# Patient Record
Sex: FEMALE | Race: WHITE | NOT HISPANIC OR LATINO | Employment: FULL TIME | URBAN - METROPOLITAN AREA
[De-identification: names, ages, dates, MRNs, and addresses within clinical notes are randomized per-mention and may not be internally consistent; named-entity substitution may affect disease eponyms.]

---

## 2017-01-11 ENCOUNTER — OFFICE VISIT (OUTPATIENT)
Dept: FAMILY MEDICINE | Facility: CLINIC | Age: 57
End: 2017-01-11
Attending: FAMILY MEDICINE
Payer: COMMERCIAL

## 2017-01-11 VITALS
SYSTOLIC BLOOD PRESSURE: 105 MMHG | DIASTOLIC BLOOD PRESSURE: 70 MMHG | HEART RATE: 93 BPM | WEIGHT: 168.4 LBS | BODY MASS INDEX: 23.57 KG/M2 | HEIGHT: 71 IN

## 2017-01-11 DIAGNOSIS — L21.9 SEBORRHEIC DERMATITIS: Primary | ICD-10-CM

## 2017-01-11 DIAGNOSIS — M77.8 TENDINITIS OF LEFT WRIST: ICD-10-CM

## 2017-01-11 PROCEDURE — 99212 OFFICE O/P EST SF 10 MIN: CPT | Mod: ZF

## 2017-01-11 RX ORDER — FLUOCINONIDE TOPICAL SOLUTION USP, 0.05% 0.5 MG/ML
SOLUTION TOPICAL
Qty: 60 ML | Refills: 0 | Status: SHIPPED
Start: 2017-01-11 | End: 2017-03-20

## 2017-01-11 ASSESSMENT — PAIN SCALES - GENERAL: PAINLEVEL: NO PAIN (0)

## 2017-01-11 NOTE — MR AVS SNAPSHOT
After Visit Summary   1/11/2017    Casie Harris    MRN: [id number]           Patient Information     Date Of Birth          1960        Visit Information        Provider Department      1/11/2017 10:00 AM Kings Millan MD [facility]        Today's Diagnoses     Seborrheic dermatitis    -  1     Tendinitis of left wrist            Follow-ups after your visit        Additional Services     [facility] PT, HAND, AND CHIROPRACTIC REFERRAL       **This order will print in the [facility] Scheduling Office**    Physical Therapy, Hand Therapy and Chiropractic Care are available through:    *[facility]  *[facility]  *[facility]    Call one number to schedule at any of the above locations: [phone].    Your provider has referred you to: Hand Therapy    Indication/Reason for Referral: Wrist Pain L  Onset of Illness: fall 2016  Therapy Orders: Evaluate and Treat  Special Programs: None  Special Request: Equipment: As Indicated: .  None    Lino Huynh      Additional Comments for the Therapist or Chiropractor: Pt active in cross country ski, bike and yoga. Would like to be able to do these activity    Please be aware that coverage of these services is subject to the terms and limitations of your health insurance plan.  Call member services at your health plan with any benefit or coverage questions.      Please bring the following to your appointment:    *Your personal calendar for scheduling future appointments  *Comfortable clothing                  Your next 10 appointments already scheduled     Mar 20, 2017  1:15 PM   MA SCREENING DIGITAL BILATERAL with [id number]   [facility] ([facility])    [address] [place] [address], [address]  [facility] [address] [phone]           Do not use any powder, lotion or deodorant under your arms or on your breast. If you do, we will ask you to remove it before your  "exam.  Wear comfortable, two-piece clothing.  If you have any allergies, tell your care team.  Bring any previous mammograms from other facilities or have them mailed to the breast center.            Mar 20, 2017  1:30 PM   Annual Visit with Sherice Jamil MD PHD   [facility] ([facility])    [address]  [address]  [address]  [facility] [address]  [facility] [address]   [phone]              Who to contact     Please call your clinic at [phone] to:    Ask questions about your health    Make or cancel appointments    Discuss your medicines    Learn about your test results    Speak to your doctor   If you have compliments or concerns about an experience at your clinic, or if you wish to file a complaint, please contact [facility] Patient Relations at [phone] or email us at [email or web address]         Additional Information About Your Visit        [employer] Information     [employer] gives you secure access to your electronic health record. If you see a primary care provider, you can also send messages to your care team and make appointments. If you have questions, please call your primary care clinic.  If you do not have a primary care provider, please call [phone] and they will assist you.      FanSnap is an electronic gateway that provides easy, online access to your medical records. With Fan[employer], you can request a clinic appointment, read your test results, renew a prescription or communicate with your care team.     To access your existing account, please contact your [facility] or call [phone] for assistance.        Care EveryWhere ID     This is your Care EveryWhere ID. This could be used by other organizations to access your [facility] medical records  [id number]        Your Vitals Were     Pulse Height BMI (Body Mass Index) Last Period          93 1.803 m (5' 11\") 23.50 kg/m2 " 03/25/2012         Blood Pressure from Last 3 Encounters:   01/11/17 105/70   11/11/16 117/79   03/07/16 110/72    Weight from Last 3 Encounters:   01/11/17 76.386 kg (168 lb 6.4 oz)   11/11/16 73.936 kg (163 lb)   03/07/16 76.023 kg (167 lb 9.6 oz)              We Performed the Following     [facility] PT, HAND, AND CHIROPRACTIC REFERRAL          Today's Medication Changes          These changes are accurate as of: 1/11/17 10:27 AM.  If you have any questions, ask your nurse or doctor.               These medicines have changed or have updated prescriptions.        Dose/Directions    * fluocinonide 0.05 % solution   Commonly known as:  LIDEX   This may have changed:  Another medication with the same name was added. Make sure you understand how and when to take each.   Used for:  Dermatitis, seborrheic   Changed by:  Barbara Manzanares MD        Apply topically 2 times daily To itchy and scaly area of scalp as needed until clear.   Quantity:  60 mL   Refills:  5       * fluocinonide 0.05 % solution   Commonly known as:  LIDEX   This may have changed:  You were already taking a medication with the same name, and this prescription was added. Make sure you understand how and when to take each.   Used for:  Seborrheic dermatitis   Changed by:  Kings Millan MD        Apply sparingly to affected area twice daily to scalp for 2 weeks   Quantity:  60 mL   Refills:  0       * Notice:  This list has 2 medication(s) that are the same as other medications prescribed for you. Read the directions carefully, and ask your doctor or other care provider to review them with you.         Where to get your medicines      These medications were sent to [id number] - [address] [address]     Phone:  [phone]    - fluocinonide 0.05 % solution             Primary Care Provider Office Phone # Fax #    Sherice Jamil MD [phone]  [phone]        [facility] [address] [facility] [address]  [facility] [address]        Thank you!     Thank you for choosing [facility]  for your care. Our goal is always to provide you with excellent care. Hearing back from our patients is one way we can continue to improve our services. Please take a few minutes to complete the written survey that you may receive in the mail after your visit with us. Thank you!             Your Updated Medication List - Protect others around you: Learn how to safely use, store and throw away your medicines at www.disposemymeds.org.          This list is accurate as of: 1/11/17 10:27 AM.  Always use your most recent med list.                   Brand Name Dispense Instructions for use    ADVIL 200 MG capsule   Generic drug:  ibuprofen      Take 200 mg by mouth every 4 hours as needed.       Calcium-Magnesium 250-125 MG Tabs      (Chewables) take 4 daily       clobetasol 0.05 % ointment    TEMOVATE    60 g    Apply topically 2 times daily To rash on feet  Along with ketoconazole until clear. May occlude with socks over medications at bedtime to help penetrate.       clonazePAM 0.5 MG tablet    klonoPIN    30 tablet    Take 1 tablet by mouth 2 times daily as needed.       FISH OIL PO      Take 2 daily       * fluocinonide 0.05 % solution    LIDEX    60 mL    Apply topically 2 times daily To itchy and scaly area of scalp as needed until clear.       * fluocinonide 0.05 % solution    LIDEX    60 mL    Apply sparingly to affected area twice daily to scalp for 2 weeks       ketoconazole 2 % shampoo    NIZORAL    120 mL    To entire wet scalp and then wash off after 5 minutes two times a week.       vitamin D 2000 UNITS Caps      Take 2 daily       * Notice:  This list has 2 medication(s) that are the same as other medications prescribed for you. Read the directions carefully, and ask your doctor or other care provider to review them with you.

## 2017-01-11 NOTE — Clinical Note
"1/11/2017       RE: Casie Harris  [address] [place] [address]     Dear Colleague,    Thank you for referring your patient, Casie Harris, to the [facility] at [facility]. Please see a copy of my visit note below.    HPI  1. L wrist pain--started late summer/early fall 2016 when biking a lot, with wrist pronated, doing repeated  Flexion/extension.  Pain is located around base of thumb, mainly at extension of wrist, with yoga postions, shoveling with wrist in extension. Sense of catching, and sharp pain and then goes away quickly. Over time, has developed tingling and numbness, tingling on thumb and thumb side mainly of hand, and then L arm feels overall weaker, but not numb. No neck pain or problems, symptoms are from wrist up. Weakness in arm constant for 2 weeks.   She has been using ice and heat, she has been exercising more; yoga and stretches. She is hoping to go skiing (cross country)    2. Rash scalp --occipital at neck line,  itching, flaking, not helped by nizoral shamppoo. This flare going on x 1-2 months, has had in past. She was seen by derm for this in 9/2015; note from that visit:  1. \"Seborrheic dermatitis: waxing/waning nature of this condition is discussed with patient and includes scale and redness of the scalp, eyebrows, ears and remainder of face. Will initiate treatment with ketoconazole shampoo and lidex solution\".  She is out of lidex currently.     ROS   No new neurological symtoms, continues periodic sense of L sided weakness, with negative neurologic evaluation in past  No new 12 point  ROS compared to prior visits.       Physical Exam   Blood pressure 105/70, pulse 93, height 1.803 m (5' 11\"), weight 76.386 kg (168 lb 6.4 oz), last menstrual period 03/25/2012, not currently breastfeeding.    L wrist: ROM non tender except maybe radial side wrist diffusely strength normal in arms bilaterally.  Scalp: " occiput at necklin: Red patch with flakes and lichenification     A/P  1. R wrist pain, possible tendonitis   Recommend refer to PT given patient's athletic interests. Recommend relative rest of wrist from yoga, other inciting activities.  2. Seborrheic dermatitis: Lidex solution bid x 2 week, continue nizoral 3x/wk ongoing  Education given regarding the chronic nature of seborrheic dermatitis.     F/u prn         Again, thank you for allowing me to participate in the care of your patient.      Sincerely,    Kings Millan MD

## 2017-01-11 NOTE — NURSING NOTE
"Chief Complaint   Patient presents with     Musculoskeletal Problem     Left wrist pain. Pt c/o \"nerve pain\" radiating from wrist up left arm.   "

## 2017-01-11 NOTE — PROGRESS NOTES
"HPI  1. L wrist pain--started late summer/early fall 2016 when biking a lot, with wrist pronated, doing repeated  Flexion/extension.  Pain is located around base of thumb, mainly at extension of wrist, with yoga postions, shoveling with wrist in extension. Sense of catching, and sharp pain and then goes away quickly. Over time, has developed tingling and numbness, tingling on thumb and thumb side mainly of hand, and then L arm feels overall weaker, but not numb. No neck pain or problems, symptoms are from wrist up. Weakness in arm constant for 2 weeks.   She has been using ice and heat, she has been exercising more; yoga and stretches. She is hoping to go skiing (cross country)    2. Rash scalp --occipital at neck line,  itching, flaking, not helped by nizoral shamppoo. This flare going on x 1-2 months, has had in past. She was seen by derm for this in 9/2015; note from that visit:  1. \"Seborrheic dermatitis: waxing/waning nature of this condition is discussed with patient and includes scale and redness of the scalp, eyebrows, ears and remainder of face. Will initiate treatment with ketoconazole shampoo and lidex solution\".  She is out of lidex currently.     ROS   No new neurological symtoms, continues periodic sense of L sided weakness, with negative neurologic evaluation in past  No new 12 point  ROS compared to prior visits.       Physical Exam   Blood pressure 105/70, pulse 93, height 1.803 m (5' 11\"), weight 76.386 kg (168 lb 6.4 oz), last menstrual period 03/25/2012, not currently breastfeeding.    L wrist: ROM non tender except maybe radial side wrist diffusely strength normal in arms bilaterally.  Scalp: occiput at necklin: Red patch with flakes and lichenification     A/P  1. R wrist pain, possible tendonitis   Recommend refer to PT given patient's athletic interests. Recommend relative rest of wrist from yoga, other inciting activities.  2. Seborrheic dermatitis: Lidex solution bid x 2 week, continue " nizoral 3x/wk ongoing  Education given regarding the chronic nature of seborrheic dermatitis.     F/u prn

## 2017-01-16 ENCOUNTER — THERAPY VISIT (OUTPATIENT)
Dept: OCCUPATIONAL THERAPY | Facility: CLINIC | Age: 57
End: 2017-01-16
Payer: COMMERCIAL

## 2017-01-16 DIAGNOSIS — M25.532 LEFT WRIST PAIN: Primary | ICD-10-CM

## 2017-01-16 PROCEDURE — 97140 MANUAL THERAPY 1/> REGIONS: CPT | Mod: GO | Performed by: OCCUPATIONAL THERAPIST

## 2017-01-16 PROCEDURE — 97165 OT EVAL LOW COMPLEX 30 MIN: CPT | Mod: GO | Performed by: OCCUPATIONAL THERAPIST

## 2017-01-16 PROCEDURE — 97112 NEUROMUSCULAR REEDUCATION: CPT | Mod: GO | Performed by: OCCUPATIONAL THERAPIST

## 2017-01-16 PROCEDURE — 97110 THERAPEUTIC EXERCISES: CPT | Mod: GO | Performed by: OCCUPATIONAL THERAPIST

## 2017-01-16 NOTE — PROGRESS NOTES
Hand Therapy Initial Evaluation    Current Date:  1/16/2017    Diagnosis: L wrist pain  DOI:  August '16  Procedure:  none    Precautions: NA    Subjective:  Casie Harris is a 56 year old right hand dominant female.    Patient reports symptoms of pain, weakness/loss of strength, numbness and tingling  of the left wrist which occurred due to not sure, but seemed to start noticing with bike riding last summer. Since onset symptoms are Unchanged  Special tests:  none.  Previous treatment: none.    General health as reported by patient is good.  Pertinent medical history includes:menopausal, numbness/tingling, pain at rest/night  Medical allergies:none.  Surgical history: none.  Medication history: steroids-local/rash.    Current occupation is writer  Currently working in normal job without restrictions  Job Tasks: prolonged sitting, repetitive tasks, computer work  Barriers include:none  Prior functional level:  no limitations    Additional Occupational Profile Information (patterns of daily living, interests, values and needs): cross country skiing, biking    Functional Outcome Measure:  Upper Extremity Functional Index Score:  SCORE:   Column Totals: /80: 72   (A lower score indicates greater disability.)    Objective:  Pain Level Report  VAS(0-10) 1/16/2017   At Rest: 0/10   With Use: 2/10     Report of Pain:  Location:  Wrist; ulnar and radial  Pain Quality:  Sharp  Frequency: intermittent    Pain is worst:  daytime  Exacerbated by:  Movement, use, biking, shoveling  Relieved by:  rest  Progression:  No change  Numbness/Tingling Level Report  VAS(0-10) 1/16/2017   At Rest: 4/10   At Worst 6/10     Report of Numbness/Tingling:  Location:  Dorsal hand  Pain Quality:  Numb and Tiring  Frequency: constant since December    Edema:  NONE  Sensation: Decreased Median Nerve distribution and Decreased Radial Nerve distribution    Pain Report:  - none    + mild    ++ moderate    +++ severe   Wrist  1/16/2017   AROM  (PROM) right left   Extension 74 80   Flexion 66 64   RD 20 22   UD 32 29+   Supination 90 88   Pronation 86 85+     Tenderness: Pain level report on scale 0-10/10  Date 1/16/2017   Side    Ulna Styloid -   TFCC -   Distal Radius -   Radial Styloid -   DRUJ -   Volar Scaphoid -   Dorsal Scaphoid -   Volar Lunate -   Dorsal Lunate -     Date 1/16/2017   Side    Radiocarpal P/S (TFCC--stab f/a, rotate with some compression) -   Ballottement II P/S (TFCC--stab hand/wrist, pt p/s) +   TFCC load Test (UD, axially load wrist c volar/dorsal mvmt) -   UMTDG test (TFCC--approximate the pisotriquetral and ulna) -   Lunotriquetral Sheer Test -   Piano Key Sign (DRUJ)      Resistance 1/16/2017   FCU +   Provocative Tests    Bent Elbow Test -   Phalen's -   Tinel's at Cubital tunnel -   Tinel's at Pronator -   Tinel's at carpal tunnel -   Tinel's at [place]on[place] canal -       Strength:   (Measured in pounds)  Pain Report:  - none    + mild    ++ moderate    +++ severe     1/16/2017   Trials right left   1  2  3 67  64  72 40  46  47   Average: 68 44     Lat Pinch  1/16/2017   Trials right left   1  2  3 17  18  18 16  16  16   Average: 18 16     3 Pt Pinch  1/16/2017   Trials right left   1  2  3 16  15  15 15  16  16   Average: 15 16     Assessment:  Patient presents with symptoms consistent with diagnosis of left wrist pain and parasthesias,  with conservative intervention.     Patient's limitations or Problem List includes:  Pain, Decreased ROM/motion, Weakness and Sensory disturbance of the left wrist, hand and forearm which interferes with the patient's ability to perform Self Care Tasks (dressing, eating, bathing, hygiene/toileting), Work Tasks, Sleep Patterns, Recreational Activities, Household Chores and Driving  as compared to previous level of function.    Rehab Potential:  Good - Return to full activity, some limitations    Patient will benefit from skilled Occupational Therapy to increase ROM, overall strength and  sensation and decrease pain to return to previous activity level and resume normal daily tasks and to reach their rehab potential.    Barriers to Learning:  No barrier    Communication Issues:  Patient appears to be able to clearly communicate and understand verbal and written communication and follow directions correctly.    Assessment of Occupational Performance:  5 or more Performance Deficits  Identified Performance Deficits: household chores , sports/recreation, pushing, pulling, lifting, carrying, sleeping  and home management      Clinical Decision Making (Complexity): Moderate complexity    Treatment Explanation:  The following has been discussed with the patient:  RX ordered/plan of care  Anticipated outcomes  Possible risks and side effects    Plan:  Frequency:  1 X week, once daily  Duration:  for 6 weeks    Treatment Plan:   Modalities:  US, Fluidotherapy, Paraffin and Laser Light  Therapeutic Exercise:  AROM, AAROM, PROM, Tendon Gliding and Blocking  Neuromuscular re-education:  Nerve Gliding and Kinesiotaping  Manual Techniques:  Myofascial release  Self Care:  Self Care Tasks  Discharge Plan:  Achieve all LTG.  Independent in home treatment program.  Reach maximal therapeutic benefit.    Home Exercise Program:  T-towel exercise  Massage  Sitting on exercise ball  Purchase OTC wrist splint for sleeping  Median Nerve glide    Next Visit:  A/AA/PROM  MFR  Nerve gliding

## 2017-01-30 ENCOUNTER — THERAPY VISIT (OUTPATIENT)
Dept: OCCUPATIONAL THERAPY | Facility: CLINIC | Age: 57
End: 2017-01-30
Payer: COMMERCIAL

## 2017-01-30 DIAGNOSIS — M25.532 LEFT WRIST PAIN: Primary | ICD-10-CM

## 2017-01-30 PROCEDURE — 97110 THERAPEUTIC EXERCISES: CPT | Mod: GO | Performed by: OCCUPATIONAL THERAPIST

## 2017-01-30 PROCEDURE — 29125 APPL SHORT ARM SPLINT STATIC: CPT | Mod: GO | Performed by: OCCUPATIONAL THERAPIST

## 2017-01-30 PROCEDURE — 97140 MANUAL THERAPY 1/> REGIONS: CPT | Mod: GO | Performed by: OCCUPATIONAL THERAPIST

## 2017-01-30 NOTE — PROGRESS NOTES
Please refer to the daily flowsheet for treatment today, total treatment time and time spent performing 1:1 timed codes.     Pain Report:  - none    + mild    ++ moderate    +++ severe   Wrist  1/16/2017 1/30/17   AROM (PROM) right left    Extension 74 80 81   Flexion 66 64 70   RD 20 22 22   UD 32 29+ 32   Supination 90 88 88   Pronation 86 85+ 85   Please refer to the daily flowsheet for treatment today, total treatment time and time spent performing 1:1 timed codes.       Home Exercise Program:  T-towel exercise  Massage  Sitting on exercise ball  Wrist cock-up orthosis  Median Nerve glide    Next Visit:  A/AA/PROM  MFR  Nerve gliding

## 2017-03-06 ASSESSMENT — ENCOUNTER SYMPTOMS
WEIGHT GAIN: 0
WEIGHT LOSS: 0
ALTERED TEMPERATURE REGULATION: 0
FATIGUE: 0
NECK MASS: 0
ARTHRALGIAS: 0
NECK PAIN: 1
CHILLS: 0
HOT FLASHES: 1
MUSCLE WEAKNESS: 0
NIGHT SWEATS: 1
DECREASED LIBIDO: 0
SINUS PAIN: 0
TASTE DISTURBANCE: 0
STIFFNESS: 0
DOUBLE VISION: 0
SORE THROAT: 0
TROUBLE SWALLOWING: 0
MYALGIAS: 1
HALLUCINATIONS: 0
POLYDIPSIA: 0
EYE IRRITATION: 1
EYE REDNESS: 0
DECREASED APPETITE: 0
MUSCLE CRAMPS: 0
JOINT SWELLING: 0
EYE PAIN: 0
HOARSE VOICE: 0
BACK PAIN: 1
INCREASED ENERGY: 0
SINUS CONGESTION: 0
SMELL DISTURBANCE: 0
FEVER: 0
EYE WATERING: 0
POLYPHAGIA: 0

## 2017-03-06 ASSESSMENT — ANXIETY QUESTIONNAIRES
7. FEELING AFRAID AS IF SOMETHING AWFUL MIGHT HAPPEN: 0 = NOT AT ALL
GAD7 TOTAL SCORE: 2
GAD7 TOTAL SCORE: 2

## 2017-03-13 PROBLEM — M25.532 LEFT WRIST PAIN: Status: RESOLVED | Noted: 2017-01-16 | Resolved: 2017-03-13

## 2017-03-20 ENCOUNTER — RADIANT APPOINTMENT (OUTPATIENT)
Dept: MAMMOGRAPHY | Facility: CLINIC | Age: 57
End: 2017-03-20
Attending: FAMILY MEDICINE
Payer: COMMERCIAL

## 2017-03-20 ENCOUNTER — OFFICE VISIT (OUTPATIENT)
Dept: FAMILY MEDICINE | Facility: CLINIC | Age: 57
End: 2017-03-20
Attending: FAMILY MEDICINE
Payer: COMMERCIAL

## 2017-03-20 VITALS
DIASTOLIC BLOOD PRESSURE: 72 MMHG | SYSTOLIC BLOOD PRESSURE: 105 MMHG | BODY MASS INDEX: 23.15 KG/M2 | HEART RATE: 76 BPM | WEIGHT: 165.4 LBS | HEIGHT: 71 IN

## 2017-03-20 DIAGNOSIS — Z00.00 ROUTINE GENERAL MEDICAL EXAMINATION AT A HEALTH CARE FACILITY: Primary | ICD-10-CM

## 2017-03-20 DIAGNOSIS — Z12.31 VISIT FOR SCREENING MAMMOGRAM: ICD-10-CM

## 2017-03-20 DIAGNOSIS — L90.0 LICHEN SCLEROSUS: ICD-10-CM

## 2017-03-20 DIAGNOSIS — N95.1 VASOMOTOR SYMPTOMS DUE TO MENOPAUSE: ICD-10-CM

## 2017-03-20 DIAGNOSIS — D12.6 BENIGN NEOPLASM OF COLON, UNSPECIFIED PART OF COLON: ICD-10-CM

## 2017-03-20 DIAGNOSIS — F41.1 GENERALIZED ANXIETY DISORDER: ICD-10-CM

## 2017-03-20 PROCEDURE — 90472 IMMUNIZATION ADMIN EACH ADD: CPT | Mod: ZF

## 2017-03-20 PROCEDURE — 25000128 H RX IP 250 OP 636: Mod: ZF

## 2017-03-20 PROCEDURE — G0202 SCR MAMMO BI INCL CAD: HCPCS

## 2017-03-20 PROCEDURE — 25000125 ZZHC RX 250: Mod: ZF

## 2017-03-20 PROCEDURE — 99213 OFFICE O/P EST LOW 20 MIN: CPT | Mod: 25

## 2017-03-20 PROCEDURE — 90715 TDAP VACCINE 7 YRS/> IM: CPT | Mod: ZF

## 2017-03-20 PROCEDURE — 90471 IMMUNIZATION ADMIN: CPT | Mod: ZF

## 2017-03-20 PROCEDURE — 90746 HEPB VACCINE 3 DOSE ADULT IM: CPT | Mod: ZF

## 2017-03-20 RX ORDER — ESTRADIOL 0.04 MG/D
1 PATCH, EXTENDED RELEASE TRANSDERMAL
Qty: 8 PATCH | Refills: 3 | Status: SHIPPED | OUTPATIENT
Start: 2017-03-20 | End: 2017-08-03

## 2017-03-20 RX ORDER — ESTRADIOL 10 UG/1
INSERT VAGINAL
COMMUNITY
Start: 2017-03-16 | End: 2017-03-20

## 2017-03-20 ASSESSMENT — PATIENT HEALTH QUESTIONNAIRE - PHQ9: 5. POOR APPETITE OR OVEREATING: NOT AT ALL

## 2017-03-20 ASSESSMENT — ANXIETY QUESTIONNAIRES
7. FEELING AFRAID AS IF SOMETHING AWFUL MIGHT HAPPEN: NOT AT ALL
3. WORRYING TOO MUCH ABOUT DIFFERENT THINGS: SEVERAL DAYS
5. BEING SO RESTLESS THAT IT IS HARD TO SIT STILL: NOT AT ALL
1. FEELING NERVOUS, ANXIOUS, OR ON EDGE: SEVERAL DAYS
GAD7 TOTAL SCORE: 3
2. NOT BEING ABLE TO STOP OR CONTROL WORRYING: SEVERAL DAYS
6. BECOMING EASILY ANNOYED OR IRRITABLE: NOT AT ALL

## 2017-03-20 ASSESSMENT — PAIN SCALES - GENERAL: PAINLEVEL: NO PAIN (0)

## 2017-03-20 NOTE — PATIENT INSTRUCTIONS
Casie was seen today for annual visit.      Please plan on returning to clinic in 4 months for follow-up.    Diagnoses and all orders for this visit:    Routine general medical examination at a health care facility  Today we will administer a TDAP and Hepatitis B Vaccine.  We have also put in an order for you to have your lipids (cholesterol) checked in the future.  Please have lipids checked after fasting.  -     TDAP ( BOOSTRIX AGES 10-64)  -     HEPATITIS B VACCINE,ADULT,IM  #1 today and #2 in 1 month and #3 in 6 months from now.   -     Lipid Panel; Future    Benign neoplasm of colon, unspecified part of colon  We have put in an order for a colonoscopy.  The [facility] should call you to make an appointment.  If you do not hear from them, please call the number listed in your visit instructions.  -     GASTROENTEROLOGY ADULT REF PROCEDURE ONLY; Future    Vasomotor symptoms due to menopause  Will plan to start hormone replacement therapy.  Use the Vivelle-Dot patch (apply twice weekly) and use the prometrium on a daily basis.  Please stop taking the vaginal pill that you are currently taking.  -     estradiol (VIVELLE-DOT) 0.0375 MG/24HR BIW patch; Place 1 patch onto the skin twice a week  -     progesterone (PROMETRIUM) 100 MG capsule; Take 1 capsule (100 mg) by mouth daily    Lichen sclerosus  Continue taking the topical steroid as prescribed.    Generalized anxiety disorder

## 2017-03-20 NOTE — MR AVS SNAPSHOT
After Visit Summary   3/20/2017    Casie Harris    MRN: [id number]           Patient Information     Date Of Birth          1960        Visit Information        Provider Department      3/20/2017 1:30 PM Sherice Jamil MD PhD [facility]        Today's Diagnoses     Routine general medical examination at a health care facility    -  1    Benign neoplasm of colon, unspecified part of colon        Vasomotor symptoms due to menopause        Lichen sclerosus        Generalized anxiety disorder          Care Instructions    Casie was seen today for annual visit.      Please plan on returning to clinic in 4 months for follow-up.    Diagnoses and all orders for this visit:    Routine general medical examination at a health care facility  Today we will administer a TDAP and Hepatitis B Vaccine.  We have also put in an order for you to have your lipids (cholesterol) checked in the future.  Please have lipids checked after fasting.  -     TDAP ( BOOSTRIX AGES 10-64)  -     HEPATITIS B VACCINE,ADULT,IM  -     Lipid Panel; Future    Benign neoplasm of colon, unspecified part of colon  We have put in an order for a colonoscopy.  [facility] should call you to make an appointment.  If you do not hear from them, please call the number listed in your visit instructions.  -     GASTROENTEROLOGY ADULT REF PROCEDURE ONLY; Future    Vasomotor symptoms due to menopause  Will plan to start hormone replacement therapy.  Use the Vivelle-Dot patch (apply twice weekly) and use the prometrium on a daily basis.  Please stop taking the vaginal pill that you are currently taking.  -     estradiol (VIVELLE-DOT) 0.0375 MG/24HR BIW patch; Place 1 patch onto the skin twice a week  -     progesterone (PROMETRIUM) 100 MG capsule; Take 1 capsule (100 mg) by mouth daily    Lichen sclerosus  Continue taking the topical steroid as prescribed.    Generalized anxiety disorder                Follow-ups  after your visit        Additional Services     GASTROENTEROLOGY ADULT REF CONSULT ONLY       Preferred Location: [facility], [facility] [phone]      Please be aware that coverage of these services is subject to the terms and limitations of your health insurance plan.  Call member services at your health plan with any benefit or coverage questions.  Any procedures must be performed at a [facility] facility OR coordinated by your clinic's referral office.    Please bring the following with you to your appointment:    (1) Any X-Rays, CTs or MRIs which have been performed.  Contact the facility where they were done to arrange for  prior to your scheduled appointment.    (2) List of current medications   (3) This referral request   (4) Any documents/labs given to you for this referral            GASTROENTEROLOGY ADULT REF PROCEDURE ONLY       Last Lab Result: Creatinine (mg/dL)       Date                     Value                 03/07/2016               0.80             ----------  Body mass index is 23.07 kg/(m^2).     Needed:  No  Language:  English    Patient will be contacted to schedule procedure.     Please be aware that coverage of these services is subject to the terms and limitations of your health insurance plan.  Call member services at your health plan with any benefit or coverage questions.  Any procedures must be performed at a [facility] facility OR coordinated by your clinic's referral office.    Please bring the following with you to your appointment:    (1) Any X-Rays, CTs or MRIs which have been performed.  Contact the facility where they were done to arrange for  prior to your scheduled appointment.    (2) List of current medications   (3) This referral request   (4) Any documents/labs given to you for this referral                  Follow-up notes from your care team     Return in about 4 months (around 7/20/2017).      Future tests that were ordered for you today     Open  "Future Orders        Priority Expected Expires Ordered    GASTROENTEROLOGY ADULT REF CONSULT ONLY Routine 3/21/2017 12/20/2017 3/20/2017    GASTROENTEROLOGY ADULT REF PROCEDURE ONLY Routine 3/21/2017 12/20/2017 3/20/2017    Lipid Panel Routine 3/21/2017 3/20/2018 3/20/2017            Who to contact     Please call your clinic at [phone] to:    Ask questions about your health    Make or cancel appointments    Discuss your medicines    Learn about your test results    Speak to your doctor   If you have compliments or concerns about an experience at your clinic, or if you wish to file a complaint, please contact [facility] Patient Relations at [phone] or email us at [email or web address]         Additional Information About Your Visit        [employer] Information     [employer] gives you secure access to your electronic health record. If you see a primary care provider, you can also send messages to your care team and make appointments. If you have questions, please call your primary care clinic.  If you do not have a primary care provider, please call [phone] and they will assist you.      [employer] is an electronic gateway that provides easy, online access to your medical records. With [employer], you can request a clinic appointment, read your test results, renew a prescription or communicate with your care team.     To access your existing account, please contact your [facility] or call [phone] for assistance.        Care EveryWhere ID     This is your Care EveryWhere ID. This could be used by other organizations to access your [facility] medical records  [id number]        Your Vitals Were     Pulse Height Last Period BMI (Body Mass Index)          76 1.803 m (5' 11\") 03/25/2012 23.07 kg/m2         Blood Pressure from Last 3 Encounters:   03/20/17 105/72   01/11/17 105/70   11/11/16 117/79    Weight from Last 3 Encounters:   03/20/17 75 " kg (165 lb 6.4 oz)   01/11/17 76.4 kg (168 lb 6.4 oz)   11/11/16 73.9 kg (163 lb)              We Performed the Following     HEPATITIS B VACCINE,ADULT,IM     TDAP ( BOOSTRIX AGES 10-64)          Today's Medication Changes          These changes are accurate as of: 3/20/17  2:40 PM.  If you have any questions, ask your nurse or doctor.               Start taking these medicines.        Dose/Directions    estradiol 0.0375 MG/24HR BIW patch   Commonly known as:  VIVELLE-DOT   Used for:  Vasomotor symptoms due to menopause   Started by:  Sherice Jamil MD PhD        Dose:  1 patch   Place 1 patch onto the skin twice a week   Quantity:  8 patch   Refills:  3       progesterone 100 MG capsule   Commonly known as:  PROMETRIUM   Used for:  Vasomotor symptoms due to menopause   Started by:  Sherice Jamil MD PhD        Dose:  100 mg   Take 1 capsule (100 mg) by mouth daily   Quantity:  90 capsule   Refills:  3            Where to get your medicines      These medications were sent to [facility] [id number] [place] [address] AT [address] [address]     Phone:  [phone]     estradiol 0.0375 MG/24HR BIW patch    progesterone 100 MG capsule                Primary Care Provider Office Phone # Fax #    Sherice Jamil MD PhD [phone]        [facility] [address] [facility] [address]  [facility] [address]        Thank you!     Thank you for choosing [facility]  for your care. Our goal is always to provide you with excellent care. Hearing back from our patients is one way we can continue to improve our services. Please take a few minutes to complete the written survey that you may receive in the mail after your visit with us. Thank you!             Your Updated Medication List - Protect others around you: Learn how to safely use, store and throw away your medicines at www.disposemymeds.org.          This list is accurate as  of: 3/20/17  2:40 PM.  Always use your most recent med list.                   Brand Name Dispense Instructions for use    Calcium-Magnesium 250-125 MG Tabs      (Chewables) take 4 daily       clobetasol 0.05 % ointment    TEMOVATE    60 g    Apply topically 2 times daily To rash on feet  Along with ketoconazole until clear. May occlude with socks over medications at bedtime to help penetrate.       clonazePAM 0.5 MG tablet    klonoPIN    30 tablet    Take 1 tablet by mouth 2 times daily as needed.       estradiol 0.0375 MG/24HR BIW patch    VIVELLE-DOT    8 patch    Place 1 patch onto the skin twice a week       FISH OIL PO      Take 2 daily       PROBIOTIC DAILY PO          progesterone 100 MG capsule    PROMETRIUM    90 capsule    Take 1 capsule (100 mg) by mouth daily       vitamin D 2000 UNITS Caps      Take 2 daily

## 2017-03-20 NOTE — LETTER
"3/20/2017       RE: Casie Harris  [address] [place] [address]     Dear Colleague,    Thank you for referring your patient, Casie Harris, to the [facility] at [facility]. Please see a copy of my visit note below.    REASON for VISIT: annual    HPI   Casie Harris is a 56 year old female who is here for a preventive examination. She is Gr0P0 PM no vaginal bleeding.     Patient has concerns about needing a repeat colonoscopy.  Patient had 4 polyps removed 3/2014.  Recommendation was to have a repeat colonoscopy in 3-6 years.    Patient also had a mammogram performed (today ).  Results are still pending.    Last pap 12/2015 at [facility]- NIL (test ordered as \"HPV if ASCUS\"). Had recent pelvic - 12/2016.      Patient would also like to know more about taking hormone replacement.  She started HRT in 2012 (estrogen patch and progesterone pill) and stopped around 2014.  Patient started taking estrogen tablet for vaginal dryness approximately one year ago. She is getting hot flashes and wants to consider going back on HT.      Patient has also started taking a topical steroid for lichen sclerosis (in vulvar area).  She has tried tapering the steroid, but when she does it flares up again.    Patient has also developed giant papillary conjunctivitis.    Patient has had hep C screening and was negative.        Past Medical History   Diagnosis Date     Arthritis      Colon polyps 2014     Generalised anxiety disorder      Lichen sclerosus          Current Outpatient Prescriptions   Medication Sig Dispense Refill     Probiotic Product (PROBIOTIC DAILY PO)        estradiol (VIVELLE-DOT) 0.0375 MG/24HR BIW patch Place 1 patch onto the skin twice a week 8 patch 3     progesterone (PROMETRIUM) 100 MG capsule Take 1 capsule (100 mg) by mouth daily 90 capsule 3     clobetasol (TEMOVATE) 0.05 % ointment Apply topically 2 times daily To rash on feet "  Along with ketoconazole until clear. May occlude with socks over medications at bedtime to help penetrate. 60 g 5     Calcium-Magnesium 250-125 MG TABS (Chewables) take 4 daily       Cholecalciferol (VITAMIN D) 2000 UNIT CAPS Take 2 daily       Omega-3 Fatty Acids (FISH OIL PO) Take 2 daily       clonazePAM (KLONOPIN) 0.5 MG tablet Take 1 tablet by mouth 2 times daily as needed. (Patient not taking: Reported on 3/20/2017) 30 tablet 3        Allergies   Allergen Reactions     Nkda [No Known Drug Allergies]      Most Recent Immunizations   Administered Date(s) Administered     Tdap (Adacel,Boostrix) 05/08/2007   Pended Date(s) Pended     Hepatitis B 03/20/2017     TDAP (BOOSTRIX AGES 10-64) 03/20/2017         Social History   Substance Use Topics     Smoking status: Never Smoker     Smokeless tobacco: Never Used     Alcohol use 0.0 oz/week      Comment: wine with dinner     Social History     Social History     Marital status:      Spouse name: N/A     Number of children: N/A     Years of education: N/A     Occupational History            Social History Main Topics     Smoking status: Never Smoker     Smokeless tobacco: Never Used     Alcohol use 0.0 oz/week      Comment: wine with dinner     Drug use: No     Sexual activity: Yes     Partners: Male     Birth control/ protection: Post-menopausal     Other Topics Concern     Caffeine Concern Yes     2 c/day     Sleep Concern Yes     Stress Concern Yes     sister committed suicide     Weight Concern Yes     Special Diet No     Exercise Yes     walk q day,  - 3x/wk, bike     Bike Helmet Yes     most times     Seat Belt Yes     Social History Narrative    ,  for 7 years, no children. Family in area.          Family History   Problem Relation Age of Onset     Prostate Cancer Father      HEART DISEASE Father      Cardiovascular Father      AF CHF     Neurologic Disorder Father      trigeminal neuralgia     CANCER Father      BCC      "CEREBROVASCULAR DISEASE Sister      Lipids Sister      HEART DISEASE Maternal Grandfather      HEART DISEASE Paternal Grandmother      Breast Cancer Paternal Aunt      Suicide Sister      Depression Sister      MENTAL ILLNESS Sister           Review Of Systems  General: No issues  Skin: positive for lichen sclerosis  Eyes: positive for giant papillary conjunctivitis  Ears/Nose/Throat: negative other than wax buildup in right ear  Respiratory: No shortness of breath, dyspnea on exertion, cough, or hemoptysis  Cardiovascular: negative  Gastrointestinal: negative  Genitourinary: negative  Musculoskeletal: negative  Neurologic: negative  Psychiatric: negative  BECCA=3; PHQ9=2  Hematologic/Lymphatic/Immunologic: negative except for night sweats/hot flashes  Endocrine: negative    OBJECTIVE:  /72 (BP Location: Left arm, Patient Position: Chair, Cuff Size: Adult Regular)  Pulse 76  Ht 1.803 m (5' 11\")  Wt 75 kg (165 lb 6.4 oz)  LMP 03/25/2012  BMI 23.07 kg/m2  Gen:  healthy woman in NAD  HEENT: PERRL fundi benign  Ears clear with good light reflex.  OP MMM no lesions.  No drainage  Neck  Supple.  Thyroid not palpable - does have neck fullness  No carotid bruits.  No LAD  CVS exam: S1, S2 normal, no murmur, click, rub or gallop, regular rate and rhythm, chest is clear without rales or wheezing, no pedal edema, no JVD, no hepatosplenomegaly.  Abd Soft ND NT  BS present   No masses or HSM  Ext   Good pulses. No edema  Musculoskeletal: spine is straight, extremities full ROM, no deformity  BREAST:  normal without suspicious masses, skin changes or axillary nodes, symmetric fibrous changes in both upper outer quadrants   Pelvic exam: deferred  Rectal exam: deferred  Neuro: Neurological exam reveals normal without focal findings, mental status, speech normal, alert and oriented x iii, RENETTA, fundi are normal, cranial nerves 2-12 intact, muscle tone and strength normal and symmetric, reflexes normal and " symmetric.      ASSESSMENT:PM female who comes in today with several concerns   (Z00.00) Routine general medical examination at a health care facility  (primary encounter diagnosis)  Comment: had pelvic in 12/16 by gyn , needs tetanus, also start hep B series - no indication for a DXA, mammogram today; discussed calcium and vit D and exercise   Plan: TDAP ( BOOSTRIX AGES 10-64), HEPATITIS B #1          VACCINE,ADULT,IM, Lipid Panel          (D12.6) Benign neoplasm of colon, unspecified part of colon  Comment: Hx of colon polyps - : tubular adenoma - told needed repeat in 3-6 yrs  (last time 2014)  Plan:         GASTROENTEROLOGY ADULT REF PROCEDURE ONLY            (N95.1) Vasomotor symptoms due to menopause  Comment: has been on HT in past for about 1 yr, stopped it and now still having hot flashes - would like to restart therapy  Plan: estradiol (VIVELLE-DOT) 0.0375 MG/24HR BIW         patch, progesterone (PROMETRIUM) 100 MG capsule        Reviewed risks and benefits    (L90.0) Lichen sclerosus  Comment: was dx by gyn - using steroid cream  Plan: per gyn    (F41.1) Generalized anxiety disorder  Comment: BECCA 3 - improved and not on meds  Plan: follow    Sherice Jamil MD, PhD

## 2017-03-20 NOTE — PROGRESS NOTES
"REASON for VISIT: annual    HPI   Casie Harris is a 56 year old female who is here for a preventive examination. She is Gr0P0 PM no vaginal bleeding.     Patient has concerns about needing a repeat colonoscopy.  Patient had 4 polyps removed 3/2014.  Recommendation was to have a repeat colonoscopy in 3-6 years.    Patient also had a mammogram performed (today ).  Results are still pending.    Last pap 12/2015 at [facility]- NIL (test ordered as \"HPV if ASCUS\"). Had recent pelvic - 12/2016.      Patient would also like to know more about taking hormone replacement.  She started HRT in 2012 (estrogen patch and progesterone pill) and stopped around 2014.  Patient started taking estrogen tablet for vaginal dryness approximately one year ago. She is getting hot flashes and wants to consider going back on HT.      Patient has also started taking a topical steroid for lichen sclerosis (in vulvar area).  She has tried tapering the steroid, but when she does it flares up again.    Patient has also developed giant papillary conjunctivitis.    Patient has had hep C screening and was negative.        Past Medical History   Diagnosis Date     Arthritis      Colon polyps 2014     Generalised anxiety disorder      Lichen sclerosus          Current Outpatient Prescriptions   Medication Sig Dispense Refill     Probiotic Product (PROBIOTIC DAILY PO)        estradiol (VIVELLE-DOT) 0.0375 MG/24HR BIW patch Place 1 patch onto the skin twice a week 8 patch 3     progesterone (PROMETRIUM) 100 MG capsule Take 1 capsule (100 mg) by mouth daily 90 capsule 3     clobetasol (TEMOVATE) 0.05 % ointment Apply topically 2 times daily To rash on feet  Along with ketoconazole until clear. May occlude with socks over medications at bedtime to help penetrate. 60 g 5     Calcium-Magnesium 250-125 MG TABS (Chewables) take 4 daily       Cholecalciferol (VITAMIN D) 2000 UNIT CAPS Take 2 daily       Omega-3 Fatty Acids (FISH OIL PO) Take 2 daily       " clonazePAM (KLONOPIN) 0.5 MG tablet Take 1 tablet by mouth 2 times daily as needed. (Patient not taking: Reported on 3/20/2017) 30 tablet 3        Allergies   Allergen Reactions     Nkda [No Known Drug Allergies]      Most Recent Immunizations   Administered Date(s) Administered     Tdap (Adacel,Boostrix) 05/08/2007   Pended Date(s) Pended     Hepatitis B 03/20/2017     TDAP (BOOSTRIX AGES 10-64) 03/20/2017         Social History   Substance Use Topics     Smoking status: Never Smoker     Smokeless tobacco: Never Used     Alcohol use 0.0 oz/week      Comment: wine with dinner     Social History     Social History     Marital status:      Spouse name: N/A     Number of children: N/A     Years of education: N/A     Occupational History            Social History Main Topics     Smoking status: Never Smoker     Smokeless tobacco: Never Used     Alcohol use 0.0 oz/week      Comment: wine with dinner     Drug use: No     Sexual activity: Yes     Partners: Male     Birth control/ protection: Post-menopausal     Other Topics Concern     Caffeine Concern Yes     2 c/day     Sleep Concern Yes     Stress Concern Yes     sister committed suicide     Weight Concern Yes     Special Diet No     Exercise Yes     walk q day,  - 3x/wk, bike     Bike Helmet Yes     most times     Seat Belt Yes     Social History Narrative    ,  for 7 years, no children. Family in area.          Family History   Problem Relation Age of Onset     Prostate Cancer Father      HEART DISEASE Father      Cardiovascular Father      AF CHF     Neurologic Disorder Father      trigeminal neuralgia     CANCER Father      BCC     CEREBROVASCULAR DISEASE Sister      Lipids Sister      HEART DISEASE Maternal Grandfather      HEART DISEASE Paternal Grandmother      Breast Cancer Paternal Aunt      Suicide Sister      Depression Sister      MENTAL ILLNESS Sister           Review Of Systems  General: No issues  Skin: positive  "for lichen sclerosis  Eyes: positive for giant papillary conjunctivitis  Ears/Nose/Throat: negative other than wax buildup in right ear  Respiratory: No shortness of breath, dyspnea on exertion, cough, or hemoptysis  Cardiovascular: negative  Gastrointestinal: negative  Genitourinary: negative  Musculoskeletal: negative  Neurologic: negative  Psychiatric: negative  BECCA=3; PHQ9=2  Hematologic/Lymphatic/Immunologic: negative except for night sweats/hot flashes  Endocrine: negative    OBJECTIVE:  /72 (BP Location: Left arm, Patient Position: Chair, Cuff Size: Adult Regular)  Pulse 76  Ht 1.803 m (5' 11\")  Wt 75 kg (165 lb 6.4 oz)  LMP 03/25/2012  BMI 23.07 kg/m2  Gen:  healthy woman in NAD  HEENT: PERRL fundi benign  Ears clear with good light reflex.  OP MMM no lesions.  No drainage  Neck  Supple.  Thyroid not palpable - does have neck fullness  No carotid bruits.  No LAD  CVS exam: S1, S2 normal, no murmur, click, rub or gallop, regular rate and rhythm, chest is clear without rales or wheezing, no pedal edema, no JVD, no hepatosplenomegaly.  Abd Soft ND NT  BS present   No masses or HSM  Ext   Good pulses. No edema  Musculoskeletal: spine is straight, extremities full ROM, no deformity  BREAST:  normal without suspicious masses, skin changes or axillary nodes, symmetric fibrous changes in both upper outer quadrants   Pelvic exam: deferred  Rectal exam: deferred  Neuro: Neurological exam reveals normal without focal findings, mental status, speech normal, alert and oriented x iii, RENETTA, fundi are normal, cranial nerves 2-12 intact, muscle tone and strength normal and symmetric, reflexes normal and symmetric.      ASSESSMENT:PM female who comes in today with several concerns   (Z00.00) Routine general medical examination at a health care facility  (primary encounter diagnosis)  Comment: had pelvic in 12/16 by gyn , needs tetanus, also start hep B series - no indication for a DXA, mammogram today; discussed " calcium and vit D and exercise   Plan: TDAP ( BOOSTRIX AGES 10-64), HEPATITIS B #1          VACCINE,ADULT,IM, Lipid Panel            (D12.6) Benign neoplasm of colon, unspecified part of colon  Comment: Hx of colon polyps - : tubular adenoma - told needed repeat in 3-6 yrs  (last time 2014)  Plan:         GASTROENTEROLOGY ADULT REF PROCEDURE ONLY            (N95.1) Vasomotor symptoms due to menopause  Comment: has been on HT in past for about 1 yr, stopped it and now still having hot flashes - would like to restart therapy  Plan: estradiol (VIVELLE-DOT) 0.0375 MG/24HR BIW         patch, progesterone (PROMETRIUM) 100 MG capsule        Reviewed risks and benefits    (L90.0) Lichen sclerosus  Comment: was dx by gyn - using steroid cream  Plan: per gyn    (F41.1) Generalized anxiety disorder  Comment: BECCA 3 - improved and not on meds  Plan: follow      Sherice Jamil MD, PhD

## 2017-03-21 ASSESSMENT — PATIENT HEALTH QUESTIONNAIRE - PHQ9: SUM OF ALL RESPONSES TO PHQ QUESTIONS 1-9: 2

## 2017-04-07 ENCOUNTER — OFFICE VISIT (OUTPATIENT)
Dept: OTOLARYNGOLOGY | Facility: CLINIC | Age: 57
End: 2017-04-07
Payer: COMMERCIAL

## 2017-04-07 VITALS
BODY MASS INDEX: 23.1 KG/M2 | SYSTOLIC BLOOD PRESSURE: 104 MMHG | DIASTOLIC BLOOD PRESSURE: 69 MMHG | WEIGHT: 165 LBS | HEIGHT: 71 IN | HEART RATE: 80 BPM

## 2017-04-07 DIAGNOSIS — H61.21 IMPACTED CERUMEN OF RIGHT EAR: Primary | ICD-10-CM

## 2017-04-07 PROCEDURE — 69210 REMOVE IMPACTED EAR WAX UNI: CPT | Performed by: OTOLARYNGOLOGY

## 2017-04-07 PROCEDURE — 99212 OFFICE O/P EST SF 10 MIN: CPT | Mod: 25 | Performed by: OTOLARYNGOLOGY

## 2017-04-07 ASSESSMENT — ENCOUNTER SYMPTOMS
VOMITING: 0
BLURRED VISION: 0
TINGLING: 0
DIZZINESS: 0
TREMORS: 0
DOUBLE VISION: 0
NAUSEA: 0
CONSTITUTIONAL NEGATIVE: 1
HEARTBURN: 0
BRUISES/BLEEDS EASILY: 0
COUGH: 0

## 2017-04-07 NOTE — MR AVS SNAPSHOT
After Visit Summary   4/7/2017    Casie Harris    MRN: [id number]           Patient Information     Date Of Birth          1960        Visit Information        Provider Department      4/7/2017 10:00 AM Armida Avendano MD [facility]        Today's Diagnoses     Impacted cerumen of right ear    -  1       Follow-ups after your visit        Your next 10 appointments already scheduled     Apr 24, 2017  9:00 AM CDT   Return Visit with Sherice Jamil MD PhD   [facility] ([facility])    [address]  [address]  [address]  [facility] [address]  [facility] [address]   [phone]            Jul 24, 2017  1:30 PM CDT   Return Visit with Sherice Jamil MD PhD   [facility] ([facility])    [address]  [address]  [address]  [facility] [address]  [facility] [address]   [phone]              Who to contact     If you have questions or need follow up information about today's clinic visit or your schedule please contact [facility] directly at [phone].  Normal or non-critical lab and imaging results will be communicated to you by MyChart, letter or phone within 4 business days after the clinic has received the results. If you do not hear from us within 7 days, please contact the clinic through Precision Biopsyhart or phone. If you have a critical or abnormal lab result, we will notify you by phone as soon as possible.  Submit refill requests through Tellme or call your pharmacy and they will forward the refill request to us. Please allow 3 business days for your refill to be completed.          Additional Information About Your Visit        MyChart Information     Tellme gives you secure access to your electronic health record. If you see a primary care provider, you can also send messages to your care team and make appointments. If you have questions, please call your  "primary care clinic.  If you do not have a primary care provider, please call [phone] and they will assist you.      [employer] is an electronic gateway that provides easy, online access to your medical records. With [employer], you can request a clinic appointment, read your test results, renew a prescription or communicate with your care team.     To access your existing account, please contact your [facility] or call [phone] for assistance.        Care EveryWhere ID     This is your Care EveryWhere ID. This could be used by other organizations to access your [place] medical records  [id number]        Your Vitals Were     Pulse Height Last Period BMI (Body Mass Index)          80 1.803 m (5' 11\") 03/25/2012 23.01 kg/m2         Blood Pressure from Last 3 Encounters:   04/07/17 104/69   03/20/17 105/72   01/11/17 105/70    Weight from Last 3 Encounters:   04/07/17 74.8 kg (165 lb)   03/20/17 75 kg (165 lb 6.4 oz)   01/11/17 76.4 kg (168 lb 6.4 oz)              We Performed the Following     Remove Cerumen          Today's Medication Changes          These changes are accurate as of: 4/7/17 10:13 AM.  If you have any questions, ask your nurse or doctor.               Stop taking these medicines if you haven't already. Please contact your care team if you have questions.     clonazePAM 0.5 MG tablet   Commonly known as:  klonoPIN   Stopped by:  Armida Avendano MD                    Primary Care Provider Office Phone # Fax #    Sherice Jamil MD PhD [phone]        [facility] [address] [facility] [address]  [facility] [address]        Thank you!     Thank you for choosing [facility]  for your care. Our goal is always to provide you with excellent care. Hearing back from our patients is one way we can continue to improve our services. Please take a few minutes to complete the written survey that you may receive in the mail after your visit " with us. Thank you!             Your Updated Medication List - Protect others around you: Learn how to safely use, store and throw away your medicines at www.disposemymeds.org.          This list is accurate as of: 4/7/17 10:13 AM.  Always use your most recent med list.                   Brand Name Dispense Instructions for use    Calcium-Magnesium 250-125 MG Tabs      (Chewables) take 4 daily       clobetasol 0.05 % ointment    TEMOVATE    60 g    Apply topically 2 times daily To rash on feet  Along with ketoconazole until clear. May occlude with socks over medications at bedtime to help penetrate.       estradiol 0.0375 MG/24HR BIW patch    VIVELLE-DOT    8 patch    Place 1 patch onto the skin twice a week       FISH OIL PO      Take 2 daily       PROBIOTIC DAILY PO          progesterone 100 MG capsule    PROMETRIUM    90 capsule    Take 1 capsule (100 mg) by mouth daily       vitamin D 2000 UNITS Caps      Take 2 daily

## 2017-04-07 NOTE — PROGRESS NOTES
HPI  Ear wax removal.  Denies any drainage, dizziness or vertigo. States tinnitus on both ears for years. No allergies.    Review of Systems   Constitutional: Negative.    HENT: Negative.    Eyes: Negative for blurred vision and double vision.   Respiratory: Negative for cough.    Gastrointestinal: Negative for heartburn, nausea and vomiting.   Skin: Negative.    Neurological: Negative for dizziness, tingling and tremors.   Endo/Heme/Allergies: Negative for environmental allergies. Does not bruise/bleed easily.         Physical Exam   Constitutional: She is well-developed, well-nourished, and in no distress.   HENT:   Head: Normocephalic and atraumatic.   Right Ear: Tympanic membrane and external ear normal. No drainage, swelling or tenderness. No middle ear effusion. No decreased hearing is noted.   Left Ear: No drainage, swelling or tenderness.  No middle ear effusion. No decreased hearing is noted.   Nose: Nose normal. No mucosal edema, rhinorrhea or septal deviation.   Mouth/Throat: Uvula is midline, oropharynx is clear and moist and mucous membranes are normal.   Eyes: Pupils are equal, round, and reactive to light.   Neck: Neck supple. No tracheal deviation present. No thyromegaly present.   Lymphadenopathy:     She has no cervical adenopathy.     Ear wax removal: She was seen in the room. Right ear canal was filled with ear wax 100% that was suctioned with 7 suction. Ear drum appeared to be intact. Left ear was also suctioned. Left ear drum was also intact. She tolerated the procedure well.    A/P  F/u once a year.

## 2017-04-07 NOTE — NURSING NOTE
"Casie Harris's goals for this visit include:   Chief Complaint   Patient presents with     Consult     Ear wax build up       She requests these members of her care team be copied on today's visit information: yes      PCP: Sherice Jamil    Referring Provider:  Uche Zapien MD  [address] [address]    Chief Complaint   Patient presents with     Consult     Ear wax build up       Initial /69  Pulse 80  Ht 1.803 m (5' 11\")  Wt 74.8 kg (165 lb)  LMP 03/25/2012  BMI 23.01 kg/m2 Estimated body mass index is 23.01 kg/(m^2) as calculated from the following:    Height as of this encounter: 1.803 m (5' 11\").    Weight as of this encounter: 74.8 kg (165 lb).  Medication Reconciliation: complete    "

## 2017-05-01 ENCOUNTER — TELEPHONE (OUTPATIENT)
Dept: OTOLARYNGOLOGY | Facility: CLINIC | Age: 57
End: 2017-05-01

## 2017-05-01 DIAGNOSIS — Z00.00 ROUTINE GENERAL MEDICAL EXAMINATION AT A HEALTH CARE FACILITY: ICD-10-CM

## 2017-05-01 LAB
CHOLEST SERPL-MCNC: 216 MG/DL
HDLC SERPL-MCNC: 83 MG/DL
LDLC SERPL CALC-MCNC: 115 MG/DL
NONHDLC SERPL-MCNC: 133 MG/DL
TRIGL SERPL-MCNC: 92 MG/DL

## 2017-05-01 PROCEDURE — 36415 COLL VENOUS BLD VENIPUNCTURE: CPT | Performed by: FAMILY MEDICINE

## 2017-05-01 PROCEDURE — 80061 LIPID PANEL: CPT | Performed by: FAMILY MEDICINE

## 2017-05-01 NOTE — TELEPHONE ENCOUNTER
"[facility] Call Center    Phone Message    Name of Caller: Casie    Phone Number: Cell number on file:    Telephone Information:   Mobile [phone]       Best time to return call: any    May a detailed message be left on voicemail: yes    Relation to patient: Self    Reason for Call: Other: Patient is calling to speak with clinic about \" loud ringing\" the patient state she has been experiencing since she got her ears cleaned on  04/07/2017. Please advise    Action Taken: Message routed to:  Adult Clinics: ENT p [id number]    "

## 2017-05-01 NOTE — TELEPHONE ENCOUNTER
Returned phone call to pt who states tinnitus has increased since ears were cleaned and now right ear feels clogged and is hurting.  Audiology appt and return appointment with Dr Avendano scheduled for 5/2/17.  Fauzia Noel RN

## 2017-05-02 ENCOUNTER — OFFICE VISIT (OUTPATIENT)
Dept: AUDIOLOGY | Facility: CLINIC | Age: 57
End: 2017-05-02
Payer: COMMERCIAL

## 2017-05-02 ENCOUNTER — OFFICE VISIT (OUTPATIENT)
Dept: OTOLARYNGOLOGY | Facility: CLINIC | Age: 57
End: 2017-05-02
Payer: COMMERCIAL

## 2017-05-02 DIAGNOSIS — H90.3 BILATERAL SENSORINEURAL HEARING LOSS: Primary | ICD-10-CM

## 2017-05-02 DIAGNOSIS — H90.3 SENSORINEURAL HEARING LOSS (SNHL), BILATERAL: Primary | ICD-10-CM

## 2017-05-02 PROCEDURE — 99213 OFFICE O/P EST LOW 20 MIN: CPT | Performed by: OTOLARYNGOLOGY

## 2017-05-02 PROCEDURE — 92550 TYMPANOMETRY & REFLEX THRESH: CPT | Performed by: AUDIOLOGIST

## 2017-05-02 PROCEDURE — 92557 COMPREHENSIVE HEARING TEST: CPT | Performed by: AUDIOLOGIST

## 2017-05-02 ASSESSMENT — ENCOUNTER SYMPTOMS
DIZZINESS: 0
VOMITING: 0
SORE THROAT: 0
TREMORS: 0
TINGLING: 0
BRUISES/BLEEDS EASILY: 0
COUGH: 0
STRIDOR: 0
HEARTBURN: 0
BLURRED VISION: 0
CONSTITUTIONAL NEGATIVE: 1
NAUSEA: 0
DOUBLE VISION: 0

## 2017-05-02 NOTE — MR AVS SNAPSHOT
After Visit Summary   5/2/2017    Casie Harris    MRN: [id number]           Patient Information     Date Of Birth          1960        Visit Information        Provider Department      5/2/2017 11:30 AM Carroll Guzman AuD [facility]        Today's Diagnoses     Bilateral sensorineural hearing loss    -  1       Follow-ups after your visit        Your next 10 appointments already scheduled     May 05, 2017 10:30 AM CDT   [facility]   [facility] ([facility])    [place] [facility] [address] [facility] [address]  [address]  [address]  [facility] [address]   [phone]            Jul 24, 2017  1:30 PM CDT   Return Visit with Sherice Jamil MD PhD   [facility] ([facility])    [place] [facility] [address]  [address]  [address]  [facility] [address]  [facility] [address]   [phone]              Who to contact     If you have questions or need follow up information about today's clinic visit or your schedule please contact [facility] directly at [phone].  Normal or non-critical lab and imaging results will be communicated to you by ResearchGatehart, letter or phone within 4 business days after the clinic has received the results. If you do not hear from us within 7 days, please contact the clinic through ResearchGatehart or phone. If you have a critical or abnormal lab result, we will notify you by phone as soon as possible.  Submit refill requests through [email or web address] or call your pharmacy and they will forward the refill request to us. Please allow 3 business days for your refill to be completed.          Additional Information About Your Visit        MyChart Information     [email or web address] gives you secure access to your electronic health record. If you see a primary care provider, you can also send messages to your care team and make appointments. If you have questions, please call your primary care  clinic.  If you do not have a primary care provider, please call [phone] and they will assist you.      Vettery is an electronic gateway that provides easy, online access to your medical records. With Vettery, you can request a clinic appointment, read your test results, renew a prescription or communicate with your care team.     To access your existing account, please contact your [facility] or call [phone] for assistance.        Care EveryWhere ID     This is your Care EveryWhere ID. This could be used by other organizations to access your [place] medical records  [id number]        Your Vitals Were     Last Period                   03/25/2012            Blood Pressure from Last 3 Encounters:   04/07/17 104/69   03/20/17 105/72   01/11/17 105/70    Weight from Last 3 Encounters:   04/07/17 165 lb (74.8 kg)   03/20/17 165 lb 6.4 oz (75 kg)   01/11/17 168 lb 6.4 oz (76.4 kg)              We Performed the Following     AUDIOGRAM/TYMPANOGRAM - INTERFACE     COMPREHENSIVE HEARING TEST     TYMPANOMETRY AND REFLEX THRESHOLD MEASUREMENTS        Primary Care Provider Office Phone # Fax #    Sherice Jamil MD PhD [phone]        [facility] [address] [facility] [address]  [facility] [address]        Thank you!     Thank you for choosing [facility]  for your care. Our goal is always to provide you with excellent care. Hearing back from our patients is one way we can continue to improve our services. Please take a few minutes to complete the written survey that you may receive in the mail after your visit with us. Thank you!             Your Updated Medication List - Protect others around you: Learn how to safely use, store and throw away your medicines at www.disposemymeds.org.          This list is accurate as of: 5/2/17 12:35 PM.  Always use your most recent med list.                   Brand Name Dispense Instructions for use    Calcium-Magnesium  250-125 MG Tabs      (Chewables) take 4 daily       clobetasol 0.05 % ointment    TEMOVATE    60 g    Apply topically 2 times daily To rash on feet  Along with ketoconazole until clear. May occlude with socks over medications at bedtime to help penetrate.       estradiol 0.0375 MG/24HR BIW patch    VIVELLE-DOT    8 patch    Place 1 patch onto the skin twice a week       FISH OIL PO      Take 2 daily       PROBIOTIC DAILY PO          progesterone 100 MG capsule    PROMETRIUM    90 capsule    Take 1 capsule (100 mg) by mouth daily       vitamin D 2000 UNITS Caps      Take 2 daily

## 2017-05-02 NOTE — NURSING NOTE
"Casie Harris's goals for this visit include:   Chief Complaint   Patient presents with     Tinnitus     tinnitus, ear pain       She requests these members of her care team be copied on today's visit information: Sherice Jamil      PCP: Sherice Jamil    Referring Provider:  No referring provider defined for this encounter.    Chief Complaint   Patient presents with     Tinnitus     tinnitus, ear pain       Initial LMP 03/25/2012 Estimated body mass index is 23.01 kg/(m^2) as calculated from the following:    Height as of 4/7/17: 1.803 m (5' 11\").    Weight as of 4/7/17: 74.8 kg (165 lb).  Medication Reconciliation: complete    Do you need any medication refills at today's visit? No    Fauzia Noel RN    "

## 2017-05-02 NOTE — MR AVS SNAPSHOT
After Visit Summary   5/2/2017    Casie Harris    MRN: [id number]           Patient Information     Date Of Birth          1960        Visit Information        Provider Department      5/2/2017 12:30 PM Armida Avendano MD [facility]        Today's Diagnoses     Sensorineural hearing loss (SNHL), bilateral    -  1       Follow-ups after your visit        Your next 10 appointments already scheduled     May 05, 2017 10:30 AM CDT   [facility] with [facility] Nurse   [facility] ([facility])    [place] [facility] [address] [facility] [address]  [address]  [address]  [facility] [address]   [phone]            Jul 24, 2017  1:30 PM CDT   Return Visit with Sherice Jamil MD PhD   [facility] ([facility])    [place] [address]  [address]  [address]  [facility] [address]  [facility] [address]   [phone]              Who to contact     If you have questions or need follow up information about today's clinic visit or your schedule please contact [facility] directly at [phone].  Normal or non-critical lab and imaging results will be communicated to you by Higher Learning Technologieshart, letter or phone within 4 business days after the clinic has received the results. If you do not hear from us within 7 days, please contact the clinic through [employer] Learning Technologieshart or phone. If you have a critical or abnormal lab result, we will notify you by phone as soon as possible.  Submit refill requests through Crushpath or call your pharmacy and they will forward the refill request to us. Please allow 3 business days for your refill to be completed.          Additional Information About Your Visit        MyChart Information     Crushpath gives you secure access to your electronic health record. If you see a primary care provider, you can also send messages to your care team and make appointments. If you have questions, please call your  primary care clinic.  If you do not have a primary care provider, please call [phone] and they will assist you.      Seculert is an electronic gateway that provides easy, online access to your medical records. With Seculert, you can request a clinic appointment, read your test results, renew a prescription or communicate with your care team.     To access your existing account, please contact your [facility] or call [phone] for assistance.        Care EveryWhere ID     This is your Care EveryWhere ID. This could be used by other organizations to access your [place] medical records  [id number]        Your Vitals Were     Last Period                   03/25/2012            Blood Pressure from Last 3 Encounters:   04/07/17 104/69   03/20/17 105/72   01/11/17 105/70    Weight from Last 3 Encounters:   04/07/17 74.8 kg (165 lb)   03/20/17 75 kg (165 lb 6.4 oz)   01/11/17 76.4 kg (168 lb 6.4 oz)              Today, you had the following     No orders found for display       Primary Care Provider Office Phone # Fax #    Sherice Jamil MD PhD [phone]        [facility] [address] [facility] [address]  [facility] [address]        Thank you!     Thank you for choosing [facility]  for your care. Our goal is always to provide you with excellent care. Hearing back from our patients is one way we can continue to improve our services. Please take a few minutes to complete the written survey that you may receive in the mail after your visit with us. Thank you!             Your Updated Medication List - Protect others around you: Learn how to safely use, store and throw away your medicines at www.disposemymeds.org.          This list is accurate as of: 5/2/17 12:51 PM.  Always use your most recent med list.                   Brand Name Dispense Instructions for use    Calcium-Magnesium 250-125 MG Tabs      (Chewables) take 4 daily       clobetasol 0.05 %  ointment    TEMOVATE    60 g    Apply topically 2 times daily To rash on feet  Along with ketoconazole until clear. May occlude with socks over medications at bedtime to help penetrate.       estradiol 0.0375 MG/24HR BIW patch    VIVELLE-DOT    8 patch    Place 1 patch onto the skin twice a week       FISH OIL PO      Take 2 daily       PROBIOTIC DAILY PO          progesterone 100 MG capsule    PROMETRIUM    90 capsule    Take 1 capsule (100 mg) by mouth daily       vitamin D 2000 UNITS Caps      Take 2 daily

## 2017-05-02 NOTE — PROGRESS NOTES
HPI    This pleasant patient is back with bilateral tinnitus and mild pressure in her right ear. States that her symptoms started after ear wax removal which was done with suctioning. Denies any ear drainage, pain, dizziness or vertigo. She was told that she could grind her teeth but no hx of TMJ issues.  I can see today's hearing test: Mild SNHL in higher freq. Excellent recognition; normal tymps; reflexes are present.      Review of Systems   Constitutional: Negative.    HENT: Positive for ear pain, hearing loss and tinnitus. Negative for congestion, ear discharge, nosebleeds and sore throat.    Eyes: Negative for blurred vision and double vision.   Respiratory: Negative for cough and stridor.    Gastrointestinal: Negative for heartburn, nausea and vomiting.   Skin: Negative.    Neurological: Negative for dizziness, tingling and tremors.   Endo/Heme/Allergies: Negative for environmental allergies. Does not bruise/bleed easily.         Physical Exam   Constitutional: She is well-developed, well-nourished, and in no distress.   HENT:   Head: Normocephalic and atraumatic.   Right Ear: Tympanic membrane, external ear and ear canal normal. No drainage, swelling or tenderness. No middle ear effusion. Decreased hearing is noted.   Left Ear: Tympanic membrane, external ear and ear canal normal. No drainage, swelling or tenderness.  No middle ear effusion. Decreased hearing is noted.   Nose: Nose normal. No mucosal edema.   Mouth/Throat: Uvula is midline, oropharynx is clear and moist and mucous membranes are normal. No oropharyngeal exudate.   Eyes: Pupils are equal, round, and reactive to light.   Neck: Neck supple. No tracheal deviation present. No thyromegaly present.   Lymphadenopathy:     She has no cervical adenopathy.     A/P  No infection or complications after ear wax removal by suctioning. I will do watchful waiting and see her as needed.

## 2017-05-02 NOTE — PROGRESS NOTES
AUDIOLOGY REPORT    SUMMARY: Audiology visit completed. See audiogram for results.    RECOMMENDATIONS: Follow-up with ENT.    Sam Gallardo  Doctor of Audiology  [place] License # [id number]

## 2017-05-25 ENCOUNTER — TRANSFERRED RECORDS (OUTPATIENT)
Dept: HEALTH INFORMATION MANAGEMENT | Facility: CLINIC | Age: 57
End: 2017-05-25

## 2017-07-08 ENCOUNTER — HEALTH MAINTENANCE LETTER (OUTPATIENT)
Age: 57
End: 2017-07-08

## 2017-07-24 ENCOUNTER — OFFICE VISIT (OUTPATIENT)
Dept: FAMILY MEDICINE | Facility: CLINIC | Age: 57
End: 2017-07-24
Attending: FAMILY MEDICINE
Payer: COMMERCIAL

## 2017-07-24 VITALS
HEART RATE: 76 BPM | BODY MASS INDEX: 22.58 KG/M2 | DIASTOLIC BLOOD PRESSURE: 70 MMHG | HEIGHT: 71 IN | WEIGHT: 161.3 LBS | SYSTOLIC BLOOD PRESSURE: 113 MMHG

## 2017-07-24 DIAGNOSIS — H93.13 BILATERAL TINNITUS: Primary | ICD-10-CM

## 2017-07-24 PROCEDURE — 99213 OFFICE O/P EST LOW 20 MIN: CPT | Mod: ZF

## 2017-07-24 ASSESSMENT — PAIN SCALES - GENERAL: PAINLEVEL: NO PAIN (0)

## 2017-07-24 NOTE — LETTER
7/24/2017       RE: Casie Harris  [address] [place] [address]     Dear Colleague,    Thank you for referring your patient, Casie Harris, to the [facility] at [facility]. Please see a copy of my visit note below.    Concern: return HRT and tinnitus     HPI: She presents for follow up for hormone replacement therapy that was started 3/2017. She has been using vivelle dot twice weekly and progesterone 100mg daily with reduction of hot flashes and vaginal dryness. No vaginal bleeding since starting HRT. She would like to continue with current hormone replacement therapy.     She is concerned that the HRT may be contributing to new onset of bilateral tinnitus in 4/2017 after ear wax removal by suctioning. The tinnitus is high-pitched and sounds like a loud buzzing or hissing noise. She reports tinnitus in both ears, but worse in the left ear. She saw a [facility] physician 5/2017 who recommended audiology referral, which showed mild sensorineural hearing loss in both ears. She also received a second opinion from another ENT. She has tried a white noise generator at night with improvement. She desires to follow up with another ENT physician, Dr. Rosangela Best at the [facility] who does research in the field of tinnitus.     She is using clobetasol steroid cream as needed for itching for lichen sclerosis of the vulvar area.     States anxiety is controlled. Not currently on any meds.     ROS:   General: None  Head/Eyes: None  Eyes/Nose/Throat: ringing in ears and hearing loss  Cardiovascular: none  Respiratory: none  Breast: none  Gastrointestinal: None  Genitourinary: None  Sexual function: None  Musculoskeletal: joint pain, stiffness, and back pain  Skin: Rashes  Neurological: None  Mental Health: Anxiety, nervousness, and difficulty sleeping   Endocrine: None    Past Medical History:   Diagnosis Date     Arthritis       "Colon polyps 2014     Generalised anxiety disorder      Lichen sclerosus      Current Outpatient Prescriptions   Medication     Probiotic Product (PROBIOTIC DAILY PO)     estradiol (VIVELLE-DOT) 0.0375 MG/24HR BIW patch     progesterone (PROMETRIUM) 100 MG capsule     Calcium-Magnesium 250-125 MG TABS     Cholecalciferol (VITAMIN D) 2000 UNIT CAPS     Omega-3 Fatty Acids (FISH OIL PO)     clobetasol (TEMOVATE) 0.05 % ointment     No current facility-administered medications for this visit.      Allergies   Allergen Reactions     Nkda [No Known Drug Allergies]      Social history:   No current or previous tobacco use. Alcohol use with wine at dinner daily. No recreational drug use.     Family History   Problem Relation Age of Onset     Prostate Cancer Father      HEART DISEASE Father      Cardiovascular Father      AF CHF     Neurologic Disorder Father      trigeminal neuralgia     CANCER Father      BCC     CEREBROVASCULAR DISEASE Sister      Lipids Sister      HEART DISEASE Maternal Grandfather      HEART DISEASE Paternal Grandmother      Breast Cancer Paternal Aunt      Suicide Sister      Depression Sister      MENTAL ILLNESS Sister      Objective  /70  Pulse 76  Ht 1.803 m (5' 11\")  Wt 73.2 kg (161 lb 4.8 oz)  LMP 03/25/2012  BMI 22.5 kg/m2  Physical exam:  General: healthy women in no distress  HEENT: Ears clear with good light reflex. No drainage.   Neck: Supple, no LAD. Thyroid not palpable  Heart: S1/S2 normal, no murmur, click, rub, or gallop, regular rate and rhythm  Lungs: Chest is clear to auscultation, no wheezing, rhonchi or rales   Neuro: normal without focal findings, mental status intact, speech normal, affect is appropriate    A/P: Casie was seen today for recheck medication.    Diagnoses and all orders for this visit:    Bilateral tinnitus  ENT referral order placed for Dr. Rosangela Best at the [facility] for further evaluation and management. Encouraged patient to consider " acupuncture, cognitive behavioral therapy or biofeedback as discussed by last ENT.   -     OTOLARYNGOLOGY REFERRAL; Future    Hormone replacement therapy  Continue vivelle-dot patch twice weekly and progesterone 100mg daily. Discussed that there is no adverse events of tinnitus associated with hormone replacement therapy. Follow up in one year.     Corrine Duran DO  [facility] PGY2      I saw the patient with the resident and agree with the findings and the plan of care as documented in the resident's note.  I personally reviewed history and exam findings.  Key findings  Ears normal and cranial nerves normal   Sherice Jamil MD, PhD

## 2017-07-24 NOTE — MR AVS SNAPSHOT
After Visit Summary   7/24/2017    Casie Harris    MRN: [id number]           Patient Information     Date Of Birth          1960        Visit Information        Provider Department      7/24/2017 1:30 PM Sherice Jamil MD PhD [facility]        Today's Diagnoses     Bilateral tinnitus    -  1      Care Instructions    Stay on hormone therapy -   See me in 1 nidhi  ENT referral ordered - you need to set this up           Follow-ups after your visit        Additional Services     OTOLARYNGOLOGY REFERRAL       Your provider has referred you to: [facility]: Adult [facility] Clinic (Otolaryngology) - [place] [phone]  [email or web address] Wants to see Dr Nic Best   - has tinnitus     Please be aware that coverage of these services is subject to the terms and limitations of your health insurance plan.  Call member services at your health plan with any benefit or coverage questions.      Please bring the following with you to your appointment:    (1) Any X-Rays, CTs or MRIs which have been performed.  Contact the facility where they were done to arrange for  prior to your scheduled appointment.   (2) List of current medications  (3) This referral request   (4) Any documents/labs given to you for this referral                  Follow-up notes from your care team     Return if symptoms worsen or fail to improve.      Future tests that were ordered for you today     Open Future Orders        Priority Expected Expires Ordered    OTOLARYNGOLOGY REFERRAL Routine 7/24/2017 12/24/2017 7/24/2017            Who to contact     Please call your clinic at [phone] to:    Ask questions about your health    Make or cancel appointments    Discuss your medicines    Learn about your test results    Speak to your doctor   If you have compliments or concerns about an experience at your clinic, or if you wish to file a  "complaint, please contact [facility] Patient Relations at [phone] or email us at [email or web address]         Additional Information About Your Visit        [employer] Information     [employer] gives you secure access to your electronic health record. If you see a primary care provider, you can also send messages to your care team and make appointments. If you have questions, please call your primary care clinic.  If you do not have a primary care provider, please call [phone] and they will assist you.      [employer] is an electronic gateway that provides easy, online access to your medical records. With [employer], you can request a clinic appointment, read your test results, renew a prescription or communicate with your care team.     To access your existing account, please contact your [facility] or call [phone] for assistance.        Care EveryWhere ID     This is your Care EveryWhere ID. This could be used by other organizations to access your [place] medical records  [id number]        Your Vitals Were     Pulse Height Last Period BMI (Body Mass Index)          76 1.803 m (5' 11\") 03/25/2012 22.5 kg/m2         Blood Pressure from Last 3 Encounters:   07/24/17 113/70   04/07/17 104/69   03/20/17 105/72    Weight from Last 3 Encounters:   07/24/17 73.2 kg (161 lb 4.8 oz)   04/07/17 74.8 kg (165 lb)   03/20/17 75 kg (165 lb 6.4 oz)               Primary Care Provider Office Phone # Fax #    Sherice Balaji Jamil MD PhD [phone]        PH[facility]IC[facility]ANS [address] [place] [address]        Equal Access to Services     AMBREEN FERRARI : Hadii sara Hudson, haydee hollis, jaswinder souza. So [facility] [phone].    ATENCIÓN: Si habla español, tiene a paez disposición servicios gratuitos de asistencia lingüística. Llame al [phone].    We comply with " applicable federal civil rights laws and [place] laws. We do not discriminate on the basis of race, color, national origin, age, disability sex, sexual orientation or gender identity.            Thank you!     Thank you for choosing [facility]  for your care. Our goal is always to provide you with excellent care. Hearing back from our patients is one way we can continue to improve our services. Please take a few minutes to complete the written survey that you may receive in the mail after your visit with us. Thank you!             Your Updated Medication List - Protect others around you: Learn how to safely use, store and throw away your medicines at www.disposemymeds.org.          This list is accurate as of: 7/24/17  2:25 PM.  Always use your most recent med list.                   Brand Name Dispense Instructions for use Diagnosis    Calcium-Magnesium 250-125 MG Tabs      (Chewables) take 4 daily        clobetasol 0.05 % ointment    TEMOVATE    60 g    Apply topically 2 times daily To rash on feet  Along with ketoconazole until clear. May occlude with socks over medications at bedtime to help penetrate.    Eczematous dermatitis       estradiol 0.0375 MG/24HR BIW patch    VIVELLE-DOT    8 patch    Place 1 patch onto the skin twice a week    Vasomotor symptoms due to menopause       FISH OIL PO      Take 2 daily        PROBIOTIC DAILY PO           progesterone 100 MG capsule    PROMETRIUM    90 capsule    Take 1 capsule (100 mg) by mouth daily    Vasomotor symptoms due to menopause       vitamin D 2000 UNITS Caps      Take 2 daily

## 2017-07-24 NOTE — PROGRESS NOTES
Concern: return HRT and tinnitus     HPI: She presents for follow up for hormone replacement therapy that was started 3/2017. She has been using vivelle dot twice weekly and progesterone 100mg daily with reduction of hot flashes and vaginal dryness. No vaginal bleeding since starting HRT. She would like to continue with current hormone replacement therapy.     She is concerned that the HRT may be contributing to new onset of bilateral tinnitus in 4/2017 after ear wax removal by suctioning. The tinnitus is high-pitched and sounds like a loud buzzing or hissing noise. She reports tinnitus in both ears, but worse in the left ear. She saw a [facility] physician 5/2017 who recommended audiology referral, which showed mild sensorineural hearing loss in both ears. She also received a second opinion from another ENT. She has tried a white noise generator at night with improvement. She desires to follow up with another ENT physician, Dr. Rosangela Best at the [facility] who does research in the field of tinnitus.     She is using clobetasol steroid cream as needed for itching for lichen sclerosis of the vulvar area.     States anxiety is controlled. Not currently on any meds.     ROS:   General: None  Head/Eyes: None  Eyes/Nose/Throat: ringing in ears and hearing loss  Cardiovascular: none  Respiratory: none  Breast: none  Gastrointestinal: None  Genitourinary: None  Sexual function: None  Musculoskeletal: joint pain, stiffness, and back pain  Skin: Rashes  Neurological: None  Mental Health: Anxiety, nervousness, and difficulty sleeping   Endocrine: None    Past Medical History:   Diagnosis Date     Arthritis      Colon polyps 2014     Generalised anxiety disorder      Lichen sclerosus      Current Outpatient Prescriptions   Medication     Probiotic Product (PROBIOTIC DAILY PO)     estradiol (VIVELLE-DOT) 0.0375 MG/24HR BIW patch     progesterone (PROMETRIUM) 100 MG capsule     Calcium-Magnesium 250-125 MG TABS  "    Cholecalciferol (VITAMIN D) 2000 UNIT CAPS     Omega-3 Fatty Acids (FISH OIL PO)     clobetasol (TEMOVATE) 0.05 % ointment     No current facility-administered medications for this visit.      Allergies   Allergen Reactions     Nkda [No Known Drug Allergies]      Social history:   No current or previous tobacco use. Alcohol use with wine at dinner daily. No recreational drug use.     Family History   Problem Relation Age of Onset     Prostate Cancer Father      HEART DISEASE Father      Cardiovascular Father      AF CHF     Neurologic Disorder Father      trigeminal neuralgia     CANCER Father      BCC     CEREBROVASCULAR DISEASE Sister      Lipids Sister      HEART DISEASE Maternal Grandfather      HEART DISEASE Paternal Grandmother      Breast Cancer Paternal Aunt      Suicide Sister      Depression Sister      MENTAL ILLNESS Sister      Objective  /70  Pulse 76  Ht 1.803 m (5' 11\")  Wt 73.2 kg (161 lb 4.8 oz)  LMP 03/25/2012  BMI 22.5 kg/m2  Physical exam:  General: healthy women in no distress  HEENT: Ears clear with good light reflex. No drainage.   Neck: Supple, no LAD. Thyroid not palpable  Heart: S1/S2 normal, no murmur, click, rub, or gallop, regular rate and rhythm  Lungs: Chest is clear to auscultation, no wheezing, rhonchi or rales   Neuro: normal without focal findings, mental status intact, speech normal, affect is appropriate    A/P: Casie was seen today for recheck medication.    Diagnoses and all orders for this visit:    Bilateral tinnitus  ENT referral order placed for Dr. Rosangela Best at the [facility] for further evaluation and management. Encouraged patient to consider acupuncture, cognitive behavioral therapy or biofeedback as discussed by last ENT.   -     OTOLARYNGOLOGY REFERRAL; Future    Hormone replacement therapy  Continue vivelle-dot patch twice weekly and progesterone 100mg daily. Discussed that there is no adverse events of tinnitus associated with hormone " replacement therapy. Follow up in one year.     Corrine Duran DO  [facility] PGY2      I saw the patient with the resident and agree with the findings and the plan of care as documented in the resident's note.  I personally reviewed history and exam findings.  Key findings  Ears normal and cranial nerves normal   Sherice Jamil MD, PhD

## 2017-08-03 ENCOUNTER — PRE VISIT (OUTPATIENT)
Dept: OTOLARYNGOLOGY | Facility: CLINIC | Age: 57
End: 2017-08-03

## 2017-08-03 DIAGNOSIS — N95.1 VASOMOTOR SYMPTOMS DUE TO MENOPAUSE: ICD-10-CM

## 2017-08-03 RX ORDER — ESTRADIOL 0.04 MG/D
1 PATCH, EXTENDED RELEASE TRANSDERMAL
Qty: 24 PATCH | Refills: 3 | Status: SHIPPED | OUTPATIENT
Start: 2017-08-03 | End: 2018-08-24

## 2017-08-03 NOTE — TELEPHONE ENCOUNTER
Received refill request for HRT.  Last in clinic 7/24/17 and per Dr Jamil's note :Stay on hormone therapy -   See me in 1 nidhi  Refills for Vivelle dot and  Progesterone prescribed for one year per Dr Jamil's plan.

## 2017-08-03 NOTE — TELEPHONE ENCOUNTER
1.  Date/reason for appt: 8/17/17 - Tinnitus  2.  Referring provider: [facility], Dr. Sherice Jamil  3.  Call to patient (Yes / No - short description): No, recs in epic  4.  Previous care at:    [facility]    [facility] (recs scanned in media tab)

## 2017-08-14 DIAGNOSIS — H93.19 TINNITUS: Primary | ICD-10-CM

## 2017-08-17 ENCOUNTER — OFFICE VISIT (OUTPATIENT)
Dept: AUDIOLOGY | Facility: CLINIC | Age: 57
End: 2017-08-17

## 2017-08-17 ENCOUNTER — OFFICE VISIT (OUTPATIENT)
Dept: OTOLARYNGOLOGY | Facility: CLINIC | Age: 57
End: 2017-08-17

## 2017-08-17 VITALS — WEIGHT: 159 LBS | BODY MASS INDEX: 22.26 KG/M2 | HEIGHT: 71 IN

## 2017-08-17 DIAGNOSIS — H90.3 SENSORINEURAL HEARING LOSS (SNHL) OF BOTH EARS: Primary | ICD-10-CM

## 2017-08-17 DIAGNOSIS — H93.12 TINNITUS OF LEFT EAR: ICD-10-CM

## 2017-08-17 DIAGNOSIS — H93.13 BILATERAL TINNITUS: ICD-10-CM

## 2017-08-17 NOTE — NURSING NOTE
Chief Complaint   Patient presents with     Consult     Tinnitus     Rianna Barrera Medical Assistant

## 2017-08-17 NOTE — MR AVS SNAPSHOT
After Visit Summary   8/17/2017    Casie Harris    MRN: [id number]           Patient Information     Date Of Birth          1960        Visit Information        Provider Department      8/17/2017 1:15 PM Rosangela Best MD [facility]        Today's Diagnoses     Bilateral tinnitus          Care Instructions    You will have an MRI done - you will be called with the results.  You may call [facility] for Diagnostic Imagine- they have an open sided MRI.  [phone]   Please call our clinic for any questions,concerns,or worsening symptoms.      Clinic #[phone]       Option 3  for the triage nurse.   [facility] Nurse          Follow-ups after your visit        Future tests that were ordered for you today     Open Future Orders        Priority Expected Expires Ordered    MRI Temporal Bone/IAC With and W/O Contrast Routine  8/17/2018 8/17/2017            Who to contact     Please call your clinic at [phone] to:    Ask questions about your health    Make or cancel appointments    Discuss your medicines    Learn about your test results    Speak to your doctor   If you have compliments or concerns about an experience at your clinic, or if you wish to file a complaint, please contact [facility] Patient Relations at [phone] or email us at [email or web address]         Additional Information About Your Visit        MyChart Information     [employer] gives you secure access to your electronic health record. If you see a primary care provider, you can also send messages to your care team and make appointments. If you have questions, please call your primary care clinic.  If you do not have a primary care provider, please call [phone] and they will assist you.      [employer] is an electronic gateway that provides easy, online access to your medical records. With [employer], you can request a clinic appointment, read your test results,  "renew a prescription or communicate with your care team.     To access your existing account, please contact your [facility] or call [phone] for assistance.        Care EveryWhere ID     This is your Care EveryWhere ID. This could be used by other organizations to access your [place] medical records  [id number]        Your Vitals Were     Height Last Period BMI (Body Mass Index)             1.803 m (5' 10.98\") 03/25/2012 22.19 kg/m2          Blood Pressure from Last 3 Encounters:   07/24/17 113/70   04/07/17 104/69   03/20/17 105/72    Weight from Last 3 Encounters:   08/17/17 72.1 kg (159 lb)   07/24/17 73.2 kg (161 lb 4.8 oz)   04/07/17 74.8 kg (165 lb)              We Performed the Following     OTOLARYNGOLOGY REFERRAL        Primary Care Provider Office Phone # Fax #    Sherice Jamil MD PhD [phone]       [address] [place] [address]        Equal Access to Services     [facility]: Hadii aad ku hadasho Soomaali, waaxda luqadaha, qaybta kaalmada adeegyada, waxgiuseppe vora . So [facility] [phone].    ATENCIÓN: Si habla español, tiene a paez disposición servicios gratuitos de asistencia lingüística. Llame al [phone].    We comply with applicable federal civil rights laws and [place] laws. We do not discriminate on the basis of race, color, national origin, age, disability sex, sexual orientation or gender identity.            Thank you!     Thank you for choosing [facility]  for your care. Our goal is always to provide you with excellent care. Hearing back from our patients is one way we can continue to improve our services. Please take a few minutes to complete the written survey that you may receive in the mail after your visit with us. Thank you!             Your Updated Medication List - Protect others around you: Learn how to safely use, store and throw away your medicines at " www.disposemymeds.org.          This list is accurate as of: 8/17/17  1:44 PM.  Always use your most recent med list.                   Brand Name Dispense Instructions for use Diagnosis    Calcium-Magnesium 250-125 MG Tabs      (Chewables) take 4 daily        clobetasol 0.05 % ointment    TEMOVATE    60 g    Apply topically 2 times daily To rash on feet  Along with ketoconazole until clear. May occlude with socks over medications at bedtime to help penetrate.    Eczematous dermatitis       estradiol 0.0375 MG/24HR BIW patch    VIVELLE-DOT    24 patch    Place 1 patch onto the skin twice a week    Vasomotor symptoms due to menopause       FISH OIL PO      Take 2 daily        PROBIOTIC DAILY PO           progesterone 100 MG capsule    PROMETRIUM    90 capsule    Take 1 capsule (100 mg) by mouth daily    Vasomotor symptoms due to menopause       vitamin D 2000 UNITS Caps      Take 2 daily

## 2017-08-17 NOTE — MR AVS SNAPSHOT
After Visit Summary   8/17/2017    Casie Harris    MRN: [id number]           Patient Information     Date Of Birth          1960        Visit Information        Provider Department      8/17/2017 11:30 AM Antoinette Howell AuD [facility] Audiology        Today's Diagnoses     Sensorineural hearing loss (SNHL) of both ears    -  1    Tinnitus of left ear           Follow-ups after your visit        Your next 10 appointments already scheduled     Aug 17, 2017  1:15 PM CDT   (Arrive by 1:00 PM)   New Patient Visit with Rosangela Best MD   [facility] Ear Nose and Throat ([facility] [place])    [address] [facility]  [address] [facility] [address] [phone]              Who to contact     Please call your clinic at [phone] to:    Ask questions about your health    Make or cancel appointments    Discuss your medicines    Learn about your test results    Speak to your doctor   If you have compliments or concerns about an experience at your clinic, or if you wish to file a complaint, please contact [facility] Patient Relations at [phone] or email us at [email or web address]         Additional Information About Your Visit        MyChart Information     [employer] gives you secure access to your electronic health record. If you see a primary care provider, you can also send messages to your care team and make appointments. If you have questions, please call your primary care clinic.  If you do not have a primary care provider, please call [phone] and they will assist you.      [employer] is an electronic gateway that provides easy, online access to your medical records. With [employer], you can request a clinic appointment, read your test results, renew a prescription or communicate with your care team.     To access your existing account, please contact your [facility] or call [phone] for  assistance.        Care EveryWhere ID     This is your Care EveryWhere ID. This could be used by other organizations to access your [facility] medical records  [id number]        Your Vitals Were     Last Period                   03/25/2012            Blood Pressure from Last 3 Encounters:   07/24/17 113/70   04/07/17 104/69   03/20/17 105/72    Weight from Last 3 Encounters:   07/24/17 73.2 kg (161 lb 4.8 oz)   04/07/17 74.8 kg (165 lb)   03/20/17 75 kg (165 lb 6.4 oz)              We Performed the Following     AUDIOGRAM/TYMPANOGRAM - INTERFACE     AUDIOLOGY ADULT REFERRAL     [facility] Eval & Speech Recog ([address])     Tymps / Reflex   ([id number])        Primary Care Provider Office Phone # Fax #    Sherice Jamil MD PhD [phone]       [address] [place] [address]        Equal Access to Services     AMBREEN FERRARI [facility]: Hadii aad ku hadasho Soomaali, waaxda luqadaha, qaybta kaalmada adeegyada, waxay idiin hayaan low kharash diony . So [facility] [phone].    ATENCIÓN: Si habla español, tiene a paez disposición servicios gratuitos de asistencia lingüística. Llame al [phone].    We comply with applicable federal civil rights laws and [place] laws. We do not discriminate on the basis of race, color, national origin, age, disability sex, sexual orientation or gender identity.            Thank you!     Thank you for choosing [facility] AUDIOLOGY  for your care. Our goal is always to provide you with excellent care. Hearing back from our patients is one way we can continue to improve our services. Please take a few minutes to complete the written survey that you may receive in the mail after your visit with us. Thank you!             Your Updated Medication List - Protect others around you: Learn how to safely use, store and throw away your medicines at www.disposemymeds.org.          This list is accurate as of: 8/17/17 12:03 PM.  Always use your most recent med list.                    Brand Name Dispense Instructions for use Diagnosis    Calcium-Magnesium 250-125 MG Tabs      (Chewables) take 4 daily        clobetasol 0.05 % ointment    TEMOVATE    60 g    Apply topically 2 times daily To rash on feet  Along with ketoconazole until clear. May occlude with socks over medications at bedtime to help penetrate.    Eczematous dermatitis       estradiol 0.0375 MG/24HR BIW patch    VIVELLE-DOT    24 patch    Place 1 patch onto the skin twice a week    Vasomotor symptoms due to menopause       FISH OIL PO      Take 2 daily        PROBIOTIC DAILY PO           progesterone 100 MG capsule    PROMETRIUM    90 capsule    Take 1 capsule (100 mg) by mouth daily    Vasomotor symptoms due to menopause       vitamin D 2000 UNITS Caps      Take 2 daily

## 2017-08-17 NOTE — LETTER
8/17/2017       RE: Casie Harris  [address] [place] [address]     Dear Colleague,    Thank you for referring your patient, Casie Harris, to the [facility] EAR NOSE AND THROAT at [facility]. Please see a copy of my visit note below.    Casie Harris is seen in consultation from Dr. Jamil.  She is a 56 year old female being seen for bilateral tinnitus.  She reports that the tinnitus began after her ears were cleaned in April and she was concerned that something might have happened to her ears.  She says the left is more noticeable than the right.  She has not noticed any hearing loss in either ear and there's been no otalgia or otorrhea.  She does not have a history of recurrent ear infections or other ear concerns.  She has seen another ENT and was told there was nothing that could be done about the tinnitus and is here for a 3rd opinion.    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       Family History   Problem Relation Age of Onset     Prostate Cancer Father      HEART DISEASE Father      Cardiovascular Father      AF CHF     Neurologic Disorder Father      trigeminal neuralgia     CANCER Father      BCC     CEREBROVASCULAR DISEASE Sister      Lipids Sister      HEART DISEASE Maternal Grandfather      HEART DISEASE Paternal Grandmother      CEREBROVASCULAR DISEASE Mother      Breast Cancer Paternal Aunt      Suicide Sister      Depression Sister      MENTAL ILLNESS Sister         Social History   Substance Use Topics     Smoking status: Never Smoker     Smokeless tobacco: Never Used     Alcohol use 0.0 oz/week      Comment: wine with dinner       Patient Supplied Answers to Review of Systems   ENT ROS 8/14/2017   Ears, Nose, Throat Ringing/noise in ears   Musculoskeletal Back pain, Neck pain   She is on hormone replacement therapy and wonders if that could be causing the tinnitus.  The remainder of the 10 point review of systems is otherwise negative.    Physical examination:  Constitutional:  In no acute distress, appears stated age  Eyes:  Extraocular movements intact, no spontaneous nystagmus  Ears:  Both ears examined under the microscope.  Ear canals clear, TMs intact with well aerated middle ears.  Respiratory:  No increased work of breathing, wheezing or stridor  Musculoskeletal:  Good upper extremity strength  Skin:  No rashes on the head and neck  Neurologic:  House Brackman 1/6 bilaterally, ambulating normally  Psychiatric:  Alert, normal affect, answering questions appropriately    Audiogram:  Right normal downsloping to mild then rapidly downsloping to 60dB at 8Khz sensorineural hearing loss with 100% speech discrimination, normal tympanogram, intact reflexes.  Left normal downsloping to mild sensorineural hearing loss with 100% speech discrimination, normal tympanogram, intact reflexes.    Outside audiograms were reviewed and those have all shown a slight left hearing asymmetry.    Assessment and plan:  Left worse than right tinnitus.  I reassured her that her ears looked healthy and that I cannot attribute the ear cleaning with the onset of her tinnitus.  We discussed the central etiology of tinnitus.  We also discussed that she likely notices the left tinnitus more because her left hearing is worse than the right.  We discussed evaluation of the asymmetry with a MRI and have ordered that today.  We discussed therapy options for tinnitus including masking and cognitive  behavioral therapy.  She expressed understanding.  We will call her with the MRI results.    Again, thank you for allowing me to participate in the care of your patient.      Sincerely,    Rosangela Best MD

## 2017-08-17 NOTE — PROGRESS NOTES
AUDIOLOGY REPORT    SUMMARY: Audiology visit completed. See audiogram for results.      RECOMMENDATIONS: Follow-up with ENT.    Mehrdad Angel.  Licensed Audiologist  [place] #[id number]

## 2017-09-05 NOTE — PROGRESS NOTES
Casie Harris is seen in consultation from Dr. Jamil.  She is a 56 year old female being seen for bilateral tinnitus.  She reports that the tinnitus began after her ears were cleaned in April and she was concerned that something might have happened to her ears.  She says the left is more noticeable than the right.  She has not noticed any hearing loss in either ear and there's been no otalgia or otorrhea.  She does not have a history of recurrent ear infections or other ear concerns.  She has seen another ENT and was told there was nothing that could be done about the tinnitus and is here for a 3rd opinion.    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       Family History   Problem Relation Age of Onset     Prostate Cancer Father      HEART DISEASE Father      Cardiovascular Father      AF CHF     Neurologic Disorder Father      trigeminal neuralgia     CANCER Father      BCC     CEREBROVASCULAR DISEASE Sister      Lipids Sister      HEART DISEASE Maternal Grandfather      HEART DISEASE Paternal Grandmother      CEREBROVASCULAR DISEASE Mother      Breast Cancer Paternal Aunt      Suicide Sister      Depression Sister      MENTAL ILLNESS Sister        Social History   Substance Use Topics     Smoking status: Never Smoker     Smokeless tobacco: Never Used     Alcohol use 0.0 oz/week      Comment: wine with dinner       Patient Supplied Answers to Review of Systems   ENT ROS 8/14/2017   Ears, Nose, Throat Ringing/noise in ears   Musculoskeletal  Back pain, Neck pain   She is on hormone replacement therapy and wonders if that could be causing the tinnitus.  The remainder of the 10 point review of systems is otherwise negative.    Physical examination:  Constitutional:  In no acute distress, appears stated age  Eyes:  Extraocular movements intact, no spontaneous nystagmus  Ears:  Both ears examined under the microscope.  Ear canals clear, TMs intact with well aerated middle ears.  Respiratory:  No increased work of breathing, wheezing or stridor  Musculoskeletal:  Good upper extremity strength  Skin:  No rashes on the head and neck  Neurologic:  House Brackman 1/6 bilaterally, ambulating normally  Psychiatric:  Alert, normal affect, answering questions appropriately    Audiogram:  Right normal downsloping to mild then rapidly downsloping to 60dB at 8Khz sensorineural hearing loss with 100% speech discrimination, normal tympanogram, intact reflexes.  Left normal downsloping to mild sensorineural hearing loss with 100% speech discrimination, normal tympanogram, intact reflexes.    Outside audiograms were reviewed and those have all shown a slight left hearing asymmetry.    Assessment and plan:  Left worse than right tinnitus.  I reassured her that her ears looked healthy and that I cannot attribute the ear cleaning with the onset of her tinnitus.  We discussed the central etiology of tinnitus.  We also discussed that she likely notices the left tinnitus more because her left hearing is worse than the right.  We discussed evaluation of the asymmetry with a MRI and have ordered that today.  We discussed therapy options for tinnitus including masking and cognitive behavioral therapy.  She expressed understanding.  We will call her with the MRI results.

## 2017-10-10 ENCOUNTER — CARE COORDINATION (OUTPATIENT)
Dept: OTOLARYNGOLOGY | Facility: CLINIC | Age: 57
End: 2017-10-10

## 2017-10-10 NOTE — PROGRESS NOTES
Pt has cancelled and rescheduled MRI at [facility] multiple times. [facility] said that if the MRI is not completed by the end of the month they will cancel the order.  Vee Lewis RN  ENT Care Coordinator   Otology  [phone]  10/10/2017 9:17 AM

## 2017-10-26 ENCOUNTER — TRANSFERRED RECORDS (OUTPATIENT)
Dept: HEALTH INFORMATION MANAGEMENT | Facility: CLINIC | Age: 57
End: 2017-10-26

## 2017-10-30 ENCOUNTER — CARE COORDINATION (OUTPATIENT)
Dept: OTOLARYNGOLOGY | Facility: CLINIC | Age: 57
End: 2017-10-30

## 2017-10-30 NOTE — PROGRESS NOTES
Let her know that there is nothing on the MRI to suggest a cause for her left hearing loss and tinnitus.  Thanks.   Dr. Best has reviewed the MRI done 10-27-17.  Pt notified and understood.  Vee Lewsi RN  ENT Care Coordinator   Otology  [phone]  10/30/2017 11:02 AM

## 2018-03-19 ASSESSMENT — ENCOUNTER SYMPTOMS
HEADACHES: 0
DIZZINESS: 0
HOT FLASHES: 1
MYALGIAS: 1
CONSTIPATION: 0
HEARTBURN: 0
TREMORS: 0
BLOOD IN STOOL: 0
NECK PAIN: 0
JOINT SWELLING: 0
JAUNDICE: 0
HOARSE VOICE: 0
PARALYSIS: 0
DISTURBANCES IN COORDINATION: 0
SINUS PAIN: 0
VOMITING: 0
WEAKNESS: 0
TASTE DISTURBANCE: 0
LOSS OF CONSCIOUSNESS: 0
BLOATING: 1
NAUSEA: 0
TROUBLE SWALLOWING: 0
TINGLING: 1
SKIN CHANGES: 0
NECK MASS: 0
DECREASED LIBIDO: 0
SINUS CONGESTION: 0
POOR WOUND HEALING: 0
NUMBNESS: 0
BACK PAIN: 1
BOWEL INCONTINENCE: 0
SPEECH CHANGE: 0
RECTAL PAIN: 1
DIARRHEA: 0
ABDOMINAL PAIN: 1
MEMORY LOSS: 0
STIFFNESS: 1
NAIL CHANGES: 0
ARTHRALGIAS: 1
MUSCLE CRAMPS: 0
SEIZURES: 0
SMELL DISTURBANCE: 0
MUSCLE WEAKNESS: 0
SORE THROAT: 0

## 2018-03-19 ASSESSMENT — ANXIETY QUESTIONNAIRES
4. TROUBLE RELAXING: SEVERAL DAYS
GAD7 TOTAL SCORE: 3
2. NOT BEING ABLE TO STOP OR CONTROL WORRYING: NOT AT ALL
3. WORRYING TOO MUCH ABOUT DIFFERENT THINGS: NOT AT ALL
7. FEELING AFRAID AS IF SOMETHING AWFUL MIGHT HAPPEN: NOT AT ALL
5. BEING SO RESTLESS THAT IT IS HARD TO SIT STILL: SEVERAL DAYS
1. FEELING NERVOUS, ANXIOUS, OR ON EDGE: NOT AT ALL
6. BECOMING EASILY ANNOYED OR IRRITABLE: SEVERAL DAYS
GAD7 TOTAL SCORE: 3
7. FEELING AFRAID AS IF SOMETHING AWFUL MIGHT HAPPEN: NOT AT ALL

## 2018-04-02 ENCOUNTER — OFFICE VISIT (OUTPATIENT)
Dept: FAMILY MEDICINE | Facility: CLINIC | Age: 58
End: 2018-04-02
Attending: FAMILY MEDICINE
Payer: COMMERCIAL

## 2018-04-02 ENCOUNTER — MYC MEDICAL ADVICE (OUTPATIENT)
Dept: FAMILY MEDICINE | Facility: CLINIC | Age: 58
End: 2018-04-02

## 2018-04-02 ENCOUNTER — RADIANT APPOINTMENT (OUTPATIENT)
Dept: MAMMOGRAPHY | Facility: CLINIC | Age: 58
End: 2018-04-02
Attending: FAMILY MEDICINE
Payer: COMMERCIAL

## 2018-04-02 VITALS
BODY MASS INDEX: 23.24 KG/M2 | DIASTOLIC BLOOD PRESSURE: 74 MMHG | WEIGHT: 166 LBS | HEIGHT: 71 IN | HEART RATE: 76 BPM | SYSTOLIC BLOOD PRESSURE: 110 MMHG

## 2018-04-02 DIAGNOSIS — Z12.31 VISIT FOR SCREENING MAMMOGRAM: ICD-10-CM

## 2018-04-02 DIAGNOSIS — Z00.00 ROUTINE GENERAL MEDICAL EXAMINATION AT A HEALTH CARE FACILITY: Primary | ICD-10-CM

## 2018-04-02 DIAGNOSIS — Z86.0100 HISTORY OF COLONIC POLYPS: ICD-10-CM

## 2018-04-02 DIAGNOSIS — F41.1 GAD (GENERALIZED ANXIETY DISORDER): ICD-10-CM

## 2018-04-02 DIAGNOSIS — L90.0 LICHEN SCLEROSUS ET ATROPHICUS: ICD-10-CM

## 2018-04-02 PROBLEM — H93.19 TINNITUS: Status: ACTIVE | Noted: 2017-04-10

## 2018-04-02 PROBLEM — B00.9 HSV-1 INFECTION: Status: ACTIVE | Noted: 2018-02-26

## 2018-04-02 PROCEDURE — 77067 SCR MAMMO BI INCL CAD: CPT

## 2018-04-02 PROCEDURE — G0463 HOSPITAL OUTPT CLINIC VISIT: HCPCS | Mod: ZF

## 2018-04-02 RX ORDER — CLONAZEPAM 0.5 MG/1
0.5 TABLET ORAL 2 TIMES DAILY PRN
Qty: 10 TABLET | Refills: 0 | Status: SHIPPED | OUTPATIENT
Start: 2018-04-02 | End: 2018-08-06

## 2018-04-02 RX ORDER — MOMETASONE FUROATE 1 MG/G
CREAM TOPICAL
COMMUNITY
Start: 2018-02-22 | End: 2019-05-22

## 2018-04-02 ASSESSMENT — ANXIETY QUESTIONNAIRES
2. NOT BEING ABLE TO STOP OR CONTROL WORRYING: SEVERAL DAYS
5. BEING SO RESTLESS THAT IT IS HARD TO SIT STILL: NOT AT ALL
6. BECOMING EASILY ANNOYED OR IRRITABLE: SEVERAL DAYS
7. FEELING AFRAID AS IF SOMETHING AWFUL MIGHT HAPPEN: SEVERAL DAYS
GAD7 TOTAL SCORE: 6
1. FEELING NERVOUS, ANXIOUS, OR ON EDGE: SEVERAL DAYS
3. WORRYING TOO MUCH ABOUT DIFFERENT THINGS: SEVERAL DAYS

## 2018-04-02 ASSESSMENT — PAIN SCALES - GENERAL: PAINLEVEL: NO PAIN (0)

## 2018-04-02 ASSESSMENT — PATIENT HEALTH QUESTIONNAIRE - PHQ9: 5. POOR APPETITE OR OVEREATING: SEVERAL DAYS

## 2018-04-02 NOTE — LETTER
Date:April 3, 2018      Patient was self referred, no letter generated. Do not send.        [facility] Health Information

## 2018-04-02 NOTE — MR AVS SNAPSHOT
After Visit Summary   4/2/2018    Casie Harris    MRN: [id number]           Patient Information     Date Of Birth          1960        Visit Information        Provider Department      4/2/2018 1:30 PM Sherice Jamil MD PhD [facility]        Today's Diagnoses     Routine general medical examination at a health care facility    -  1    History of colonic polyps        Lichen sclerosus et atrophicus        BECCA (generalized anxiety disorder)          Care Instructions    Colonoscopy due now - referral put in    Pelvic floor referral pu in, call [facility] or stop at  on your way out to schedule appointment    PAP due 12/2018      Nutritious diet  Eat 1200 mg calcium total every day.  Many people achieve this by a diet containing calcium (milk, yogurt, cheese, and other dairy products, supplemented food) and 1 calcium pill. If you don't eat dairy, you should take a calcium supplement 2 times a day.  Eat 1000 IU of vitamin D on daily basis.    Eat a variety of fruits and vegetables and eat whole grains.  Try to choose foods with a high NuVal score, if your grocery store shows this number. High numbers, like 80 or 90, are nutritious foods, and low scores, like 10 are less nutritious foods.          Men should drink no more than 2 alcoholic beverages daily on average; women should drink no more than 1 alcoholic beverage daily on average.    Everyone should avoid all tobacco and nicotine-containing products.    Exercise: Aim for:  Moderate activity (where you can still talk while doing it, such as walking about 100 steps per minute, or 3,000 steps in 30 minutes) for 30 minutes at least 5 days a week  Or  Vigorous exercise (you are working so hard you are breathing hard and fast and your heart rate has gone up, such as playing basketball or soccer) at least 75 minutes weekly    If you have trouble doing 30 minutes of activity, all at once, try small bursts of  activity. Go to www.[employer] for suggestions on how you can do this at home or work.     All adults should try to do muscle strengthening exercise at least 2 days a week    Safety  Always wear bike/motorcycle helmet and seatbelt in a vehicle  Make sure your home has smoke and carbon monoxide detectors.  Wear broad spectrum sunscreen of at least SPF 15 on sun-exposed skin.    Preventing disease  See your dentist at least once a year  Have your eyes checked every 1-2 years.  Get a flu shot each year.              Follow-ups after your visit        Additional Services     GASTROENTEROLOGY ADULT REF PROCEDURE ONLY       Last Lab Result: Creatinine (mg/dL)       Date                     Value                 03/07/2016               0.80             ----------  Body mass index is 23.15 kg/(m^2).     Needed:  No  Language:  English    Patient will be contacted to schedule procedure.     Please be aware that coverage of these services is subject to the terms and limitations of your health insurance plan.  Call member services at your health plan with any benefit or coverage questions.  Any procedures must be performed at a [place] facility OR coordinated by your clinic's referral office.    Please bring the following with you to your appointment:    (1) Any X-Rays, CTs or MRIs which have been performed.  Contact the facility where they were done to arrange for  prior to your scheduled appointment.    (2) List of current medications   (3) This referral request   (4) Any documents/labs given to you for this referral            OB/GYN REFERRAL       Your provider has referred you to:  [facility] - Dr. Daly    Please be aware that coverage of these services is subject to the terms and limitations of your health insurance plan.  Call member services at your health plan with any benefit or coverage questions.      Please bring the following with you to your appointment:    (1) Any X-Rays,  "CTs or MRIs which have been performed.  Contact the facility where they were done to arrange for  prior to your scheduled appointment.   (2) List of current medications   (3) This referral request   (4) Any documents/labs given to you for this referral                  Follow-up notes from your care team     Return in about 1 year (around 4/2/2019).      Who to contact     Please call your clinic at [phone] to:    Ask questions about your health    Make or cancel appointments    Discuss your medicines    Learn about your test results    Speak to your doctor            Additional Information About Your Visit        [employer] Information     [employer] gives you secure access to your electronic health record. If you see a primary care provider, you can also send messages to your care team and make appointments. If you have questions, please call your primary care clinic.  If you do not have a primary care provider, please call [phone] and they will assist you.      [employer] is an electronic gateway that provides easy, online access to your medical records. With [employer], you can request a clinic appointment, read your test results, renew a prescription or communicate with your care team.     To access your existing account, please contact your [facility] or call [phone] for assistance.        Care EveryWhere ID     This is your Care EveryWhere ID. This could be used by other organizations to access your [facility] medical records  [id number]        Your Vitals Were     Pulse Height Last Period BMI (Body Mass Index)          76 1.803 m (5' 11\") 03/25/2012 23.15 kg/m2         Blood Pressure from Last 3 Encounters:   04/02/18 110/74   07/24/17 113/70   04/07/17 104/69    Weight from Last 3 Encounters:   04/02/18 75.3 kg (166 lb)   08/17/17 72.1 kg (159 lb)   07/24/17 73.2 kg (161 lb 4.8 oz)              We Performed the Following     GASTROENTEROLOGY ADULT REF PROCEDURE ONLY  "    OB/GYN REFERRAL          Today's Medication Changes          These changes are accurate as of 4/2/18 11:59 PM.  If you have any questions, ask your nurse or doctor.               Start taking these medicines.        Dose/Directions    clonazePAM 0.5 MG tablet   Commonly known as:  klonoPIN   Used for:  BECCA (generalized anxiety disorder)   Started by:  Sherice Jamil MD PhD        Dose:  0.5 mg   Take 1 tablet (0.5 mg) by mouth 2 times daily as needed for anxiety   Quantity:  10 tablet   Refills:  0            Where to get your medicines      Some of these will need a paper prescription and others can be bought over the counter.  Ask your nurse if you have questions.     Bring a paper prescription for each of these medications     clonazePAM 0.5 MG tablet                Primary Care Provider Office Phone # Fax #    Sherice Jamil MD PhD [phone]       [address] [place] [address]        Equal Access to Services     [facility]: Hadii sara goel hadasho Soomaali, waaxda luqadaha, qaybta kaalmada adeegyada, jaswinder cortesin haysabina vora . So [facility] [phone].    ATENCIÓN: Si habla español, tiene a paez disposición servicios gratuitos de asistencia lingüística. Michelle al [phone].    We comply with applicable federal civil rights laws and [place] laws. We do not discriminate on the basis of race, color, national origin, age, disability, sex, sexual orientation, or gender identity.            Thank you!     Thank you for choosing [facility]  for your care. Our goal is always to provide you with excellent care. Hearing back from our patients is one way we can continue to improve our services. Please take a few minutes to complete the written survey that you may receive in the mail after your visit with us. Thank you!             Your Updated Medication List - Protect others around you: Learn how to safely use, store and throw away your  medicines at www.disposemymeds.org.          This list is accurate as of 4/2/18 11:59 PM.  Always use your most recent med list.                   Brand Name Dispense Instructions for use Diagnosis    Calcium-Magnesium 250-125 MG Tabs      (Chewables) take 4 daily        clonazePAM 0.5 MG tablet    klonoPIN    10 tablet    Take 1 tablet (0.5 mg) by mouth 2 times daily as needed for anxiety    BECCA (generalized anxiety disorder)       estradiol 0.0375 MG/24HR BIW patch    VIVELLE-DOT    24 patch    Place 1 patch onto the skin twice a week    Vasomotor symptoms due to menopause       mometasone 0.1 % cream    ELOCON          PROBIOTIC DAILY PO      Take by mouth as needed        progesterone 100 MG capsule    PROMETRIUM    90 capsule    Take 1 capsule (100 mg) by mouth daily    Vasomotor symptoms due to menopause       vitamin D 2000 UNITS Caps      Take 2 daily

## 2018-04-02 NOTE — PROGRESS NOTES
REASON for VISIT: annual exam     [facility]   Casie Harris is a 57 year old female who is here for a preventive examination.      Concerns today include back and pelvic discomfort which she describes as deep itching and tingling.  All around lower lumbar back and into pelvis.  Discomfort is not midline back.  Non-radiating.  Present since around September 2017.  Went to chiropractor, but she feels that made it worse.  Acupuncture seems to help.  Heat and acetaminophen have not helped significantly    She is PM Gr0P0 who is on Vivelle and prometrium for 2 yrs.  The hormones have helped considerably with anxiety, but over the last 3 weeks the hot flashes have returned.  She has noted more swelling/bloating and difficulty losing weight.  Breasts also feel swollen.  She wants to discuss duration of HRT. She sees a gyn MD for her pelvic and had this done I 12/2017.  Hx of abn pap     She also has lichen sclerosis and uses a steroid cream for this.     Patient is due for repeat colonoscopoy.  She had 4 polyps removed 3/2014.  Recommendation was to have a repeat colonoscopy in 3-6 years    Patient had a mammogram performed today, results are pending    Last pap 12/2015 at [facility], had recent pelvic 12/2017    Hepatitis C screening was done previously by pt report and was negative.          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     Arthritis      Autoimmune disease (H) 1987    Chronic Fatigue Syndrome     Colon polyps 2014     Generalised anxiety disorder      Hearing problem 2012, 2017     Lichen sclerosus      Sensorineural hearing loss 2012, 2017     Tinnitus April 2017         Current Outpatient Prescriptions   Medication Sig Dispense Refill     mometasone (ELOCON) 0.1 % cream        clonazePAM (KLONOPIN) 0.5 MG tablet Take 1 tablet (0.5 mg) by mouth 2 times daily as needed for anxiety 10 tablet 0     estradiol (VIVELLE-DOT) 0.0375 MG/24HR BIW patch Place 1  patch onto the skin twice a week 24 patch 3     progesterone (PROMETRIUM) 100 MG capsule Take 1 capsule (100 mg) by mouth daily 90 capsule 3     Probiotic Product (PROBIOTIC DAILY PO) Take by mouth as needed        Calcium-Magnesium 250-125 MG TABS (Chewables) take 4 daily       Cholecalciferol (VITAMIN D) 2000 UNIT CAPS Take 2 daily         Social History   Substance Use Topics     Smoking status: Never Smoker     Smokeless tobacco: Never Used     Alcohol use 0.0 oz/week      Comment: daily glass of wine       Family History   Problem Relation Age of Onset     Prostate Cancer Father      HEART DISEASE Father      Cardiovascular Father      AF CHF     Neurologic Disorder Father      trigeminal neuralgia     CANCER Father      BCC     CEREBROVASCULAR DISEASE Sister      Lipids Sister      HEART DISEASE Maternal Grandfather      HEART DISEASE Paternal Grandmother      CEREBROVASCULAR DISEASE Mother      Breast Cancer Paternal Aunt      Suicide Sister      Depression Sister      MENTAL ILLNESS Sister      Breast Cancer Other           Review Of Systems  Answers for HPI/ROS submitted by the patient on 3/19/2018   BECCA 7 TOTAL SCORE: 3  PHQ-2 Score: 0  General Symptoms: No  Skin Symptoms: Yes  HENT Symptoms: Yes  EYE SYMPTOMS: No  HEART SYMPTOMS: No  LUNG SYMPTOMS: No  INTESTINAL SYMPTOMS: Yes  URINARY SYMPTOMS: No  GYNECOLOGIC SYMPTOMS: Yes  BREAST SYMPTOMS: No  SKELETAL SYMPTOMS: Yes  BLOOD SYMPTOMS: No  NERVOUS SYSTEM SYMPTOMS: Yes  MENTAL HEALTH SYMPTOMS: No  Changes in hair: No  Changes in moles/birth marks: No  Itching: Yes  Rashes: No  Changes in nails: No  Acne: No  Hair in places you don't want it: No  Change in facial hair: No  Warts: No  Non-healing sores: No  Scarring: No  Flaking of skin: No  Color changes of hands/feet in cold : No  Sun sensitivity: No  Skin thickening: Yes  Ear pain: No  Ear discharge: No  Hearing loss: Yes  Tinnitus: Yes  Nosebleeds: No  Congestion: No  Sinus pain: No  Trouble swallowing:  "No   Voice hoarseness: No  Mouth sores: No  Sore throat: No  Tooth pain: No  Gum tenderness: No  Bleeding gums: No  Change in taste: No  Change in sense of smell: No  Dry mouth: No  Hearing aid used: No  Neck lump: No  Heart burn or indigestion: No  Nausea: No  Vomiting: No  Abdominal pain: Yes  Bloating: Yes  Constipation: No  Diarrhea: No  Blood in stool: No  Black stools: No  Rectal or Anal pain: Yes  Fecal incontinence: No  Yellowing of skin or eyes: No  Vomit with blood: No  Change in stools: No  Back pain: Yes  Muscle aches: Yes  Neck pain: No  Swollen joints: No  Joint pain: Yes  Bone pain: Yes  Muscle cramps: No  Muscle weakness: No  Joint stiffness: Yes  Bone fracture: No  Trouble with coordination: No  Dizziness or trouble with balance: No  Fainting or black-out spells: No  Memory loss: No  Headache: No  Seizures: No  Speech problems: No  Tingling: Yes  Tremor: No  Weakness: No  Difficulty walking: No  Paralysis: No  Numbness: No  Bleeding or spotting between periods: No  Heavy or painful periods: No  Irregular periods: No  Vaginal discharge: No  Hot flashes: Yes  Vaginal dryness: No  Genital ulcers: Yes  Reduced libido: No  Painful intercourse: No  Difficulty with sexual arousal: No  Post-menopausal bleeding: No    OBJECTIVE:  /74  Pulse 76  Ht 1.803 m (5' 11\")  Wt 75.3 kg (166 lb)  LMP 03/25/2012  BMI 23.15 kg/m2  Gen:  woman in NAD  HEENT: PERRL fundi normal  Ears clear with good light reflex.  OP MMM no lesions.  No drainage  Neck  Supple.  Thyroid normal, prominent hyoid bone on the left.   No carotid bruits.  No LAD  CVS exam: S1, S2 normal, no murmur, click, rub or gallop, regular rate and rhythm, chest is clear without rales or wheezing, no pedal edema, no JVD, no hepatosplenomegaly.  Abd Soft ND NT  BS present  No masses or HSM  Ext   Good pulses. No edema  Musculoskeletal: spine is straight, extremities full ROM, no deformity  BREAST:  normal without suspicious masses, skin changes or " "axillary nodes, symmetric fibrous changes in both upper outer quadrants   Pelvic exam: deferred.  Neuro: Neurological exam reveals normal without focal findings, mental status, speech normal, alert and oriented x iii, RENETTA, reflexes normal and symmetric.    PHQ9 - 9 score = 0  BECCA - 7 score = 6    ASSESSMENT: Casie is a 57-year-old postmenopausal female currently on hormone replacement therapy here for annual exam; concerns today include pelvic discomfort/tingling and duration of hormone replacement therapy      PLAN:  1. Routine general medical examination at a health care facility  Mammogram done today.  Results pending.  Recommend annual mammogram    Pap is up-to-date.  Patient currently getting pelvic exams and Paps at [facility].  Discussed that it may be beneficial for single provider to manage her postmenopausal well woman care as well as hormone replacement therapy.  Patient has a history of abnormal Paps.  Last Pap done 12/2015; Pap 2/12/2018    Discussed ongoing hormone therapy.  Patient would likely not benefit from coming off hormone replacement therapy currently as her symptoms are mostly suggestive of estrogen withdrawal (i.e. hot flashes).  Discussed the duration of hormone therapy generally should not exceed 3-5 years but there is not imminent need to come off HRT at the present time.  Patient happy with this plan and will continue hormone replacement therapy at current dosages.    2. History of colonic polyps  Patient is due for colonoscopy.  Last colonoscopy was 2014 which revealed 4 polyps, 3 of which were nondysplastic adenomas.  Recommendation for repeat colonoscopy in 3-6 years.  Referral for colonoscopy given  - GASTROENTEROLOGY ADULT REF PROCEDURE ONLY    3. Lichen sclerosus et atrophicus  Continue topical steroids - f/u with gyn      4.pelvic symptoms   Patient symptoms including pelvic tingling and \"deep itch\" as well as low back discomfort (not pain) are all very nonspecific.  Does not " seem to be related to patient's recently diagnosed HSV-1 genital infection  Per the patient.  Unclear exactly what the cause is, however, there may be some irritation of pelvic floor nerves.  Patient would like to see how she does for now, but referral for W at bedtime pelvic floor clinic with Dr. Linda.  - OB/GYN REFERRAL    5. BECCA (generalized anxiety disorder)  Patient requested medication for eating with her anxiety while flying.  Becca 7 score today was 6.  - clonazePAM (KLONOPIN) 0.5 MG tablet; Take 1 tablet (0.5 mg) by mouth 2 times daily as needed for anxiety  Dispense: 10 tablet; Refill: 0          Dimas Aleman MD  PGY-2 Resident  [facility]  [phone]       I saw the patient with the resident and agree with the findings and the plan of care as documented in the resident's note.  I personally reviewed history and exam findings.  Key findings  Prominent left hyoid bone.     Patient did note on diagnoses listed from yesterday that PFD was not a diagnosis and I agree - she was having pelvic symptoms.  Also patient felt MDD was a diagnosis in error - this was on her problem list and  With  No previous history I removed this.  And her PHQ9=0.    Sherice Jamil MD, PhD            Sherice Jamil MD, PhD

## 2018-04-02 NOTE — LETTER
4/2/2018       RE: Casie Harris  [address] [place] [address]     Dear Colleague,    Thank you for referring your patient, Casie Harris, to the [facility] at [facility]. Please see a copy of my visit note below.    REASON for VISIT: annual exam     HPI   Casie Harris is a 57 year old female who is here for a preventive examination.      Concerns today include back and pelvic discomfort which she describes as deep itching and tingling.  All around lower lumbar back and into pelvis.  Discomfort is not midline back.  Non-radiating.  Present since around September 2017.  Went to chiropractor, but she feels that made it worse.  Acupuncture seems to help.  Heat and acetaminophen have not helped significantly    She is PM Gr0P0 who is on Vivelle and prometrium for 2 yrs.  The hormones have helped considerably with anxiety, but over the last 3 weeks the hot flashes have returned.  She has noted more swelling/bloating and difficulty losing weight.  Breasts also feel swollen.  She wants to discuss duration of HRT. She sees a gyn MD for her pelvic and had this done I 12/2017.  Hx of abn pap     She also has lichen sclerosis and uses a steroid cream for this.     Patient is due for repeat colonoscopoy.  She had 4 polyps removed 3/2014.  Recommendation was to have a repeat colonoscopy in 3-6 years    Patient had a mammogram performed today, results are pending    Last pap 12/2015 at [facility], had recent pelvic 12/2017    Hepatitis C screening was done previously by pt report and was negative.          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     Arthritis      Autoimmune disease (H) 1987    Chronic Fatigue Syndrome     Colon polyps 2014     Generalised anxiety disorder      Hearing problem 2012, 2017     Lichen sclerosus      Sensorineural hearing loss 2012, 2017     Tinnitus April  2017         Current Outpatient Prescriptions   Medication Sig Dispense Refill     mometasone (ELOCON) 0.1 % cream        clonazePAM (KLONOPIN) 0.5 MG tablet Take 1 tablet (0.5 mg) by mouth 2 times daily as needed for anxiety 10 tablet 0     estradiol (VIVELLE-DOT) 0.0375 MG/24HR BIW patch Place 1 patch onto the skin twice a week 24 patch 3     progesterone (PROMETRIUM) 100 MG capsule Take 1 capsule (100 mg) by mouth daily 90 capsule 3     Probiotic Product (PROBIOTIC DAILY PO) Take by mouth as needed        Calcium-Magnesium 250-125 MG TABS (Chewables) take 4 daily       Cholecalciferol (VITAMIN D) 2000 UNIT CAPS Take 2 daily         Social History   Substance Use Topics     Smoking status: Never Smoker     Smokeless tobacco: Never Used     Alcohol use 0.0 oz/week      Comment: daily glass of wine       Family History   Problem Relation Age of Onset     Prostate Cancer Father      HEART DISEASE Father      Cardiovascular Father      AF CHF     Neurologic Disorder Father      trigeminal neuralgia     CANCER Father      BCC     CEREBROVASCULAR DISEASE Sister      Lipids Sister      HEART DISEASE Maternal Grandfather      HEART DISEASE Paternal Grandmother      CEREBROVASCULAR DISEASE Mother      Breast Cancer Paternal Aunt      Suicide Sister      Depression Sister      MENTAL ILLNESS Sister      Breast Cancer Other           Review Of Systems  Answers for HPI/ROS submitted by the patient on 3/19/2018   BECCA 7 TOTAL SCORE: 3  PHQ-2 Score: 0  General Symptoms: No  Skin Symptoms: Yes  HENT Symptoms: Yes  EYE SYMPTOMS: No  HEART SYMPTOMS: No  LUNG SYMPTOMS: No  INTESTINAL SYMPTOMS: Yes  URINARY SYMPTOMS: No  GYNECOLOGIC SYMPTOMS: Yes  BREAST SYMPTOMS: No  SKELETAL SYMPTOMS: Yes  BLOOD SYMPTOMS: No  NERVOUS SYSTEM SYMPTOMS: Yes  MENTAL HEALTH SYMPTOMS: No  Changes in hair: No  Changes in moles/birth marks: No  Itching: Yes  Rashes: No  Changes in nails: No  Acne: No  Hair in places you don't want it: No  Change in facial  "hair: No  Warts: No  Non-healing sores: No  Scarring: No  Flaking of skin: No  Color changes of hands/feet in cold : No  Sun sensitivity: No  Skin thickening: Yes  Ear pain: No  Ear discharge: No  Hearing loss: Yes  Tinnitus: Yes  Nosebleeds: No  Congestion: No  Sinus pain: No  Trouble swallowing: No   Voice hoarseness: No  Mouth sores: No  Sore throat: No  Tooth pain: No  Gum tenderness: No  Bleeding gums: No  Change in taste: No  Change in sense of smell: No  Dry mouth: No  Hearing aid used: No  Neck lump: No  Heart burn or indigestion: No  Nausea: No  Vomiting: No  Abdominal pain: Yes  Bloating: Yes  Constipation: No  Diarrhea: No  Blood in stool: No  Black stools: No  Rectal or Anal pain: Yes  Fecal incontinence: No  Yellowing of skin or eyes: No  Vomit with blood: No  Change in stools: No  Back pain: Yes  Muscle aches: Yes  Neck pain: No  Swollen joints: No  Joint pain: Yes  Bone pain: Yes  Muscle cramps: No  Muscle weakness: No  Joint stiffness: Yes  Bone fracture: No  Trouble with coordination: No  Dizziness or trouble with balance: No  Fainting or black-out spells: No  Memory loss: No  Headache: No  Seizures: No  Speech problems: No  Tingling: Yes  Tremor: No  Weakness: No  Difficulty walking: No  Paralysis: No  Numbness: No  Bleeding or spotting between periods: No  Heavy or painful periods: No  Irregular periods: No  Vaginal discharge: No  Hot flashes: Yes  Vaginal dryness: No  Genital ulcers: Yes  Reduced libido: No  Painful intercourse: No  Difficulty with sexual arousal: No  Post-menopausal bleeding: No    OBJECTIVE:  /74  Pulse 76  Ht 1.803 m (5' 11\")  Wt 75.3 kg (166 lb)  LMP 03/25/2012  BMI 23.15 kg/m2  Gen:  woman in NAD  HEENT: PERRL fundi normal  Ears clear with good light reflex.  OP MMM no lesions.  No drainage  Neck  Supple.  Thyroid normal, prominent hyoid bone on the left.   No carotid bruits.  No LAD  CVS exam: S1, S2 normal, no murmur, click, rub or gallop, regular rate and " rhythm, chest is clear without rales or wheezing, no pedal edema, no JVD, no hepatosplenomegaly.  Abd Soft ND NT  BS present  No masses or HSM  Ext   Good pulses. No edema  Musculoskeletal: spine is straight, extremities full ROM, no deformity  BREAST:  normal without suspicious masses, skin changes or axillary nodes, symmetric fibrous changes in both upper outer quadrants   Pelvic exam: deferred.  Neuro: Neurological exam reveals normal without focal findings, mental status, speech normal, alert and oriented x iii, RENETTA, reflexes normal and symmetric.    PHQ9 - 9 score = 0  BECCA - 7 score = 6    ASSESSMENT: Casie is a 57-year-old postmenopausal female currently on hormone replacement therapy here for annual exam; concerns today include pelvic discomfort/tingling and duration of hormone replacement therapy      PLAN:  1. Routine general medical examination at a health care facility  Mammogram done today.  Results pending.  Recommend annual mammogram    Pap is up-to-date.  Patient currently getting pelvic exams and Paps at [facility].  Discussed that it may be beneficial for single provider to manage her postmenopausal well woman care as well as hormone replacement therapy.  Patient has a history of abnormal Paps.  Last Pap done 12/2015; Pap 2/12/2018    Discussed ongoing hormone therapy.  Patient would likely not benefit from coming off hormone replacement therapy currently as her symptoms are mostly suggestive of estrogen withdrawal (i.e. hot flashes).  Discussed the duration of hormone therapy generally should not exceed 3-5 years but there is not imminent need to come off HRT at the present time.  Patient happy with this plan and will continue hormone replacement therapy at current dosages.    2. History of colonic polyps  Patient is due for colonoscopy.  Last colonoscopy was 2014 which revealed 4 polyps, 3 of which were nondysplastic adenomas.  Recommendation for repeat colonoscopy in 3-6 years.  Referral for  "colonoscopy given  - GASTROENTEROLOGY ADULT REF PROCEDURE ONLY    3. Lichen sclerosus et atrophicus  Continue topical steroids - f/u with gyn      4.pelvic symptoms   Patient symptoms including pelvic tingling and \"deep itch\" as well as low back discomfort (not pain) are all very nonspecific.  Does not seem to be related to patient's recently diagnosed HSV-1 genital infection  Per the patient.  Unclear exactly what the cause is, however, there may be some irritation of pelvic floor nerves.  Patient would like to see how she does for now, but referral for W at bedtime pelvic floor clinic with Dr. Linda.  - OB/GYN REFERRAL    5. BECCA (generalized anxiety disorder)  Patient requested medication for eating with her anxiety while flying.  Becca 7 score today was 6.  - clonazePAM (KLONOPIN) 0.5 MG tablet; Take 1 tablet (0.5 mg) by mouth 2 times daily as needed for anxiety  Dispense: 10 tablet; Refill: 0          Dimas Aleman MD  PGY-2 Resident  [facility]  [phone]       I saw the patient with the resident and agree with the findings and the plan of care as documented in the resident's note.  I personally reviewed history and exam findings.  Key findings  Prominent left hyoid bone.     Patient did note on diagnoses listed from yesterday that PFD was not a diagnosis and I agree - she was having pelvic symptoms.  Also patient felt MDD was a diagnosis in error - this was on her problem list and  With  No previous history I removed this.  And her PHQ9=0.    Sherice Jamil MD, PhD            Sherice Jamil MD, PhD        Again, thank you for allowing me to participate in the care of your patient.      Sincerely,    Sherice Jamil MD PhD      "

## 2018-04-02 NOTE — PATIENT INSTRUCTIONS
Colonoscopy due now - referral put in    Pelvic floor referral pu in, call [facility] or stop at  on your way out to schedule appointment    PAP due 12/2018      Nutritious diet  Eat 1200 mg calcium total every day.  Many people achieve this by a diet containing calcium (milk, yogurt, cheese, and other dairy products, supplemented food) and 1 calcium pill. If you don't eat dairy, you should take a calcium supplement 2 times a day.  Eat 1000 IU of vitamin D on daily basis.    Eat a variety of fruits and vegetables and eat whole grains.  Try to choose foods with a high NuVal score, if your grocery store shows this number. High numbers, like 80 or 90, are nutritious foods, and low scores, like 10 are less nutritious foods.          Men should drink no more than 2 alcoholic beverages daily on average; women should drink no more than 1 alcoholic beverage daily on average.    Everyone should avoid all tobacco and nicotine-containing products.    Exercise: Aim for:  Moderate activity (where you can still talk while doing it, such as walking about 100 steps per minute, or 3,000 steps in 30 minutes) for 30 minutes at least 5 days a week  Or  Vigorous exercise (you are working so hard you are breathing hard and fast and your heart rate has gone up, such as playing basketball or soccer) at least 75 minutes weekly    If you have trouble doing 30 minutes of activity, all at once, try small bursts of activity. Go to www.Baboom.Profilepasser for suggestions on how you can do this at home or work.     All adults should try to do muscle strengthening exercise at least 2 days a week    Safety  Always wear bike/motorcycle helmet and seatbelt in a vehicle  Make sure your home has smoke and carbon monoxide detectors.  Wear broad spectrum sunscreen of at least SPF 15 on sun-exposed skin.    Preventing disease  See your dentist at least once a year  Have your eyes checked every 1-2 years.  Get a flu shot each year.

## 2018-04-03 ENCOUNTER — TELEPHONE (OUTPATIENT)
Dept: GASTROENTEROLOGY | Facility: CLINIC | Age: 58
End: 2018-04-03

## 2018-04-04 ENCOUNTER — TELEPHONE (OUTPATIENT)
Dept: GASTROENTEROLOGY | Facility: CLINIC | Age: 58
End: 2018-04-04

## 2018-04-10 DIAGNOSIS — G89.29 CHRONIC BILATERAL LOW BACK PAIN WITHOUT SCIATICA: Primary | ICD-10-CM

## 2018-04-10 DIAGNOSIS — M54.50 CHRONIC BILATERAL LOW BACK PAIN WITHOUT SCIATICA: Primary | ICD-10-CM

## 2018-04-13 ENCOUNTER — THERAPY VISIT (OUTPATIENT)
Dept: PHYSICAL THERAPY | Facility: CLINIC | Age: 58
End: 2018-04-13
Payer: COMMERCIAL

## 2018-04-13 DIAGNOSIS — M54.50 LUMBAGO: Primary | ICD-10-CM

## 2018-04-13 PROCEDURE — 97110 THERAPEUTIC EXERCISES: CPT | Mod: GP | Performed by: PHYSICAL THERAPIST

## 2018-04-13 PROCEDURE — 97161 PT EVAL LOW COMPLEX 20 MIN: CPT | Mod: GP | Performed by: PHYSICAL THERAPIST

## 2018-04-13 NOTE — LETTER
[place] [facility] PHYSICAL [facility]  [address]  [facility] [address] [phone]    2018    Re: Casie Harris   :   1960  MRN:  [id number]   REFERRING PHYSICIAN:   Sherice Jamil    [facility] ATHLETIC [facility] PHYSICAL [facility]    Date of Initial Evaluation:  2018  Visits:  Rxs Used: 1  Reason for Referral:  Lumbago    EVALUATION SUMMARY    Subjective:  Patient is a 57 year old female presenting with rehab back hpi. The history is provided by the patient. No  was used.   Casie Harris is a 57 year old female with a lumbar condition.  Condition occurred with:  Insidious onset.  Condition occurred: for unknown reasons.  This is a chronic and recurrent condition  Pt reports she has a long hx of LBP over the yrs that would come and go. In 2017 she began to have pain again and thinks it may be related to garden work. This episode has continued and she saw her MD on 4-10-18 received PT referral. In the past she felt like she could work things out. Pt XC skis in the winter walks every day 1-2x a day 30-40 min total. She also does yoga but not much. Currently bending forward and sitting increases her sx. Her sx are in the LB L>R and into the L buttock and will radiate out to perineal area.    Patient reports pain:  Lumbar spine left and central lumbar spine.  Radiates to:  Gluteals left (perineal area).  Pain is described as aching and is constant and reported as 3/10.  Associated symptoms:  Tingling and numbness. Pain is worse during the day and worse in the A.M..  Symptoms are exacerbated by sitting and bending and relieved by activity/movement.  Since onset symptoms are unchanged.   General health as reported by patient is good.  Pertinent medical history includes:  Menopausal.  Medical allergies: no.  Other surgeries include:  None reported.  Current medications:  Hormone replacement therapy.   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  Primary job tasks include:  Prolonged sitting and other (computer work).  Barriers include:  None as reported by patient.  Red flags:  Pain at rest/night (numbness/tingling).  Oswestry Score: 18 %                                             Objective:  Standing Alignment:    Lumbar:  Lordosis decr       Lumbar/SI Evaluation  ROM:    AROM Lumbar:   Flexion:          Hands to shin LBP  Ext:                    Limited iinceases tingling     Re: Casie Harris   :   1960    Side Bend:        Left:  Slight pain    Right:  Slight pain  Rotation:           Left:     Right:   Side Glide:        Left:     Right:     Lumbar Myotomes:  normal (L hip flexors and ER's are slighty weaker 5-/5)  Lumbar DTR's:  normal  Lumbar Dermtomes:  normal  Neural Tension/Mobility:  Lumbar:  Normal    Lumbar Palpation:  Palpation (lumbar): slight discomfort with PA's over L1-5.  Functional Tests:  not assessed  Lumbar Provocation:  normal  Spinal Segmental Conclusions: Possible disc derangement L5 testing extensions     Assessment/Plan:    Patient is a 57 year old female with lumbar complaints.    Patient has the following significant findings with corresponding treatment plan.                Diagnosis 1:  LBP  Pain -  hot/cold therapy, electric stimulation and manual therapy  Decreased ROM/flexibility - manual therapy and therapeutic exercise  Decreased strength - therapeutic exercise and therapeutic activities  Impaired muscle performance - neuro re-education  Decreased function - therapeutic activities  Impaired posture - neuro re-education    Therapy Evaluation Codes:   1) History comprised of:   Personal factors that impact the plan of care:      None.    Comorbidity factors that impact the plan of care are:      None.     Medications impacting care: None.  2) Examination of Body Systems comprised of:   Body structures and functions that  impact the plan of care:      Lumbar spine.   Activity limitations that impact the plan of care are:      Bending.  3) Clinical presentation characteristics are:   Stable/Uncomplicated.  4) Decision-Making    Low complexity using standardized patient assessment instrument and/or measureable assessment of functional outcome.  Cumulative Therapy Evaluation is: Low complexity.    Previous and current functional limitations:  (See Goal Flow Sheet for this information)    Short term and Long term goals: (See Goal Flow Sheet for this information)     Communication ability:  Patient appears to be able to clearly communicate and understand verbal and written communication and follow directions correctly.  Treatment Explanation - The following has been discussed with the patient:   RX ordered/plan of care  Anticipated outcomes  Re: Casie Harris   :   1960    Possible risks and side effects  This patient would benefit from PT intervention to resume normal activities.   Rehab potential is good.    Frequency:  1 X week, once daily  Duration:  for 6 weeks  Discharge Plan:  Achieve all LTG.  Independent in home treatment program.  Reach maximal therapeutic benefit.    Thank you for your referral.    INQUIRIES  Therapist: Linda Minor, PT   [facility] PHYSICAL THERAPY  [address] [place] [address] [address]  [facility] [address]  Phone: [phone]  Fax: [phone]

## 2018-04-13 NOTE — PROGRESS NOTES
[place] [facility] Initial Evaluation  Subjective:  Patient is a 57 year old female presenting with rehab back hpi. The history is provided by the patient. No  was used.   Casie Harris is a 57 year old female with a lumbar condition.  Condition occurred with:  Insidious onset.  Condition occurred: for unknown reasons.  This is a chronic and recurrent condition  Pt reports she has a long hx of LBP over the yrs that would come and go. In October 2017 she began to have pain again and thinks it may be related to garden work. This episode has continued and she saw her MD on 4-10-18 received PT referral. In the past she felt like she could work things out. Pt XC skis in the winter walks every day 1-2x a day 30-40 min total. She also does yoga but not much. Currently bending forward and sitting increases her sx. Her sx are in the LB L>R and into the L buttock and will radiate out to perineal area.    Patient reports pain:  Lumbar spine left and central lumbar spine.  Radiates to:  Gluteals left (perineal area).  Pain is described as aching and is constant and reported as 3/10.  Associated symptoms:  Tingling and numbness. Pain is worse during the day and worse in the A.M..  Symptoms are exacerbated by sitting and bending and relieved by activity/movement.  Since onset symptoms are unchanged.                                                      Objective:  Standing Alignment:        Lumbar:  Lordosis decr                           Lumbar/SI Evaluation  ROM:    AROM Lumbar:   Flexion:          Hands to shin LBP  Ext:                    Limited iinceases tingling   Side Bend:        Left:  Slight pain    Right:  Slight pain  Rotation:           Left:     Right:   Side Glide:        Left:     Right:           Lumbar Myotomes:  normal (L hip flexors and ER's are slighty weaker 5-/5)            Lumbar DTR's:  normal        Lumbar Dermtomes:  normal                Neural Tension/Mobility:   Lumbar:  Normal        Lumbar Palpation:  Palpation (lumbar): slight discomfort with PA's over L1-5.      Functional Tests:  not assessed        Lumbar Provocation:  normal      Spinal Segmental Conclusions: Possible disc derangement L5 testing extensions                                                        General     ROS    Assessment/Plan:    Patient is a 57 year old female with lumbar complaints.    Patient has the following significant findings with corresponding treatment plan.                Diagnosis 1:  LBP  Pain -  hot/cold therapy, electric stimulation and manual therapy  Decreased ROM/flexibility - manual therapy and therapeutic exercise  Decreased strength - therapeutic exercise and therapeutic activities  Impaired muscle performance - neuro re-education  Decreased function - therapeutic activities  Impaired posture - neuro re-education    Therapy Evaluation Codes:   1) History comprised of:   Personal factors that impact the plan of care:      None.    Comorbidity factors that impact the plan of care are:      None.     Medications impacting care: None.  2) Examination of Body Systems comprised of:   Body structures and functions that impact the plan of care:      Lumbar spine.   Activity limitations that impact the plan of care are:      Bending.  3) Clinical presentation characteristics are:   Stable/Uncomplicated.  4) Decision-Making    Low complexity using standardized patient assessment instrument and/or measureable assessment of functional outcome.  Cumulative Therapy Evaluation is: Low complexity.    Previous and current functional limitations:  (See Goal Flow Sheet for this information)    Short term and Long term goals: (See Goal Flow Sheet for this information)     Communication ability:  Patient appears to be able to clearly communicate and understand verbal and written communication and follow directions correctly.  Treatment Explanation - The following has been discussed with the  patient:   RX ordered/plan of care  Anticipated outcomes  Possible risks and side effects  This patient would benefit from PT intervention to resume normal activities.   Rehab potential is good.    Frequency:  1 X week, once daily  Duration:  for 6 weeks  Discharge Plan:  Achieve all LTG.  Independent in home treatment program.  Reach maximal therapeutic benefit.    Please refer to the daily flowsheet for treatment today, total treatment time and time spent performing 1:1 timed codes.

## 2018-04-13 NOTE — MR AVS SNAPSHOT
After Visit Summary   4/13/2018    Casie Harris    MRN: [id number]           Patient Information     Date Of Birth          1960        Visit Information        Provider Department      4/13/2018 9:40 AM Linda Minor, PT [place] [facility] Physical Therapy        Today's Diagnoses     Lumbago    -  1       Follow-ups after your visit        Your next 10 appointments already scheduled     Apr 25, 2018 10:10 AM CDT   (Arrive by 9:55 AM)   ADIS Spine with Linda Minor PT   [place] [facility] Physical Therapy ([facility]  )    [address]  [facility] [address]   [phone]            May 04, 2018   Procedure with Jaycee Day MD   [facility] ([facility])    [address] [place] [address]  5th Floor  [facility] [id number] [phone]           Located in the Clinics and Surgery Center at [address] [place] [address].   parking is very convenient and highly recommended.  is a $6 flat rate fee.  Both  and self parkers should enter the main arrival plaza from [facility]; parking attendants will direct you based on your parking preference.            May 09, 2018 10:10 AM CDT   (Arrive by 9:55 AM)   ADIS Spine with Linda Minor PT   [place] [facility] Physical Therapy ([facility]  )    [address]  [facility] [address] [phone]              Who to contact     If you have questions or need follow up information about today's clinic visit or your schedule please contact [facility] PH[facility]ICAL THERAPY directly at [phone].  Normal or non-critical lab and imaging results will be communicated to you by MyChart, letter or phone within 4 business days after the clinic has received the results. If you do not hear from us within 7 days, please contact the clinic through LiveRampt  or phone. If you have a critical or abnormal lab result, we will notify you by phone as soon as possible.  Submit refill requests through FiPath or call your pharmacy and they will forward the refill request to us. Please allow 3 business days for your refill to be completed.          Additional Information About Your Visit        AeroFShart Information     FiPath gives you secure access to your electronic health record. If you see a primary care provider, you can also send messages to your care team and make appointments. If you have questions, please call your primary care clinic.  If you do not have a primary care provider, please call [phone] and they will assist you.        Care EveryWhere ID     This is your Care EveryWhere ID. This could be used by other organizations to access your [place] medical records  [id number]        Your Vitals Were     Last Period                   03/25/2012            Blood Pressure from Last 3 Encounters:   04/02/18 110/74   07/24/17 113/70   04/07/17 104/69    Weight from Last 3 Encounters:   04/02/18 75.3 kg (166 lb)   08/17/17 72.1 kg (159 lb)   07/24/17 73.2 kg (161 lb 4.8 oz)              We Performed the Following     HC PT EVAL, LOW COMPLEXITY     ADIS INITIAL EVAL REPORT     THERAPEUTIC EXERCISES        Primary Care Provider Office Phone # Fax #    Sherice Jamil MD PhD [phone]       [address] [place] [address]        Equal Access to Services     [facility]ICK[facility] : Hadii aad ku hadasho Soomaali, waaxda luqadaha, qaybta kaalmada adeegyada, jaswinder vora . So [facility] [phone].    ATENCIÓN: Si habla español, tiene a paez disposición servicios gratuitos de asistencia lingüística. Llame al [phone].    We comply with applicable federal civil rights laws and [place] laws. We do not discriminate on the basis of race, color, national origin, age, disability, sex, sexual orientation, or gender  identity.            Thank you!     Thank you for choosing [place] [facility] PHYSICAL THERAPY  for your care. Our goal is always to provide you with excellent care. Hearing back from our patients is one way we can continue to improve our services. Please take a few minutes to complete the written survey that you may receive in the mail after your visit with us. Thank you!             Your Updated Medication List - Protect others around you: Learn how to safely use, store and throw away your medicines at www.disposemymeds.org.          This list is accurate as of 4/13/18 10:56 AM.  Always use your most recent med list.                   Brand Name Dispense Instructions for use Diagnosis    Calcium-Magnesium 250-125 MG Tabs      (Chewables) take 4 daily        clonazePAM 0.5 MG tablet    klonoPIN    10 tablet    Take 1 tablet (0.5 mg) by mouth 2 times daily as needed for anxiety    BECCA (generalized anxiety disorder)       estradiol 0.0375 MG/24HR BIW patch    VIVELLE-DOT    24 patch    Place 1 patch onto the skin twice a week    Vasomotor symptoms due to menopause       mometasone 0.1 % cream    ELOCON          PROBIOTIC DAILY PO      Take by mouth as needed        progesterone 100 MG capsule    PROMETRIUM    90 capsule    Take 1 capsule (100 mg) by mouth daily    Vasomotor symptoms due to menopause       vitamin D 2000 units Caps      Take 2 daily

## 2018-04-14 NOTE — PROGRESS NOTES
[place] [facility] Athletic Medicine Initial Evaluation  Subjective:  Patient is a 57 year old female presenting with rehab left ankle/foot hpi.                                    General health as reported by patient is good.  Pertinent medical history includes:  Menopausal.  Medical allergies: no.  Other surgeries include:  None reported.  Current medications:  Hormone replacement therapy.  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  Primary job tasks include:  Prolonged sitting and other (computer work).    Barriers include:  None as reported by patient.    Red flags:  Pain at rest/night (numbness/tingling).      Oswestry Score: 18 %                 Objective:  System    Physical Exam    General     ROS    Assessment/Plan:

## 2018-04-25 ENCOUNTER — THERAPY VISIT (OUTPATIENT)
Dept: PHYSICAL THERAPY | Facility: CLINIC | Age: 58
End: 2018-04-25
Payer: COMMERCIAL

## 2018-04-25 DIAGNOSIS — M54.50 LUMBAGO: Primary | ICD-10-CM

## 2018-04-25 DIAGNOSIS — R10.2 PERINEUM PAIN, FEMALE: Primary | ICD-10-CM

## 2018-04-25 PROCEDURE — 97140 MANUAL THERAPY 1/> REGIONS: CPT | Mod: GP | Performed by: PHYSICAL THERAPIST

## 2018-04-25 PROCEDURE — 97110 THERAPEUTIC EXERCISES: CPT | Mod: GP | Performed by: PHYSICAL THERAPIST

## 2018-04-30 ENCOUNTER — TELEPHONE (OUTPATIENT)
Dept: GASTROENTEROLOGY | Facility: CLINIC | Age: 58
End: 2018-04-30

## 2018-04-30 DIAGNOSIS — Z12.11 ENCOUNTER FOR SCREENING COLONOSCOPY: Primary | ICD-10-CM

## 2018-04-30 NOTE — TELEPHONE ENCOUNTER
Patient scheduled for colonoscopy     Indication for procedure. History of colonic polyps     Referring Provider. Sherice Jamil MD PhD    ? No     Arrival time verified? Patient instructed to arrive at 930.     Facility location verified? [address] [facility], 5th floor     Instructions given regarding prep and procedure. Transportation policy reviewed and verbalized understanding. Denied prep review    Prep Type Golytely.     Are you taking any anticoagulants or blood thinners? Denies     Instructions given? Yes     Electronic implanted devices? Denies     Pre procedure teaching completed? Yes    Transportation from procedure? Yes     H&P / Pre op physical completed? N/A    Charles Duffy RN

## 2018-05-04 ENCOUNTER — HOSPITAL ENCOUNTER (OUTPATIENT)
Facility: AMBULATORY SURGERY CENTER | Age: 58
End: 2018-05-04
Attending: INTERNAL MEDICINE
Payer: COMMERCIAL

## 2018-05-04 ENCOUNTER — SURGERY (OUTPATIENT)
Age: 58
End: 2018-05-04

## 2018-05-04 VITALS
RESPIRATION RATE: 16 BRPM | TEMPERATURE: 98 F | SYSTOLIC BLOOD PRESSURE: 112 MMHG | OXYGEN SATURATION: 100 % | DIASTOLIC BLOOD PRESSURE: 70 MMHG

## 2018-05-04 LAB — COLONOSCOPY: NORMAL

## 2018-05-04 RX ORDER — FENTANYL CITRATE 50 UG/ML
INJECTION, SOLUTION INTRAMUSCULAR; INTRAVENOUS PRN
Status: DISCONTINUED | OUTPATIENT
Start: 2018-05-04 | End: 2018-05-04 | Stop reason: HOSPADM

## 2018-05-04 RX ORDER — LIDOCAINE 40 MG/G
CREAM TOPICAL
Status: DISCONTINUED | OUTPATIENT
Start: 2018-05-04 | End: 2018-05-05 | Stop reason: HOSPADM

## 2018-05-04 RX ORDER — ONDANSETRON 2 MG/ML
4 INJECTION INTRAMUSCULAR; INTRAVENOUS
Status: DISCONTINUED | OUTPATIENT
Start: 2018-05-04 | End: 2018-05-05 | Stop reason: HOSPADM

## 2018-05-04 RX ORDER — DIPHENHYDRAMINE HYDROCHLORIDE 50 MG/ML
INJECTION INTRAMUSCULAR; INTRAVENOUS PRN
Status: DISCONTINUED | OUTPATIENT
Start: 2018-05-04 | End: 2018-05-04 | Stop reason: HOSPADM

## 2018-05-04 RX ADMIN — DIPHENHYDRAMINE HYDROCHLORIDE 25 MG: 50 INJECTION INTRAMUSCULAR; INTRAVENOUS at 11:11

## 2018-05-04 RX ADMIN — FENTANYL CITRATE 50 MCG: 50 INJECTION, SOLUTION INTRAMUSCULAR; INTRAVENOUS at 11:12

## 2018-05-04 RX ADMIN — FENTANYL CITRATE 50 MCG: 50 INJECTION, SOLUTION INTRAMUSCULAR; INTRAVENOUS at 11:33

## 2018-05-07 LAB — COPATH REPORT: NORMAL

## 2018-05-09 ENCOUNTER — THERAPY VISIT (OUTPATIENT)
Dept: PHYSICAL THERAPY | Facility: CLINIC | Age: 58
End: 2018-05-09
Payer: COMMERCIAL

## 2018-05-09 DIAGNOSIS — M54.50 LUMBAGO: ICD-10-CM

## 2018-05-09 PROCEDURE — 97140 MANUAL THERAPY 1/> REGIONS: CPT | Mod: GP | Performed by: PHYSICAL THERAPIST

## 2018-05-09 PROCEDURE — 97110 THERAPEUTIC EXERCISES: CPT | Mod: GP | Performed by: PHYSICAL THERAPIST

## 2018-05-15 ASSESSMENT — PATIENT HEALTH QUESTIONNAIRE - PHQ9: SUM OF ALL RESPONSES TO PHQ QUESTIONS 1-9: 0

## 2018-05-23 ENCOUNTER — THERAPY VISIT (OUTPATIENT)
Dept: PHYSICAL THERAPY | Facility: CLINIC | Age: 58
End: 2018-05-23
Payer: COMMERCIAL

## 2018-05-23 DIAGNOSIS — M54.50 LUMBAGO: ICD-10-CM

## 2018-05-23 PROCEDURE — 97112 NEUROMUSCULAR REEDUCATION: CPT | Mod: GP | Performed by: PHYSICAL THERAPIST

## 2018-05-23 PROCEDURE — 97110 THERAPEUTIC EXERCISES: CPT | Mod: GP | Performed by: PHYSICAL THERAPIST

## 2018-05-23 NOTE — MR AVS SNAPSHOT
After Visit Summary   5/23/2018    Casie Harris    MRN: [id number]           Patient Information     Date Of Birth          1960        Visit Information        Provider Department      5/23/2018 10:50 AM Linda Minor, PT [facility] Physical Therapy        Today's Diagnoses     Lumbago           Follow-ups after your visit        Your next 10 appointments already scheduled     Jun 01, 2018 10:10 AM CDT   (Arrive by 9:55 AM)   ADIS For Women Only with Lelo Torre, PT   [facility] Athletic [facility] ([facility]  )    [address]  [facility] [address]   [phone]            Jun 12, 2018  3:10 PM CDT   ADIS Spine with Linda Minor, PT   [facility] Therapy ([facility]  )    [address]  [facility] [address] [phone]              Who to contact     If you have questions or need follow up information about today's clinic visit or your schedule please contact [facility] ATHLETIC [facility] PHYSICAL THERAPY directly at [phone].  Normal or non-critical lab and imaging results will be communicated to you by Koemeihart, letter or phone within 4 business days after the clinic has received the results. If you do not hear from us within 7 days, please contact the clinic through Koemeihart or phone. If you have a critical or abnormal lab result, we will notify you by phone as soon as possible.  Submit refill requests through Resolver or call your pharmacy and they will forward the refill request to us. Please allow 3 business days for your refill to be completed.          Additional Information About Your Visit        Koemeihart Information     Resolver gives you secure access to your electronic health record. If you see a primary care provider, you can also send messages to your care team and make appointments. If you have questions, please call your  primary care clinic.  If you do not have a primary care provider, please call [phone] and they will assist you.        Care EveryWhere ID     This is your Care EveryWhere ID. This could be used by other organizations to access your [place] medical records  [id number]        Your Vitals Were     Last Period                   03/25/2012            Blood Pressure from Last 3 Encounters:   05/04/18 112/70   04/02/18 110/74   07/24/17 113/70    Weight from Last 3 Encounters:   04/02/18 75.3 kg (166 lb)   08/17/17 72.1 kg (159 lb)   07/24/17 73.2 kg (161 lb 4.8 oz)              We Performed the Following     NEUROMUSCULAR RE-EDUCATION     THERAPEUTIC EXERCISES        Primary Care Provider Office Phone # Fax #    Sherice Jamil MD PhD [phone]       [address] [place] [address]        Equal Access to Services     [facility] : Hadii sara goel hadasho Sojeremi, waaxda luqadaha, qaybta kaalmada adeegyada, waxay sophiain haysabina vora . So [facility] [phone].    ATENCIÓN: Si habla español, tiene a paez disposición servicios gratuitos de asistencia lingüística. Michelle al [phone].    We comply with applicable federal civil rights laws and [place] laws. We do not discriminate on the basis of race, color, national origin, age, disability, sex, sexual orientation, or gender identity.            Thank you!     Thank you for choosing [facility] PHYSICAL THERAPY  for your care. Our goal is always to provide you with excellent care. Hearing back from our patients is one way we can continue to improve our services. Please take a few minutes to complete the written survey that you may receive in the mail after your visit with us. Thank you!             Your Updated Medication List - Protect others around you: Learn how to safely use, store and throw away your medicines at www.disposemymeds.org.          This list is accurate as of 5/23/18 11:30 AM.   Always use your most recent med list.                   Brand Name Dispense Instructions for use Diagnosis    Calcium-Magnesium 250-125 MG Tabs      (Chewables) take 4 daily        clonazePAM 0.5 MG tablet    klonoPIN    10 tablet    Take 1 tablet (0.5 mg) by mouth 2 times daily as needed for anxiety    BECCA (generalized anxiety disorder)       estradiol 0.0375 MG/24HR BIW patch    VIVELLE-DOT    24 patch    Place 1 patch onto the skin twice a week    Vasomotor symptoms due to menopause       mometasone 0.1 % cream    ELOCON          PROBIOTIC DAILY PO      Take by mouth as needed        progesterone 100 MG capsule    PROMETRIUM    90 capsule    Take 1 capsule (100 mg) by mouth daily    Vasomotor symptoms due to menopause       vitamin D 2000 units Caps      Take 2 daily

## 2018-06-01 ENCOUNTER — THERAPY VISIT (OUTPATIENT)
Dept: PHYSICAL THERAPY | Facility: CLINIC | Age: 58
End: 2018-06-01
Payer: COMMERCIAL

## 2018-06-01 DIAGNOSIS — M99.05 SOMATIC DYSFUNCTION OF PELVIS REGION: ICD-10-CM

## 2018-06-01 DIAGNOSIS — R10.2 PELVIC PAIN IN FEMALE: Primary | ICD-10-CM

## 2018-06-01 PROCEDURE — 97535 SELF CARE MNGMENT TRAINING: CPT | Mod: GP | Performed by: PHYSICAL THERAPIST

## 2018-06-01 PROCEDURE — 97112 NEUROMUSCULAR REEDUCATION: CPT | Mod: GP | Performed by: PHYSICAL THERAPIST

## 2018-06-01 PROCEDURE — 97110 THERAPEUTIC EXERCISES: CPT | Mod: GP | Performed by: PHYSICAL THERAPIST

## 2018-06-01 PROCEDURE — 97161 PT EVAL LOW COMPLEX 20 MIN: CPT | Mod: GP | Performed by: PHYSICAL THERAPIST

## 2018-06-01 NOTE — MR AVS SNAPSHOT
After Visit Summary   6/1/2018    Casie Harris    MRN: [id number]           Patient Information     Date Of Birth          1960        Visit Information        Provider Department      6/1/2018 10:10 AM Lelo Torre, PT [facility]        Today's Diagnoses     Pelvic pain in female    -  1    Somatic dysfunction of pelvis region           Follow-ups after your visit        Your next 10 appointments already scheduled     Jun 12, 2018  3:10 PM CDT   [facility] Spine with Linda Minor PT   [facility] ([facility]  )    [address]  [facility] [address] [phone]              Who to contact     If you have questions or need follow up information about today's clinic visit or your schedule please contact [place] [facility] directly at [phone].  Normal or non-critical lab and imaging results will be communicated to you by [employer], letter or phone within 4 business days after the clinic has received the results. If you do not hear from us within 7 days, please contact the clinic through [employer] or phone. If you have a critical or abnormal lab result, we will notify you by phone as soon as possible.  Submit refill requests through [employer] or call your pharmacy and they will forward the refill request to us. Please allow 3 business days for your refill to be completed.          Additional Information About Your Visit        MyChart Information     [employer] gives you secure access to your electronic health record. If you see a primary care provider, you can also send messages to your care team and make appointments. If you have questions, please call your primary care clinic.  If you do not have a primary care provider, please call [phone] and they will assist you.        Care EveryWhere ID     This is your Care EveryWhere ID. This could  be used by other organizations to access your [place] medical records  [id number]        Your Vitals Were     Last Period                   03/25/2012            Blood Pressure from Last 3 Encounters:   05/04/18 112/70   04/02/18 110/74   07/24/17 113/70    Weight from Last 3 Encounters:   04/02/18 75.3 kg (166 lb)   08/17/17 72.1 kg (159 lb)   07/24/17 73.2 kg (161 lb 4.8 oz)              We Performed the Following     HC PT EVAL, LOW COMPLEXITY     [facility] INITIAL EVAL REPORT     NEUROMUSCULAR RE-EDUCATION     SELF CARE MNGMENT TRAINING     THERAPEUTIC EXERCISES        Primary Care Provider Office Phone # Fax #    Sherice Balaji Jamil MD PhD [phone]       [address] [place] [address]        Equal Access to Services     AMBREEN FERRARI : Hadii sara goel hadasho Sojeremi, waaxda luqadaha, qaybta kaalmada adeegyada, jaswinder vora . So [facility] [phone].    ATENCIÓN: Si habla español, tiene a paez disposición servicios gratuitos de asistencia lingüística. Llame al [phone].    We comply with applicable federal civil rights laws and [place] laws. We do not discriminate on the basis of race, color, national origin, age, disability, sex, sexual orientation, or gender identity.            Thank you!     Thank you for choosing [facility]  for your care. Our goal is always to provide you with excellent care. Hearing back from our patients is one way we can continue to improve our services. Please take a few minutes to complete the written survey that you may receive in the mail after your visit with us. Thank you!             Your Updated Medication List - Protect others around you: Learn how to safely use, store and throw away your medicines at www.disposemymeds.org.          This list is accurate as of 6/1/18 12:34 PM.  Always use your most recent med list.                   Brand Name Dispense Instructions for use  Diagnosis    Calcium-Magnesium 250-125 MG Tabs      (Chewables) take 4 daily        clonazePAM 0.5 MG tablet    klonoPIN    10 tablet    Take 1 tablet (0.5 mg) by mouth 2 times daily as needed for anxiety    BECCA (generalized anxiety disorder)       estradiol 0.0375 MG/24HR BIW patch    VIVELLE-DOT    24 patch    Place 1 patch onto the skin twice a week    Vasomotor symptoms due to menopause       mometasone 0.1 % cream    ELOCON          PROBIOTIC DAILY PO      Take by mouth as needed        progesterone 100 MG capsule    PROMETRIUM    90 capsule    Take 1 capsule (100 mg) by mouth daily    Vasomotor symptoms due to menopause       vitamin D 2000 units Caps      Take 2 daily

## 2018-06-01 NOTE — PROGRESS NOTES
"[place] [facility] Initial Evaluation  Subjective:  Patient is a 57 year old female presenting with rehab pelvic hpi. The history is provided by the patient. No  was used.   Casie Harris is a 57 year old female with a pelvic dysfunction condition.    Condition occurred: for unknown reasons.  This is a recurrent condition  Has tingling in the rectal and vaginal area that is worse with bending.  .  Notes when she voids she has to strain to void.  Has incomplete emptying with bowel movements.Has lichens sclerosis and is using a topical steroid.  Also has had a colonoscopy and had polyps removed and has a \"tortious colon\".  Has bowel movements daily and has small hemorrhoids.  Has been on estrogen patch but stopped.  Started this for hot flashes.  Works as a .  Has a history of endometriosis in her family.  Has not had children.  Not having pain with intercourse right now.   Very mild incontinence with coughing.  Also had back pain and is being treated for this.   .    Patient reports pain:  Vaginal and anal.    Pain is described as aching  and reported as 3/10.     Symptoms are exacerbated by sitting and relieved by nothing.  Since onset symptoms are unchanged.                              Red flags:  None as reported by the patient.                        Objective:  System                                 Pelvic Dysfunction Evaluation:    Bladder/Pelvic Problems:  Pelvic pain with sitting, incomplete emptying bowel          Diagnostic Tests:    Pelvic Exam:  X, has lichens scerosis                      Flexibility:  normal      Abdominal Wall:  normal        Pelvic Clock Exam:            Perineal Body:  +  SI Provocation:  NA        Reflex Testing:  normal        SEMG Biofeedback:    Equipment:  MR20    SurGlobal Filmdemicace electrode placement--Perianal:  X  Baseline EMG PM:  Slightly elevated at 3.0mv        EMG interpretation to fatigue:  5-8 seconds  Additional " History:    Number of Pregnancies: 0                         General     ROS    Assessment/Plan:    Patient is a 57 year old female with pelvic complaints.    Patient has the following significant findings with corresponding treatment plan.                Diagnosis 1:  Pelvic pain  Pain -  manual therapy, self management, education and home program  Decreased ROM/flexibility - manual therapy, therapeutic exercise and home program  Decreased strength - therapeutic exercise, therapeutic activities and home program  Impaired muscle performance - biofeedback, neuro re-education and home program  Decreased function - therapeutic activities and home program    Therapy Evaluation Codes:   1) History comprised of:   Personal factors that impact the plan of care:      Time since onset of symptoms.    Comorbidity factors that impact the plan of care are:      None.     Medications impacting care: None.  2) Examination of Body Systems comprised of:   Body structures and functions that impact the plan of care:      Pelvis.   Activity limitations that impact the plan of care are:      Sitting and strain with bowel movements.  3) Clinical presentation characteristics are:   Stable/Uncomplicated.  4) Decision-Making    Low complexity using standardized patient assessment instrument and/or measureable assessment of functional outcome.  Cumulative Therapy Evaluation is: Low complexity.    Previous and current functional limitations:  (See Goal Flow Sheet for this information)    Short term and Long term goals: (See Goal Flow Sheet for this information)     Communication ability:  Patient appears to be able to clearly communicate and understand verbal and written communication and follow directions correctly.  Treatment Explanation - The following has been discussed with the patient:   RX ordered/plan of care  Anticipated outcomes  Possible risks and side effects  This patient would benefit from PT intervention to resume normal  activities.   Rehab potential is excellent.    Frequency:  One visit  Duration:  for one visit  Discharge Plan:  Independent in home treatment program.  Reach maximal therapeutic benefit.    Please refer to the daily flowsheet for treatment today, total treatment time and time spent performing 1:1 timed codes.

## 2018-06-05 NOTE — PROGRESS NOTES
[place] [facility] Initial Evaluation  Subjective:  Patient is a 57 year old female presenting with rehab left ankle/foot hpi.                                    General health as reported by patient is good.  Pertinent medical history includes:  Other, menopausal and numbness/tingling (changes in bowel/bladder and weakness).    Other surgeries include:  Other (HPU/laser 1988?).  Current medications:  Hormone replacement therapy (profesteron).  Current occupation is Writer  .    Primary job tasks include:  Prolonged sitting and other (computer work).    Barriers include:  None as reported by patient.    Red flags:  None as reported by patient.                        Objective:  System    Physical Exam    General     ROS    Assessment/Plan:

## 2018-06-19 ENCOUNTER — OFFICE VISIT (OUTPATIENT)
Dept: OBGYN | Facility: CLINIC | Age: 58
End: 2018-06-19
Attending: OBSTETRICS & GYNECOLOGY
Payer: COMMERCIAL

## 2018-06-19 VITALS
HEART RATE: 80 BPM | WEIGHT: 163 LBS | DIASTOLIC BLOOD PRESSURE: 74 MMHG | HEIGHT: 71 IN | BODY MASS INDEX: 22.82 KG/M2 | SYSTOLIC BLOOD PRESSURE: 111 MMHG

## 2018-06-19 DIAGNOSIS — R10.2 PELVIC PAIN IN FEMALE: Primary | ICD-10-CM

## 2018-06-19 LAB
BASOPHILS # BLD AUTO: 0 10E9/L (ref 0–0.2)
BASOPHILS NFR BLD AUTO: 0.2 %
DIFFERENTIAL METHOD BLD: NORMAL
EOSINOPHIL # BLD AUTO: 0.1 10E9/L (ref 0–0.7)
EOSINOPHIL NFR BLD AUTO: 1.2 %
ERYTHROCYTE [DISTWIDTH] IN BLOOD BY AUTOMATED COUNT: 12.6 % (ref 10–15)
HCT VFR BLD AUTO: 42.6 % (ref 35–47)
HGB BLD-MCNC: 13.9 G/DL (ref 11.7–15.7)
IMM GRANULOCYTES # BLD: 0 10E9/L (ref 0–0.4)
IMM GRANULOCYTES NFR BLD: 0.1 %
LYMPHOCYTES # BLD AUTO: 3.6 10E9/L (ref 0.8–5.3)
LYMPHOCYTES NFR BLD AUTO: 43.2 %
MCH RBC QN AUTO: 31 PG (ref 26.5–33)
MCHC RBC AUTO-ENTMCNC: 32.6 G/DL (ref 31.5–36.5)
MCV RBC AUTO: 95 FL (ref 78–100)
MONOCYTES # BLD AUTO: 0.3 10E9/L (ref 0–1.3)
MONOCYTES NFR BLD AUTO: 3.2 %
NEUTROPHILS # BLD AUTO: 4.4 10E9/L (ref 1.6–8.3)
NEUTROPHILS NFR BLD AUTO: 52.1 %
NRBC # BLD AUTO: 0 10*3/UL
NRBC BLD AUTO-RTO: 0 /100
PLATELET # BLD AUTO: 248 10E9/L (ref 150–450)
RBC # BLD AUTO: 4.48 10E12/L (ref 3.8–5.2)
WBC # BLD AUTO: 8.4 10E9/L (ref 4–11)

## 2018-06-19 PROCEDURE — G0463 HOSPITAL OUTPT CLINIC VISIT: HCPCS | Mod: ZF

## 2018-06-19 PROCEDURE — 36415 COLL VENOUS BLD VENIPUNCTURE: CPT | Performed by: OBSTETRICS & GYNECOLOGY

## 2018-06-19 PROCEDURE — 85025 COMPLETE CBC W/AUTO DIFF WBC: CPT | Performed by: OBSTETRICS & GYNECOLOGY

## 2018-06-19 NOTE — MR AVS SNAPSHOT
After Visit Summary   6/19/2018    Casie Harris    MRN: [id number]           Patient Information     Date Of Birth          1960        Visit Information        Provider Department      6/19/2018 3:30 PM Fauzia Ng MD [facility]        Today's Diagnoses     Pelvic pain in female    -  1       Follow-ups after your visit        Your next 10 appointments already scheduled     Jun 26, 2018 10:30 AM CDT   [facility]   [facility] ([facility])    [facility] [address]  [address]  [facility] [address] [phone]            Jun 26, 2018 11:00 AM CDT   Return Visit with Fauzia Ng MD   [facility] ([facility])    [facility] [address]  [address]  [facility] [address] [phone]              Who to contact     Please call your clinic at [phone] to:    Ask questions about your health    Make or cancel appointments    Discuss your medicines    Learn about your test results    Speak to your doctor            Additional Information About Your Visit        MyChart Information     [employer] gives you secure access to your electronic health record. If you see a primary care provider, you can also send messages to your care team and make appointments. If you have questions, please call your primary care clinic.  If you do not have a primary care provider, please call [phone] and they will assist you.      [employer] is an electronic gateway that provides easy, online access to your medical records. With [employer], you can request a clinic appointment, read your test results, renew a prescription or communicate with your care team.     To access your existing account, please contact your [facility] or call [phone] for assistance.        Care EveryWhere ID     This is  "your Care EveryWhere ID. This could be used by other organizations to access your [place] medical records  [id number]        Your Vitals Were     Pulse Height Last Period BMI (Body Mass Index)          80 1.803 m (5' 11\") 03/25/2012 22.73 kg/m2         Blood Pressure from Last 3 Encounters:   06/19/18 111/74   05/04/18 112/70   04/02/18 110/74    Weight from Last 3 Encounters:   06/19/18 73.9 kg (163 lb)   04/02/18 75.3 kg (166 lb)   08/17/17 72.1 kg (159 lb)              We Performed the Following     CBC with Platelets Differential        Primary Care Provider Office Phone # Fax #    Sherice Jamil MD PhD [phone]       [address] [place] [address]        Equal Access to Services     [facility] : Hadii aad ku hadasho Sojeremi, waaxda luqadaha, qaybta kaalmada adeshruthi, jaswinder vora . So [facility] [phone].    ATENCIÓN: Si habla español, tiene a paez disposición servicios gratuitos de asistencia lingüística. Michelle al [phone].    We comply with applicable federal civil rights laws and [place] laws. We do not discriminate on the basis of race, color, national origin, age, disability, sex, sexual orientation, or gender identity.            Thank you!     Thank you for choosing [facility]  for your care. Our goal is always to provide you with excellent care. Hearing back from our patients is one way we can continue to improve our services. Please take a few minutes to complete the written survey that you may receive in the mail after your visit with us. Thank you!             Your Updated Medication List - Protect others around you: Learn how to safely use, store and throw away your medicines at www.disposemymeds.org.          This list is accurate as of 6/19/18 11:59 PM.  Always use your most recent med list.                   Brand Name Dispense Instructions for use Diagnosis    Calcium-Magnesium 250-125 MG Tabs      " (Chewables) take 4 daily        clonazePAM 0.5 MG tablet    klonoPIN    10 tablet    Take 1 tablet (0.5 mg) by mouth 2 times daily as needed for anxiety    BECCA (generalized anxiety disorder)       estradiol 0.0375 MG/24HR BIW patch    VIVELLE-DOT    24 patch    Place 1 patch onto the skin twice a week    Vasomotor symptoms due to menopause       mometasone 0.1 % cream    ELOCON          PROBIOTIC DAILY PO      Take by mouth as needed        progesterone 100 MG capsule    PROMETRIUM    90 capsule    Take 1 capsule (100 mg) by mouth daily    Vasomotor symptoms due to menopause       vitamin D 2000 units Caps      Take 2 daily

## 2018-06-19 NOTE — LETTER
"6/19/2018       RE: Casie Harris  [address] [place] [address]     Dear Colleague,    Thank you for referring your patient, Casie Harris, to the [facility] at [facility]. Please see a copy of my visit note below.    Gynecology Visit Note  6/19/18    Reason for visit: Pelvic pain    S: Patient is a 56 yo nulligravid postmenopausal female who presents today to discuss concerns about pelvic pain.  Patient states she has another OB/GYN she has seen for many years, but has decided to come here for care to consolidate her care at the .  Patient states her biggest concern today is that for about the last 6 months she has noticed increasing back pain, abdominal pain and pelvic pain.  The patient believes this is likely secondary to \"stiff muscles\" but she wants to make sure there isn't anything else going on.  With regards to her pelvic pain, she describes it as crampy and dull in nature like pre-menstrual cramping.  She also has some associated tingling and bloating.  She notices that the pain is worsened with bending over.  Patient states she wants to rule out problems with her gynecological organs.      Past OB/GYN History:  Nulligravid  Menses: Went through menopause at 52 years of age.  No PMB.  Patient went on progesterone only for hormone replacement in 2012 which she stopped after a year.  She then starting having issues with hot flashes and for the last 1.5 years ago she went on a estrogen patch and oral progesterone.  She went off of these 2 weeks ago but does state she has felt increased hot flashes and brain fog since stopping the HRT.  Patient diagnosed recently with HSV-1 of the genitals, presented with increased pain and lesions at that time  Patient with history of lichen sclerosus of the vulva for which she is currently using mometasone twice weekly  She is sexually active with her   Pap smears: Remote history of " abnormal pap in the 80s.  She was treated with laser and 5-FU per her report, felt she was over-treated.  Last pap smear was in 11 or 12/2015 and was normal.    Due to concerns of her pain, she has been doing acupuncture and PT and has recently looked into PFT    Past Medical History:   Diagnosis Date     Abnormal Pap smear      Arthritis      Autoimmune disease (H) 1987     Colon polyps 2014     Generalised anxiety disorder      Hearing problem 2012, 2017     Lichen sclerosus      Sensorineural hearing loss 2012, 2017     Tinnitus April 2017     Past Surgical History:   Procedure Laterality Date     COLONOSCOPY  03/2014    4 polyps, repeat 3-5 yrs,fragments of tubular adenoma     COLONOSCOPY N/A 5/4/2018    Procedure: COMBINED COLONOSCOPY, SINGLE OR MULTIPLE BIOPSY/POLYPECTOMY BY BIOPSY;  Colonoscopy;  Surgeon: Jaycee Day MD;  Location: [facility] OR polyp was hyperplastic - repeeat in 3 yrs because of poor prep     DILATION AND CURETTAGE  1994     HPV  1988    laser surgery  for HPV and was on 5FU     LASER CO2 CERVIX         Current Outpatient Prescriptions on File Prior to Visit:  Calcium-Magnesium 250-125 MG TABS (Chewables) take 4 daily   Cholecalciferol (VITAMIN D) 2000 UNIT CAPS Take 2 daily   clonazePAM (KLONOPIN) 0.5 MG tablet Take 1 tablet (0.5 mg) by mouth 2 times daily as needed for anxiety   estradiol (VIVELLE-DOT) 0.0375 MG/24HR BIW patch Place 1 patch onto the skin twice a week   mometasone (ELOCON) 0.1 % cream    Probiotic Product (PROBIOTIC DAILY PO) Take by mouth as needed    progesterone (PROMETRIUM) 100 MG capsule Take 1 capsule (100 mg) by mouth daily     No current facility-administered medications on file prior to visit.      Allergies   Allergen Reactions     Nkda [No Known Drug Allergies]      Social History   Substance Use Topics     Smoking status: Never Smoker     Smokeless tobacco: Never Used     Alcohol use 0.0 oz/week      Comment: daily glass of wine     Family History   Problem Relation  "Age of Onset     Prostate Cancer Father      HEART DISEASE Father      Cardiovascular Father      AF CHF     Cancer Father      BCC     Neurologic Disorder Father      Cerebrovascular Disease Sister      Lipids Sister      HEART DISEASE Maternal Grandfather      HEART DISEASE Paternal Grandmother      Cerebrovascular Disease Mother      Breast Cancer Paternal Aunt      Suicide Sister      Depression Sister      Mental Illness Sister      Breast Cancer Other      ROS: A complete 10 point ROS was conducted and was negative aside from that noted in the HPI    O:  Vitals:    06/19/18 1526   BP: 111/74   BP Location: Right arm   Patient Position: Chair   Pulse: 80   Weight: 73.9 kg (163 lb)   Height: 1.803 m (5' 11\")     General: NAD, A&Ox3  Resp: Non-labored breathing    A/P: 56 yo nulligravid female presents with complaints of pelvic pain  1) Pelvic pain: Given patient's entire history, unsure that her pain is going to be coming from a structural source from a GYN standpoint.  That being said, given her concerns, it is not unreasonable to proceed with a Pelvic US.  Patient is happy to have this as she feels it will rule out bad things and help her move forward with continued work with PT and PFT.  Patient will get Pelvic US and RTC for follow-up visit to discuss results.  She understands and is agreeable with this plan.    Fauzia Ng MD  "

## 2018-06-24 NOTE — PROGRESS NOTES
"Gynecology Visit Note  6/19/18    Reason for visit: Pelvic pain    S: Patient is a 56 yo nulligravid postmenopausal female who presents today to discuss concerns about pelvic pain.  Patient states she has another OB/GYN she has seen for many years, but has decided to come here for care to consolidate her care at the .  Patient states her biggest concern today is that for about the last 6 months she has noticed increasing back pain, abdominal pain and pelvic pain.  The patient believes this is likely secondary to \"stiff muscles\" but she wants to make sure there isn't anything else going on.  With regards to her pelvic pain, she describes it as crampy and dull in nature like pre-menstrual cramping.  She also has some associated tingling and bloating.  She notices that the pain is worsened with bending over.  Patient states she wants to rule out problems with her gynecological organs.      Past OB/GYN History:  Nulligravid  Menses: Went through menopause at 52 years of age.  No PMB.  Patient went on progesterone only for hormone replacement in 2012 which she stopped after a year.  She then starting having issues with hot flashes and for the last 1.5 years ago she went on a estrogen patch and oral progesterone.  She went off of these 2 weeks ago but does state she has felt increased hot flashes and brain fog since stopping the HRT.  Patient diagnosed recently with HSV-1 of the genitals, presented with increased pain and lesions at that time  Patient with history of lichen sclerosus of the vulva for which she is currently using mometasone twice weekly  She is sexually active with her   Pap smears: Remote history of abnormal pap in the 80s.  She was treated with laser and 5-FU per her report, felt she was over-treated.  Last pap smear was in 11 or 12/2015 and was normal.    Due to concerns of her pain, she has been doing acupuncture and PT and has recently looked into PFT    Past Medical History:   Diagnosis Date "     Abnormal Pap smear      Arthritis      Autoimmune disease (H) 1987     Colon polyps 2014     Generalised anxiety disorder      Hearing problem 2012, 2017     Lichen sclerosus      Sensorineural hearing loss 2012, 2017     Tinnitus April 2017     Past Surgical History:   Procedure Laterality Date     COLONOSCOPY  03/2014    4 polyps, repeat 3-5 yrs,fragments of tubular adenoma     COLONOSCOPY N/A 5/4/2018    Procedure: COMBINED COLONOSCOPY, SINGLE OR MULTIPLE BIOPSY/POLYPECTOMY BY BIOPSY;  Colonoscopy;  Surgeon: Jaycee Day MD;  [facility]: [facility] polyp was hyperplastic - repeeat in 3 yrs because of poor prep     DILATION AND CURETTAGE  1994     HPV  1988    laser surgery  for HPV and was on 5FU     LASER CO2 CERVIX         Current Outpatient Prescriptions on File Prior to Visit:  Calcium-Magnesium 250-125 MG TABS (Chewables) take 4 daily   Cholecalciferol (VITAMIN D) 2000 UNIT CAPS Take 2 daily   clonazePAM (KLONOPIN) 0.5 MG tablet Take 1 tablet (0.5 mg) by mouth 2 times daily as needed for anxiety   estradiol (VIVELLE-DOT) 0.0375 MG/24HR BIW patch Place 1 patch onto the skin twice a week   mometasone (ELOCON) 0.1 % cream    Probiotic Product (PROBIOTIC DAILY PO) Take by mouth as needed    progesterone (PROMETRIUM) 100 MG capsule Take 1 capsule (100 mg) by mouth daily     No current facility-administered medications on file prior to visit.      Allergies   Allergen Reactions     Nkda [No Known Drug Allergies]      Social History   Substance Use Topics     Smoking status: Never Smoker     Smokeless tobacco: Never Used     Alcohol use 0.0 oz/week      Comment: daily glass of wine     Family History   Problem Relation Age of Onset     Prostate Cancer Father      HEART DISEASE Father      Cardiovascular Father      AF CHF     Cancer Father      BCC     Neurologic Disorder Father      Cerebrovascular Disease Sister      Lipids Sister      HEART DISEASE Maternal Grandfather      HEART DISEASE Paternal Grandmother       "Cerebrovascular Disease Mother      Breast Cancer Paternal Aunt      Suicide Sister      Depression Sister      Mental Illness Sister      Breast Cancer Other      ROS: A complete 10 point ROS was conducted and was negative aside from that noted in the HPI    O:  Vitals:    06/19/18 1526   BP: 111/74   BP Location: Right arm   Patient Position: Chair   Pulse: 80   Weight: 73.9 kg (163 lb)   Height: 1.803 m (5' 11\")     General: NAD, A&Ox3  Resp: Non-labored breathing    A/P: 58 yo nulligravid female presents with complaints of pelvic pain  1) Pelvic pain: Given patient's entire history, unsure that her pain is going to be coming from a structural source from a GYN standpoint.  That being said, given her concerns, it is not unreasonable to proceed with a Pelvic US.  Patient is happy to have this as she feels it will rule out bad things and help her move forward with continued work with PT and PFT.  Patient will get Pelvic US and RTC for follow-up visit to discuss results.  She understands and is agreeable with this plan.    Fauzia Ng MD  "

## 2018-06-26 ENCOUNTER — OFFICE VISIT (OUTPATIENT)
Dept: OBGYN | Facility: CLINIC | Age: 58
End: 2018-06-26
Attending: OBSTETRICS & GYNECOLOGY
Payer: COMMERCIAL

## 2018-06-26 VITALS — HEART RATE: 71 BPM | DIASTOLIC BLOOD PRESSURE: 73 MMHG | SYSTOLIC BLOOD PRESSURE: 119 MMHG

## 2018-06-26 DIAGNOSIS — R19.5 CHANGE IN STOOL: ICD-10-CM

## 2018-06-26 DIAGNOSIS — R14.0 ABDOMINAL BLOATING: ICD-10-CM

## 2018-06-26 DIAGNOSIS — R10.2 PELVIC PAIN IN FEMALE: ICD-10-CM

## 2018-06-26 DIAGNOSIS — N83.201 CYST OF RIGHT OVARY: Primary | ICD-10-CM

## 2018-06-26 LAB — CANCER AG125 SERPL-ACNC: 7 U/ML (ref 0–30)

## 2018-06-26 PROCEDURE — 36415 COLL VENOUS BLD VENIPUNCTURE: CPT | Performed by: OBSTETRICS & GYNECOLOGY

## 2018-06-26 PROCEDURE — 86304 IMMUNOASSAY TUMOR CA 125: CPT | Performed by: OBSTETRICS & GYNECOLOGY

## 2018-06-26 PROCEDURE — 76830 TRANSVAGINAL US NON-OB: CPT | Mod: ZF

## 2018-06-26 RX ORDER — POLYETHYLENE GLYCOL 3350 17 G/17G
1 POWDER, FOR SOLUTION ORAL DAILY
Qty: 510 G | Refills: 1 | Status: SHIPPED | OUTPATIENT
Start: 2018-06-26 | End: 2018-08-01

## 2018-06-26 NOTE — MR AVS SNAPSHOT
After Visit Summary   6/26/2018    Casie Harris    MRN: [id number]           Patient Information     Date Of Birth          1960        Visit Information        Provider Department      6/26/2018 11:00 AM Fauzia Ng MD [facility]        Today's Diagnoses     Cyst of right ovary    -  1    Abdominal bloating        Change in stool           Follow-ups after your visit        Additional Services     GASTROENTEROLOGY ADULT REF CONSULT ONLY       Preferred Location: [facility] [phone]      Please be aware that coverage of these services is subject to the terms and limitations of your health insurance plan.  Call member services at your health plan with any benefit or coverage questions.  Any procedures must be performed at a [place] facility OR coordinated by your clinic's referral office.    Please bring the following with you to your appointment:    (1) Any X-Rays, CTs or MRIs which have been performed.  Contact the facility where they were done to arrange for  prior to your scheduled appointment.    (2) List of current medications   (3) This referral request   (4) Any documents/labs given to you for this referral                  Future tests that were ordered for you today     Open Future Orders        Priority Expected Expires Ordered    US GYN Complete Transvaginal - 30262 (In Clinic) Routine  10/24/2018 6/26/2018            Who to contact     Please call your clinic at [phone] to:    Ask questions about your health    Make or cancel appointments    Discuss your medicines    Learn about your test results    Speak to your doctor            Additional Information About Your Visit        MyChart Information     [employer] gives you secure access to your electronic health record. If you see a primary care provider, you can also send messages to your care team and make appointments. If you have questions, please call your primary care  clinic.  If you do not have a primary care provider, please call [phone] and they will assist you.      [employer] is an electronic gateway that provides easy, online access to your medical records. With [employer], you can request a clinic appointment, read your test results, renew a prescription or communicate with your care team.     To access your existing account, please contact your [facility] or call [phone] for assistance.        Care EveryWhere ID     This is your Care EveryWhere ID. This could be used by other organizations to access your [facility] medical records  [id number]        Your Vitals Were     Pulse Last Period                71 03/25/2012           Blood Pressure from Last 3 Encounters:   06/26/18 119/73   06/19/18 111/74   05/04/18 112/70    Weight from Last 3 Encounters:   06/19/18 73.9 kg (163 lb)   04/02/18 75.3 kg (166 lb)   08/17/17 72.1 kg (159 lb)              We Performed the Following          GASTROENTEROLOGY ADULT REF CONSULT ONLY          Today's Medication Changes          These changes are accurate as of 6/26/18 11:22 AM.  If you have any questions, ask your nurse or doctor.               Start taking these medicines.        Dose/Directions    polyethylene glycol powder   Commonly known as:  MIRALAX   Used for:  Abdominal bloating, Change in stool   Started by:  Fauzia Ng MD        Dose:  1 capful   Take 17 g (1 capful) by mouth daily   Quantity:  510 g   Refills:  1            Where to get your medicines      These medications were sent to [facility] [address] [place] [address] & [address]  [address] [place] [address]     Phone:  [phone]     polyethylene glycol powder                Primary Care Provider Office Phone # Fax #    Sherice Jamil MD PhD [phone]       [address] [place] [address]        Equal Access to Services     AMBREEN FERRARI  AH: Rishabhii sara shipleychristiantamar Tristan, wamariamada luqadaha, qaybta kabhavik carcamo jaswinder libra jhoanchente palaciosninidulce webber. So [facility] [phone].    ATENCIÓN: Si habla mateo, tiene a paez disposición servicios gratuitos de asistencia lingüística. Llame al [phone].    We comply with applicable federal civil rights laws and [place] laws. We do not discriminate on the basis of race, color, national origin, age, disability, sex, sexual orientation, or gender identity.            Thank you!     Thank you for choosing [facility]  for your care. Our goal is always to provide you with excellent care. Hearing back from our patients is one way we can continue to improve our services. Please take a few minutes to complete the written survey that you may receive in the mail after your visit with us. Thank you!             Your Updated Medication List - Protect others around you: Learn how to safely use, store and throw away your medicines at www.disposemymeds.org.          This list is accurate as of 6/26/18 11:22 AM.  Always use your most recent med list.                   Brand Name Dispense Instructions for use Diagnosis    Calcium-Magnesium 250-125 MG Tabs      (Chewables) take 4 daily        clonazePAM 0.5 MG tablet    klonoPIN    10 tablet    Take 1 tablet (0.5 mg) by mouth 2 times daily as needed for anxiety    BECCA (generalized anxiety disorder)       estradiol 0.0375 MG/24HR BIW patch    VIVELLE-DOT    24 patch    Place 1 patch onto the skin twice a week    Vasomotor symptoms due to menopause       mometasone 0.1 % cream    ELOCON          polyethylene glycol powder    MIRALAX    510 g    Take 17 g (1 capful) by mouth daily    Abdominal bloating, Change in stool       PROBIOTIC DAILY PO      Take by mouth as needed        progesterone 100 MG capsule    PROMETRIUM    90 capsule    Take 1 capsule (100 mg) by mouth daily    Vasomotor symptoms due to menopause       vitamin D 2000 units Caps       Take 2 daily

## 2018-06-26 NOTE — LETTER
6/26/2018       RE: Casie Harris  [address] [place] [address]     Dear Colleague,    Thank you for referring your patient, Casie Harris, to the [facility] at [facility]. Please see a copy of my visit note below.    57 year old female presents for gynecologic ultrasound indicated by pelvic pain.  This study was done transvaginally.    Uterine findings:   Presence: Visible Size: Normal 7.6 x 4.9 x 4.1 cm.  Endometrium = 2.8 mm.   Cx length = 31.9 mm.      Flexion:  Anteverted Position: Deviated left Margins: Normal Shape: Abnormal   Contour: Irregular Texture: Heterogeneous Cavity: Normal Masses: Abnormal- TREY/ left myoma:3.6 x 3.8 x 3.2cm    Pelvic findings:    Right Adnexa: Normal   Left Adnexa: Normal   Bladder:  Normal         Cul - de - sac fluid: None    Ovarian follicles:   Right ovary:  3.8 x 2.5 x 1.8cm.     1 hypoechoic mass with internal hyperechoic area without increased blood flow.      Size(s):  2.5 x 2.5 x 1.6cm     Left ovary:  3.9 x 1.7 x 1.9cm.     0 follicles           Comments:  Complex cyst in right ovary, recommend repeat ultrasound in 6-8 weeks to assess for resolution.  Results reviewed with patient by Dr. Ng today.    Lexi Solano, [facility]   Casie Peña MD    Again, thank you for allowing me to participate in the care of your patient.      Sincerely,    [id number]

## 2018-06-26 NOTE — MR AVS SNAPSHOT
After Visit Summary   6/26/2018    Casie Harris    MRN: [id number]           Patient Information     Date Of Birth          1960        Visit Information        Provider Department      6/26/2018 10:30 AM [facility] ULTRASOUND II [facility]        Today's Diagnoses     Pelvic pain in female           Follow-ups after your visit        Your next 10 appointments already scheduled     Aug 09, 2018 10:30 AM CDT   ULTRASOUND with [facility] ULTRASOUND II   [facility] ([facility])    [facility]  [address]  [address]  [facility] [place] [id number] [phone]            Aug 09, 2018 11:00 AM CDT   Return Visit with Fauzia Ng MD   [facility] ([facility])    [facility] [address]  [address]  [facility] [place] [address] [phone]              Future tests that were ordered for you today     Open Future Orders        Priority Expected Expires Ordered    US GYN Complete Transvaginal - 96992 (In Clinic) Routine  10/24/2018 6/26/2018            Who to contact     Please call your clinic at [phone] to:    Ask questions about your health    Make or cancel appointments    Discuss your medicines    Learn about your test results    Speak to your doctor            Additional Information About Your Visit        [employer] Information     [email or web address] gives you secure access to your electronic health record. If you see a primary care provider, you can also send messages to your care team and make appointments. If you have questions, please call your primary care clinic.  If you do not have a primary care provider, please call [phone] and they will assist you.      [email or web address] is an electronic gateway that provides easy, online access to your medical records. With [email or web address], you can request a clinic appointment, read your test results, renew a prescription or  communicate with your care team.     To access your existing account, please contact your [facility] or call [phone] for assistance.        Care EveryWhere ID     This is your Care EveryWhere ID. This could be used by other organizations to access your [facility] medical records  [id number]        Your Vitals Were     Last Period                   03/25/2012            Blood Pressure from Last 3 Encounters:   06/26/18 119/73   06/19/18 111/74   05/04/18 112/70    Weight from Last 3 Encounters:   06/19/18 73.9 kg (163 lb)   04/02/18 75.3 kg (166 lb)   08/17/17 72.1 kg (159 lb)              We Performed the Following     US GYN Complete Transvaginal - 38228 (In Clinic)          Today's Medication Changes          These changes are accurate as of 6/26/18  3:48 PM.  If you have any questions, ask your nurse or doctor.               Start taking these medicines.        Dose/Directions    polyethylene glycol powder   Commonly known as:  MIRALAX   Used for:  Abdominal bloating, Change in stool   Started by:  Fauzia Ng MD        Dose:  1 capful   Take 17 g (1 capful) by mouth daily   Quantity:  510 g   Refills:  1            Where to get your medicines      These medications were sent to [facility] [address] [place] [address] & [address]  [address] [place] [address]     Phone:  [phone]     polyethylene glycol powder                Primary Care Provider Office Phone # Fax #    Sherice Jamil MD PhD [phone]       [address] [place] [address]        Equal Access to Services     [facility] Foundation HospitalVICKI : Hadii sara schwarz Sojeremi, waaxda luqadaha, qaybta kaalmajaswinder canela. So [facility] [phone].    ATENCIÓN: Si habla español, tiene a paez disposición servicios gratuitos de asistencia lingüística. Llame al [phone].    We comply with applicable federal  civil rights laws and [place] laws. We do not discriminate on the basis of race, color, national origin, age, disability, sex, sexual orientation, or gender identity.            Thank you!     Thank you for choosing [facility]  for your care. Our goal is always to provide you with excellent care. Hearing back from our patients is one way we can continue to improve our services. Please take a few minutes to complete the written survey that you may receive in the mail after your visit with us. Thank you!             Your Updated Medication List - Protect others around you: Learn how to safely use, store and throw away your medicines at www.disposemymeds.org.          This list is accurate as of 6/26/18  3:48 PM.  Always use your most recent med list.                   Brand Name Dispense Instructions for use Diagnosis    Calcium-Magnesium 250-125 MG Tabs      (Chewables) take 4 daily        clonazePAM 0.5 MG tablet    klonoPIN    10 tablet    Take 1 tablet (0.5 mg) by mouth 2 times daily as needed for anxiety    BECCA (generalized anxiety disorder)       estradiol 0.0375 MG/24HR BIW patch    VIVELLE-DOT    24 patch    Place 1 patch onto the skin twice a week    Vasomotor symptoms due to menopause       mometasone 0.1 % cream    ELOCON          polyethylene glycol powder    MIRALAX    510 g    Take 17 g (1 capful) by mouth daily    Abdominal bloating, Change in stool       PROBIOTIC DAILY PO      Take by mouth as needed        progesterone 100 MG capsule    PROMETRIUM    90 capsule    Take 1 capsule (100 mg) by mouth daily    Vasomotor symptoms due to menopause       vitamin D 2000 units Caps      Take 2 daily

## 2018-06-26 NOTE — LETTER
6/26/2018       RE: Casie Harris  [address] [place] [address]     Dear Colleague,    Thank you for referring your patient, Casie Harris, to the [facility] at [facility]. Please see a copy of my visit note below.    Gynecology Visit Note  6/26/18    Reason for visit: Follow-up US    S: Patient is a 58 yo nulligravid female who presents today to discuss US results completed for patient concerns of structural anomalies possibly causing pelvic pain.  Patient recently seen for concerns of this on 6/19/18 and was thinking it was mostly associated with chronic stiff muscles, but wanted to rule out other concerns.  We discussed getting an US to evaluate for any structural abnormalities that could be contributing to her pain and she was happy with this plan.  She had her US completed today and presents to discuss results.    Patient does states she has concerns about her stools.  She feels that in the last 2 weeks, she has had increased bloating/cramping and associated changes in the caliber of her stool.  She feels the quality of her stools has varied from diarrhea to hard small stools.  She is not quite sure what is causing these changes.    O:  Vitals:    06/26/18 1051   BP: 119/73   Pulse: 71        General: NAD, A&Ox3  Resp: Non-labored breathing    Pelvic US 6/26/18:  Uterine findings:                        Presence: Visible Size: Normal 7.6 x 4.9 x 4.1 cm.  Endometrium = 2.8 mm.                        Cx length = 31.9 mm.                            Flexion:  Anteverted              Position: Deviated left           Margins: Normal                   Shape: Abnormal                        Contour: Irregular                  Texture: Heterogeneous                   Cavity: Normal                      Masses: Abnormal- TREY/ left myoma:3.6 x 3.8 x 3.2cm     Pelvic findings:                         Right Adnexa: Normal                         Left Adnexa: Normal                        Bladder:  Normal                                                                                                             Cul - de - sac fluid: None     Ovarian follicles:                        Right ovary:  3.8 x 2.5 x 1.8cm.                           1 hypoechoic mass with internal hyperechoic area without increased blood flow.                            Size(s):  2.5 x 2.5 x 1.6cm                           Left ovary:  3.9 x 1.7 x 1.9cm.                           0 follicles    A/P: 56 yo nulligravid postmenopausal female presents to discuss results completed for concerns of Pelvic Pain  1) Pelvic pain: US today shows 3.8 cm myoma.  Endometrial stripe 2.8 mm and normal for postmenopausal female.  Patient does have a 2.3 cm complex cyst on the right ovary that could represent an old endometrioma, but given appearance would warrant follow-up investigation.  Unsure this is related to her current symptoms.  Will get CA-125 and repeat US in 6 weeks.  If persistent and stable plan to discuss surgical removal by benign GYN, if larger, referral to Onc.  2) Bowel changes/Bloating: Referral to GI made today given bowel changes s/p colonoscopy    Fauzia Ng MD

## 2018-06-26 NOTE — PROGRESS NOTES
57 year old female presents for gynecologic ultrasound indicated by pelvic pain.  This study was done transvaginally.    Uterine findings:   Presence: Visible Size: Normal 7.6 x 4.9 x 4.1 cm.  Endometrium = 2.8 mm.   Cx length = 31.9 mm.      Flexion:  Anteverted Position: Deviated left Margins: Normal Shape: Abnormal   Contour: Irregular Texture: Heterogeneous Cavity: Normal Masses: Abnormal- TREY/ left myoma:3.6 x 3.8 x 3.2cm    Pelvic findings:    Right Adnexa: Normal   Left Adnexa: Normal   Bladder:  Normal         Cul - de - sac fluid: None    Ovarian follicles:   Right ovary:  3.8 x 2.5 x 1.8cm.     1 hypoechoic mass with internal hyperechoic area without increased blood flow.      Size(s):  2.5 x 2.5 x 1.6cm     Left ovary:  3.9 x 1.7 x 1.9cm.     0 follicles           Comments:  Complex cyst in right ovary, recommend repeat ultrasound in 6-8 weeks to assess for resolution.  Results reviewed with patient by Dr. Ng today.    LJ Reina MD

## 2018-06-26 NOTE — PROGRESS NOTES
Gynecology Visit Note  6/26/18    Reason for visit: Follow-up US    S: Patient is a 56 yo nulligravid female who presents today to discuss US results completed for patient concerns of structural anomalies possibly causing pelvic pain.  Patient recently seen for concerns of this on 6/19/18 and was thinking it was mostly associated with chronic stiff muscles, but wanted to rule out other concerns.  We discussed getting an US to evaluate for any structural abnormalities that could be contributing to her pain and she was happy with this plan.  She had her US completed today and presents to discuss results.    Patient does states she has concerns about her stools.  She feels that in the last 2 weeks, she has had increased bloating/cramping and associated changes in the caliber of her stool.  She feels the quality of her stools has varied from diarrhea to hard small stools.  She is not quite sure what is causing these changes.    O:  Vitals:    06/26/18 1051   BP: 119/73   Pulse: 71        General: NAD, A&Ox3  Resp: Non-labored breathing    Pelvic US 6/26/18:  Uterine findings:                        Presence: Visible Size: Normal 7.6 x 4.9 x 4.1 cm.  Endometrium = 2.8 mm.                        Cx length = 31.9 mm.                            Flexion:  Anteverted              Position: Deviated left           Margins: Normal                   Shape: Abnormal                        Contour: Irregular                  Texture: Heterogeneous                   Cavity: Normal                      Masses: Abnormal- TREY/ left myoma:3.6 x 3.8 x 3.2cm     Pelvic findings:                         Right Adnexa: Normal                        Left Adnexa: Normal                        Bladder:  Normal                                                                                                             Cul - de - sac fluid: None     Ovarian follicles:                        Right ovary:  3.8 x 2.5 x 1.8cm.                            1 hypoechoic mass with internal hyperechoic area without increased blood flow.                            Size(s):  2.5 x 2.5 x 1.6cm                           Left ovary:  3.9 x 1.7 x 1.9cm.                           0 follicles    A/P: 58 yo nulligravid postmenopausal female presents to discuss results completed for concerns of Pelvic Pain  1) Pelvic pain: US today shows 3.8 cm myoma.  Endometrial stripe 2.8 mm and normal for postmenopausal female.  Patient does have a 2.3 cm complex cyst on the right ovary that could represent an old endometrioma, but given appearance would warrant follow-up investigation.  Unsure this is related to her current symptoms.  Will get CA-125 and repeat US in 6 weeks.  If persistent and stable plan to discuss surgical removal by benign GYN, if larger, referral to Onc.  2) Bowel changes/Bloating: Referral to GI made today given bowel changes s/p colonoscopy    Fauzia Ng MD

## 2018-07-05 ENCOUNTER — HOSPITAL ENCOUNTER (EMERGENCY)
Facility: CLINIC | Age: 58
Discharge: HOME | End: 2018-07-05
Attending: EMERGENCY MEDICINE | Admitting: EMERGENCY MEDICINE
Payer: COMMERCIAL

## 2018-07-05 VITALS
TEMPERATURE: 98.4 F | HEIGHT: 71 IN | DIASTOLIC BLOOD PRESSURE: 79 MMHG | WEIGHT: 160 LBS | OXYGEN SATURATION: 100 % | RESPIRATION RATE: 16 BRPM | SYSTOLIC BLOOD PRESSURE: 132 MMHG | HEART RATE: 89 BPM | BODY MASS INDEX: 22.4 KG/M2

## 2018-07-05 DIAGNOSIS — K59.00 CONSTIPATION, UNSPECIFIED CONSTIPATION TYPE: ICD-10-CM

## 2018-07-05 PROCEDURE — 25000132 ZZH RX MED GY IP 250 OP 250 PS 637: Performed by: PHYSICIAN ASSISTANT

## 2018-07-05 PROCEDURE — 25000125 ZZHC RX 250: Performed by: PHYSICIAN ASSISTANT

## 2018-07-05 PROCEDURE — 99283 EMERGENCY DEPT VISIT LOW MDM: CPT

## 2018-07-05 RX ORDER — ONDANSETRON 4 MG/1
4 TABLET, ORALLY DISINTEGRATING ORAL ONCE
Status: COMPLETED | OUTPATIENT
Start: 2018-07-05 | End: 2018-07-05

## 2018-07-05 RX ADMIN — DOCUSATE SODIUM 286 ML: 10 LIQUID ORAL at 12:41

## 2018-07-05 RX ADMIN — ONDANSETRON 4 MG: 4 TABLET, ORALLY DISINTEGRATING ORAL at 12:40

## 2018-07-05 ASSESSMENT — ENCOUNTER SYMPTOMS
DIARRHEA: 1
VOMITING: 0
FREQUENCY: 0
CONSTIPATION: 1
FEVER: 0
ABDOMINAL PAIN: 1
NAUSEA: 1
DYSURIA: 0
CHILLS: 0
BLOOD IN STOOL: 0
SHORTNESS OF BREATH: 0
HEMATURIA: 0

## 2018-07-05 NOTE — ED AVS SNAPSHOT
Emergency Department    [address] [place] [address]    Phone:  [phone]    Fax:  [phone]                                       Casie Harris   MRN: [id number]    Department:   Emergency Department   Date of Visit:  7/5/2018           After Visit Summary Signature Page     I have received my discharge instructions, and my questions have been answered. I have discussed any challenges I see with this plan with the nurse or doctor.    ..........................................................................................................................................  Patient/Patient Representative Signature      ..........................................................................................................................................  Patient Representative Print Name and Relationship to Patient    ..................................................               ................................................  Date                                            Time    ..........................................................................................................................................  Reviewed by Signature/Title    ...................................................              ..............................................  Date                                                            Time

## 2018-07-05 NOTE — ED AVS SNAPSHOT
Emergency Department    [address] [facility] [address]    Phone:  [phone]    Fax:  [phone]                                       Casie Harris   MRN: [id number]    Department:   Emergency Department   Date of Visit:  7/5/2018           Patient Information     Date Of Birth          1960        Your diagnoses for this visit were:     Constipation, unspecified constipation type        You were seen by Delvin Munroe DO.      Follow-up Information     Follow up with Sherice Jamil MD PhD In 2 days.    Specialty:  Family Practice    Contact information:    [address]  [facility] [address] [phone]          Follow up with  Emergency Department.    Specialty:  EMERGENCY MEDICINE    Why:  If symptoms worsen    Contact information:    [address] [facility] [address] [phone]        Discharge Instructions         Constipation (Adult)  Constipation means that you have bowel movements that are less frequent than usual. Stools often become very hard and difficult to pass.  Constipation is very common. At some point in life it affects almost everyone. Since everyone's bowel habits are different, what is constipation to one person may not be to another. Your healthcare provider may do tests to diagnose constipation. It depends on what he or she finds when evaluating you.    Symptoms of constipation include:    Abdominal pain    Bloating    Vomiting    Painful bowel movements    Itching, swelling, bleeding, or pain around the anus  Causes  Constipation can have many causes. These include:    Diet low in fiber    Too much dairy    Not drinking enough liquids    Lack of exercise or physical activity. This is especially true for older adults.    Changes in lifestyle or daily routine, including pregnancy, aging, work, and travel    Frequent use or misuse of laxatives    Ignoring the urge to have a bowel movement or delaying it  until later    Medicines, such as certain prescription pain medicines, iron supplements, antacids, certain antidepressants, and calcium supplements    Diseases like irritable bowel syndrome, bowel obstructions, stroke, diabetes, thyroid disease, Parkinson disease, hemorrhoids, and colon cancer  Complications  Potential complications of constipation can include:    Hemorrhoids    Rectal bleeding from hemorrhoids or anal fissures (skin tears)    Hernias    Dependency on laxatives    Chronic constipation    Fecal impaction    Bowel obstruction or perforation  Home care  All treatment should be done after talking with your healthcare provider. This is especially true if you have another medical problems, are taking prescription medicines, or are an older adult. Treatment most often involves lifestyle changes. You may also need medicines. Your healthcare provider will tell you which will work best for you. Follow the advice below to help avoid this problem in the future.  Lifestyle changes  These lifestyle changes can help prevent constipation:    Diet. Eat a high-fiber diet, with fresh fruit and vegetables, and reduce dairy intake, meats, and processed foods    Fluids. It's important to get enough fluids each day. Drink plenty of water when you eat more fiber. If you are on diet that limits the amount of fluid you can have, talk about this with your healthcare provider.    Regular exercise. Check with your healthcare provider first.  Medicines  Take any medicines as directed. Some laxatives are safe to use only every now and then. Others can be taken on a regular basis. Talk with your doctor or pharmacist if you have questions.  Prescription pain medicines can cause constipation. If you are taking this kind of medicine, ask your healthcare provider if you should also take a stool softener.  Medicines you may take to treat constipation include:    Fiber supplements    Stool softeners    Laxatives    Enemas    Rectal  suppositories  Follow-up care  Follow up with your healthcare provider if symptoms don't get better in the next few days. You may need to have more tests or see a specialist.  Call 911  Call 911 if any of these occur:    Trouble breathing    Stiff, rigid abdomen that is severely painful to touch    Confusion    Fainting or loss of consciousness    Rapid heart rate    Chest pain  When to seek medical advice  Call your healthcare provider right away if any of these occur:    Fever of 100.4 F (38 C) or higher, or as directed by your healthcare provider    Failure to resume normal bowel movements    Pain in your abdomen or back gets worse    Nausea or vomiting    Swelling in your abdomen    Blood in the stool    Black, tarry stool    Involuntary weight loss    Weakness  Date Last Reviewed: 12/30/2015 2000-2017 [employer]. [address] [place] [address]. All rights reserved. This information is not intended as a substitute for professional medical care. Always follow your healthcare professional's instructions.          Your next 10 appointments already scheduled     Aug 09, 2018 10:30 AM CDT   ULTRASOUND with [facility] ULTRASOUND II   [facility] ([facility])    [address] [facility] [address]  [address] [place] [address]   [phone]            Aug 09, 2018 11:00 AM CDT   Return Visit with Fauzia Ng MD   [facility] ([facility])    [facility] [address]  [address] [place] [address]   [phone]            Oct 15, 2018 11:20 AM CDT   (Arrive by 11:05 AM)   New Patient Visit with Meño Calle MD   [facility] ([facility])    [address] [facility] [address]  4th [address]  [facility] [address] [phone]              24 Hour Appointment Hotline       To make an appointment at any [facility], call  [phone] ([phone]). If you don't have a family doctor or clinic, we will help you find one. [facility] clinics are conveniently located to serve the needs of you and your family.             Review of your medicines      Our records show that you are taking the medicines listed below. If these are incorrect, please call your family doctor or clinic.        Dose / Directions Last dose taken    Calcium-Magnesium 250-125 MG Tabs        (Chewables) take 4 daily   Refills:  0        clonazePAM 0.5 MG tablet   Commonly known as:  klonoPIN   Dose:  0.5 mg   Quantity:  10 tablet        Take 1 tablet (0.5 mg) by mouth 2 times daily as needed for anxiety   Refills:  0        estradiol 0.0375 MG/24HR BIW patch   Commonly known as:  VIVELLE-DOT   Dose:  1 patch   Quantity:  24 patch        Place 1 patch onto the skin twice a week   Refills:  3        mometasone 0.1 % cream   Commonly known as:  ELOCON        Refills:  0        polyethylene glycol powder   Commonly known as:  MIRALAX   Dose:  1 capful   Quantity:  510 g        Take 17 g (1 capful) by mouth daily   Refills:  1        PROBIOTIC DAILY PO        Take by mouth as needed   Refills:  0        progesterone 100 MG capsule   Commonly known as:  PROMETRIUM   Dose:  100 mg   Quantity:  90 capsule        Take 1 capsule (100 mg) by mouth daily   Refills:  3        vitamin D 2000 units Caps        Take 2 daily   Refills:  0                Orders Needing Specimen Collection     None      Pending Results     No orders found from 7/3/2018 to 7/6/2018.            Pending Culture Results     No orders found from 7/3/2018 to 7/6/2018.            Pending Results Instructions     If you had any lab results that were not finalized at the time of your Discharge, you can call the ED Lab Result RN at [phone]. You will be contacted by this team for any positive Lab results or changes in treatment. The nurses are available 7 days a week from 10A to 6:30P.  You can leave a  message 24 hours per day and they will return your call.        Test Results From Your Hospital Stay               Clinical Quality Measure: Blood Pressure Screening     Your blood pressure was checked while you were in the emergency department today. The last reading we obtained was  BP: 132/79 . Please read the guidelines below about what these numbers mean and what you should do about them.  If your systolic blood pressure (the top number) is less than 120 and your diastolic blood pressure (the bottom number) is less than 80, then your blood pressure is normal. There is nothing more that you need to do about it.  If your systolic blood pressure (the top number) is 120-139 or your diastolic blood pressure (the bottom number) is 80-89, your blood pressure may be higher than it should be. You should have your blood pressure rechecked within a year by a primary care provider.  If your systolic blood pressure (the top number) is 140 or greater or your diastolic blood pressure (the bottom number) is 90 or greater, you may have high blood pressure. High blood pressure is treatable, but if left untreated over time it can put you at risk for heart attack, stroke, or kidney failure. You should have your blood pressure rechecked by a primary care provider within the next 4 weeks.  If your provider in the emergency department today gave you specific instructions to follow-up with your doctor or provider even sooner than that, you should follow that instruction and not wait for up to 4 weeks for your follow-up visit.        Thank you for choosing [facility]       Thank you for choosing [facility] for your care. Our goal is always to provide you with excellent care. Hearing back from our patients is one way we can continue to improve our services. Please take a few minutes to complete the written survey that you may receive in the mail after you visit with us. Thank you!        [facility] Information     [facility] gives you secure  access to your electronic health record. If you see a primary care provider, you can also send messages to your care team and make appointments. If you have questions, please call your primary care clinic.  If you do not have a primary care provider, please call [phone] and they will assist you.        Care EveryWhere ID     This is your Care EveryWhere ID. This could be used by other organizations to access your [place] medical records  [id number]        Equal Access to Services     AMBREEN FERRARI : Aliya nettleso Sojeremi, wabola hollis, qasachata kaalmalisa carcamo, jaswindre webber. So [facility] [phone].    ATENCIÓN: Si habla español, tiene a paez disposición servicios gratuitos de asistencia lingüística. Llame al [phone].    We comply with applicable federal civil rights laws and [place] laws. We do not discriminate on the basis of race, color, national origin, age, disability, sex, sexual orientation, or gender identity.            After Visit Summary       This is your record. Keep this with you and show to your community pharmacist(s) and doctor(s) at your next visit.

## 2018-07-05 NOTE — ED PROVIDER NOTES
"  History     Chief Complaint:  Abdominal pain    HPI   Casie Harris is a 57 year old female who presents with constipation and intermittent abdominal discomfort for the past week.  The patient notes that she has been having abnormal bowel movements since her colonoscopy this past May.  She initially noticed intermittent constipation and diarrhea as well as abdominal bloating.  For approximately the past few weeks, she feels that she has not had a full bowel movement.  She does note that she has passed small stools each day that are loose.  She denies blood in her stools or black stools.  The patient had an episode of lower abdominal pain that she described as \"burning.\"  She was seen by her OB/GYN on 6/26/18 and told she had a cyst on her right ovary.  She was also referred for GI evaluation.  She has not yet been seen by gastroenterology.  She was also seen at urgent care later that day.  Per chart review laboratory analysis and STD testing was completed.  No abnormalities were seen.  Urinalysis was also negative.  Today, the patient has no abdominal pain but notes increased concern of constipation.  She has tried Miralax and dulcolax earlier this week without relief of symptoms.  She denies urinary frequency, urgency, hematuria.  The patient is postmenopausal.  Denies previous surgeries to her abdomen.  Denies history of irritable bowel syndrome or inflammatory bowel disease.    Of note, the patient had a polyp removed at her coloscopy on 5/04/18. 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 She had another colonoscopy 5 years prior and had four polyps removed.     Allergies:  NKDA     Medications:    Klonopin  Estradiol  Mometason  Miralax  Probiotic  Progesterone    Past Medical History:    Somatic dysfunction of pelvis region  HSV-1 infection  Tinnitus  Lichen sclerosus et atrophicus  BECCA  Arthritis  Autoimmune disease  Colon polyps     Past Surgical History:    Colonoscopy  Dilation " "and curettage  Laser CO2 Cervix    Family History:    Prostate cancer  Heart disease  Cancer: BCC  Cerebrovascular disease  Neurologic disorder  Suicide  Heart Disease: Maternal Grandfather, Paternal Grandmother  Breast Cancer  Depression    Social History:  Negative for tobacco use.  Negative for alcohol use.  Marital Status:        Review of Systems   Constitutional: Negative for chills and fever.   Respiratory: Negative for shortness of breath.    Cardiovascular: Negative for chest pain.   Gastrointestinal: Positive for abdominal pain (this past week, none today), constipation, diarrhea and nausea. Negative for blood in stool and vomiting.   Genitourinary: Negative for dysuria, frequency and hematuria.   All other systems reviewed and are negative.    Physical Exam   First Vitals:  BP: 132/79  Pulse: 89  Heart Rate: 89  Temp: 98.4  F (36.9  C)  Resp: 16  Height: 180.3 cm (5' 11\")  Weight: 72.6 kg (160 lb)  SpO2: 100 %    Physical Exam  General: Well appearing, well nourished. Normal mood and affect.  Skin: Good turgor, no rash, no unusual bruising or prominent lesions.  HEENT: Head: Normocephalic, atraumatic, no visible masses.  Eyes: Visual acuity intact, conjunctiva clear, sclera non-icteric, EOM intact.  Cardiac: No cardiomegaly or thrills; normal rate and regular rhythm, no murmur or gallop.   Lungs: Clear to auscultation and percussion   Abdomen: Bowel sounds normal, no tenderness, organomegaly, masses, or hernia. No guarding or rebound tenderness.   Musculoskeletal: Normal gait and station.   Neurologic: Oriented x 3.  Psychiatric: Intact recent and remote memory, judgment and insight, normal mood and affect.     Emergency Department Course   Interventions:  1240 Zofran, 4 mg PO  1241 Pink Lady enema, 286 mL, rectally given    Emergency Department Course:  1204 Nursing notes and vitals reviewed.  I performed an exam of the patient as documented above.     Medicine administered as documented " above.    1300 I rechecked the patient and discussed the results of her workup thus far.     Findings and plan explained to the Patient. Patient discharged home with instructions regarding supportive care, medications, and reasons to return. The importance of close follow-up was reviewed.     I personally answered all related questions prior to discharge.   Impression & Plan    Medical Decision Making:  Casie Harris is a 57-year-old female who presented to the ED today for evaluation of constipation.  Further details of the patient's history can be noted in the HPI.  On my exam today, the patient had no abdominal tenderness with palpation.  Bowel sounds were normal.  She has been able to pass stool and gas.  I do not feel that more advanced imaging studies or laboratory analysis needed to be completed at this time.  I have low suspicion for obstruction or other pathology.  Laboratory analysis completed a week ago with urgent care was also all within normal limits.  Interventions here included a pink lady enema and zofran.  Patient was able to pass stool after the enema and her nausea was controlled. After these interventions and an improvement with symptoms, I felt comfortable with her discharge.  I feel that her symptoms are due to persistent constipation.  She was advised to take over-the-counter magnesium citrate and follow-up with your primary care provider within the next few days if her symptoms are persisting.  The patient should return to the ED if her abdominal pain worsens, blood is found in the stool, high fever develops, or new concerns arise.  All questions were answered prior to the patient's discharge.  She was in agreement with the plan stated above.    Critical Care time:  none    Diagnosis:    ICD-10-CM    1. Constipation, unspecified constipation type K59.00        Disposition:  discharged to home    Scribe Disclosure:  I, Beth Flaherty, am serving as a scribe on 7/5/2018 at 12:04 PM to  personally document services performed by CARMEN Kuo based on my observations and the provider's statements to me.     Beth Flaherty  7/5/2018    EMERGENCY DEPARTMENT       Patricia Simons PA  07/05/18 2355

## 2018-07-05 NOTE — ED PROVIDER NOTES
Emergency Department Attending Supervision Note  7/5/2018  1:16 PM    I evaluated this patient in conjunction with AHBAY Kuo    Briefly, the patient presented with concern of constipation.  The patient reports that she had a colonoscopy 1-1/2 months ago.  She had one polyp removed at that time.  She denies any blood in her stool.  She has not had any diarrhea, although the patient reports a few small stools that she has had have been a little bit loose.  Patient tried Dulcolax and MiraLAX.  These did not help very much.  The patient denies any abdominal pain but does admit to abdominal fullness consistent with constipation.    On my exam:  Physical Exam   General:  Sitting on bed.   HENT:  No obvious trauma to head  Right Ear:  External ear normal.   Left Ear:  External ear normal.   Nose:  Nose normal.   Eyes:  Conjunctivae and EOM are normal. Pupils are equal, round, and reactive.   Neck: Normal range of motion. Neck supple. No tracheal deviation present.   CV:  Normal heart sounds. No murmur heard.  Pulm/Chest: Effort normal and breath sounds normal.   Abd: Soft. No distension. There is no tenderness.  Negative Jorgensen's and negative McBurney's.  There is no rigidity, no rebound and no guarding.   M/S: Normal range of motion.   Neuro: Alert. GCS 15.  Skin: Skin is warm and dry. No rash noted. Not diaphoretic.   Psych: Normal mood and affect. Behavior is normal.     Brief MDM:  Casie Harris is a very pleasant 57 year old year old female who presents to the emergency department with concern of constipation.  The patient reports that prior to her colonoscopy a month and a half ago the patient's bowels were very regular.  The patient reports since the colonoscopy her bowels have not been regular.  The patient has abdominal fullness but no specific abdominal pain.  Patient's abdominal exam is completely benign.  There is no tenderness to suggest appendicitis, cholecystitis, diverticulitis, small  bowel obstruction, ischemia, Crohn's or ulcerative colitis etc.  The patient reports her colonoscopy did not show signs of Crohn's or ulcerative colitis.  The patient received the pink lady enema here and had some results.  I also advised her to take magnesium citrate along with plenty of water when she gets home.  The patient will return if she develops any abdominal pain or fevers.  We discussed there could certainly be other pathology present is not clearly evident at this time.    The treatment plan was discussed with the patient and they expressed understanding of this plan and consented to the plan.  In addition, the patient will return to the emergency department if their symptoms persist, worsen, if new symptoms arise or if there is any concern as other pathology may be present that is not evident at this time. They also understand the importance of close follow up in the clinic and if unable to do so will return to the emergency department for a reevaluation. All questions were answered.    My Impression and diagnosis:    ICD-10-CM    1. Constipation, unspecified constipation type K59.00          DO Ludwig Anna Robert James, DO  07/05/18 1325

## 2018-07-05 NOTE — DISCHARGE INSTRUCTIONS
Try taking magnesium citrate when you get home. Drink plenty of water with it.    Constipation (Adult)  Constipation means that you have bowel movements that are less frequent than usual. Stools often become very hard and difficult to pass.  Constipation is very common. At some point in life it affects almost everyone. Since everyone's bowel habits are different, what is constipation to one person may not be to another. Your healthcare provider may do tests to diagnose constipation. It depends on what he or she finds when evaluating you.    Symptoms of constipation include:    Abdominal pain    Bloating    Vomiting    Painful bowel movements    Itching, swelling, bleeding, or pain around the anus  Causes  Constipation can have many causes. These include:    Diet low in fiber    Too much dairy    Not drinking enough liquids    Lack of exercise or physical activity. This is especially true for older adults.    Changes in lifestyle or daily routine, including pregnancy, aging, work, and travel    Frequent use or misuse of laxatives    Ignoring the urge to have a bowel movement or delaying it until later    Medicines, such as certain prescription pain medicines, iron supplements, antacids, certain antidepressants, and calcium supplements    Diseases like irritable bowel syndrome, bowel obstructions, stroke, diabetes, thyroid disease, Parkinson disease, hemorrhoids, and colon cancer  Complications  Potential complications of constipation can include:    Hemorrhoids    Rectal bleeding from hemorrhoids or anal fissures (skin tears)    Hernias    Dependency on laxatives    Chronic constipation    Fecal impaction    Bowel obstruction or perforation  Home care  All treatment should be done after talking with your healthcare provider. This is especially true if you have another medical problems, are taking prescription medicines, or are an older adult. Treatment most often involves lifestyle changes. You may also need  medicines. Your healthcare provider will tell you which will work best for you. Follow the advice below to help avoid this problem in the future.  Lifestyle changes  These lifestyle changes can help prevent constipation:    Diet. Eat a high-fiber diet, with fresh fruit and vegetables, and reduce dairy intake, meats, and processed foods    Fluids. It's important to get enough fluids each day. Drink plenty of water when you eat more fiber. If you are on diet that limits the amount of fluid you can have, talk about this with your healthcare provider.    Regular exercise. Check with your healthcare provider first.  Medicines  Take any medicines as directed. Some laxatives are safe to use only every now and then. Others can be taken on a regular basis. Talk with your doctor or pharmacist if you have questions.  Prescription pain medicines can cause constipation. If you are taking this kind of medicine, ask your healthcare provider if you should also take a stool softener.  Medicines you may take to treat constipation include:    Fiber supplements    Stool softeners    Laxatives    Enemas    Rectal suppositories  Follow-up care  Follow up with your healthcare provider if symptoms don't get better in the next few days. You may need to have more tests or see a specialist.  Call 911  Call 911 if any of these occur:    Trouble breathing    Stiff, rigid abdomen that is severely painful to touch    Confusion    Fainting or loss of consciousness    Rapid heart rate    Chest pain  When to seek medical advice  Call your healthcare provider right away if any of these occur:    Fever of 100.4 F (38 C) or higher, or as directed by your healthcare provider    Failure to resume normal bowel movements    Pain in your abdomen or back gets worse    Nausea or vomiting    Swelling in your abdomen    Blood in the stool    Black, tarry stool    Involuntary weight loss    Weakness  Date Last Reviewed: 12/30/2015 2000-2017 [employer]. [address] [place] [address], [place] [address]. All rights reserved. This information is not intended as a substitute for professional medical care. Always follow your healthcare professional's instructions.

## 2018-07-16 PROBLEM — R10.2 PELVIC PAIN IN FEMALE: Status: RESOLVED | Noted: 2018-06-01 | Resolved: 2018-07-16

## 2018-07-16 PROBLEM — M99.05 SOMATIC DYSFUNCTION OF PELVIS REGION: Status: RESOLVED | Noted: 2018-06-01 | Resolved: 2018-07-16

## 2018-08-01 ENCOUNTER — RADIANT APPOINTMENT (OUTPATIENT)
Dept: ULTRASOUND IMAGING | Facility: CLINIC | Age: 58
End: 2018-08-01
Attending: OBSTETRICS & GYNECOLOGY
Payer: COMMERCIAL

## 2018-08-01 ENCOUNTER — OFFICE VISIT (OUTPATIENT)
Dept: OBGYN | Facility: CLINIC | Age: 58
End: 2018-08-01
Attending: OBSTETRICS & GYNECOLOGY
Payer: COMMERCIAL

## 2018-08-01 VITALS
SYSTOLIC BLOOD PRESSURE: 115 MMHG | HEART RATE: 76 BPM | WEIGHT: 156.6 LBS | DIASTOLIC BLOOD PRESSURE: 76 MMHG | BODY MASS INDEX: 21.84 KG/M2

## 2018-08-01 DIAGNOSIS — N83.291 COMPLEX CYST OF RIGHT OVARY: Primary | ICD-10-CM

## 2018-08-01 DIAGNOSIS — R10.2 PELVIC PAIN IN FEMALE: ICD-10-CM

## 2018-08-01 DIAGNOSIS — N83.201 CYST OF RIGHT OVARY: ICD-10-CM

## 2018-08-01 PROCEDURE — G0463 HOSPITAL OUTPT CLINIC VISIT: HCPCS | Mod: ZF

## 2018-08-01 PROCEDURE — 76830 TRANSVAGINAL US NON-OB: CPT

## 2018-08-01 NOTE — NURSING NOTE
Chief Complaint   Patient presents with     Follow Up For     Follow up uls and lab results       See NAVDEEP Meléndez 8/1/2018

## 2018-08-01 NOTE — PROGRESS NOTES
Gynecology Visit Note  8/1/2018    Reason for visit: Follow-up US    S: Patient is a 56 yo nulligravid female who presents today to discuss US results completed for pelvic pain and right complex ovarian cyst found on ultrasound last visit.     She has no other c/o today.     O:  Vitals:    08/01/18 1415   BP: 115/76   BP Location: Left arm   Patient Position: Chair   Pulse: 76   Weight: 71 kg (156 lb 9.6 oz)        General: NAD, A&Ox3  Resp: Non-labored breathing    Pelvic US 8/1/18:  Uterine findings:                        Presence: Visible Size: Normal, myomas as seen on previous.  Endometrium = 2.2 mm.      Pelvic findings:                         Right Adnexa: Normal                        Left Adnexa: Normal                        Bladder:  Normal                                                                                                             Cul - de - sac fluid: small amount      Ovarian follicles:                        Right ovary:  2.2x2.2x1.7cm.                            Complex cyst with internal echoes and echogenic area. No color flow is seen.                             Size(s):  2.5 x 2.9 x 1.9cm                  Hypoechoic area with internal echoes - 1.4 x 1.3 x 1.2cm                            Left ovary:  2.8x1.2x1.4cm.                            0 follicles      Comments:  Slight increase in complex right ovarian cyst. Further evaluation recommended in postmenopausal female.       A/P: 56 yo nulligravid postmenopausal female presents to discuss results of repeat US for right ovarian complex cyst and pelvic pain  1) Pelvic pain: US today with slight increase in size compared to ultrasound 6 weeks ago and patient still with pelvic pain. CA-125 was 7. Discussed would recommend removal either by benign GYN team or gyn onc. Discussed if removal by benign gyn, could potentially need second staging procedure if cancer (although less likely with normal CA-125). Given appearance on US and  growth, may benefit from gyn onc consultation first. She would like to proceed with gyn onc consultation. Will place referral.     Destiny Bernstein MD  8/1/2018

## 2018-08-01 NOTE — MR AVS SNAPSHOT
After Visit Summary   8/1/2018    Casie Harris    MRN: [id number]           Patient Information     Date Of Birth          1960        Visit Information        Provider Department      8/1/2018 2:15 PM Destiny Bernstein MD [facility]        Today's Diagnoses     Complex cyst of right ovary    -  1    Pelvic pain in female           Follow-ups after your visit        Your next 10 appointments already scheduled     Aug 29, 2018 12:00 PM CDT   (Arrive by 11:45 AM)   New Patient Visit with Marty Rodrigues MD   [facility] ([facility])    [address]  [address]  [place] [id number] [phone]            Oct 15, 2018 11:20 AM CDT   (Arrive by 11:05 AM)   New Patient Visit with Meño Calle MD   [facility] ([facility])    [address]  [address]th [address]  [place] [id number] [phone]              Who to contact     Please call your clinic at [phone] to:    Ask questions about your health    Make or cancel appointments    Discuss your medicines    Learn about your test results    Speak to your doctor            Additional Information About Your Visit        [id number] Information     [email or web address] gives you secure access to your electronic health record. If you see a primary care provider, you can also send messages to your care team and make appointments. If you have questions, please call your primary care clinic.  If you do not have a primary care provider, please call [phone] and they will assist you.      [email or web address] is an electronic gateway that provides easy, online access to your medical records. With [email or web address], you can request a clinic appointment, read your test results, renew a prescription or communicate with your care team.     To access your existing account, please contact your [facility] or call  [phone] for assistance.        Care EveryWhere ID     This is your Care EveryWhere ID. This could be used by other organizations to access your [facility] medical records  [id number]        Your Vitals Were     Pulse Last Period Breastfeeding? BMI (Body Mass Index)          76 03/25/2012 No 21.84 kg/m2         Blood Pressure from Last 3 Encounters:   08/01/18 115/76   07/05/18 132/79   06/26/18 119/73    Weight from Last 3 Encounters:   08/01/18 71 kg (156 lb 9.6 oz)   07/05/18 72.6 kg (160 lb)   06/19/18 73.9 kg (163 lb)              Today, you had the following     No orders found for display       Primary Care Provider Office Phone # Fax #    Sherice Jamil MD PhD [phone]       [address] [place] [id number]        Equal Access to Services     [facility]: Hadii sara goel hadasho Sojeanieali, waaxda luqadaha, qaybta kaalmada adeegyada, jaswinder smyth haysabina vora . So [facility] [phone].    ATENCIÓN: Si habla español, tiene a paez disposición servicios gratuitos de asistencia lingüística. Llame al [phone].    We comply with applicable federal civil rights laws and [place] laws. We do not discriminate on the basis of race, color, national origin, age, disability, sex, sexual orientation, or gender identity.            Thank you!     Thank you for choosing [facility]  for your care. Our goal is always to provide you with excellent care. Hearing back from our patients is one way we can continue to improve our services. Please take a few minutes to complete the written survey that you may receive in the mail after your visit with us. Thank you!             Your Updated Medication List - Protect others around you: Learn how to safely use, store and throw away your medicines at www.disposemymeds.org.          This list is accurate as of 8/1/18  9:39 PM.  Always use your most recent med list.                   Brand Name Dispense Instructions  for use Diagnosis    Calcium-Magnesium 250-125 MG Tabs      (Chewables) take 4 daily        clonazePAM 0.5 MG tablet    klonoPIN    10 tablet    Take 1 tablet (0.5 mg) by mouth 2 times daily as needed for anxiety    BECCA (generalized anxiety disorder)       estradiol 0.0375 MG/24HR BIW patch    VIVELLE-DOT    24 patch    Place 1 patch onto the skin twice a week    Vasomotor symptoms due to menopause       HERBALS      Chinese Herbal supplement: Candie Quiroz Helps with stress and anxiety        mometasone 0.1 % cream    ELOCON          PROBIOTIC DAILY PO      Take by mouth as needed        progesterone 100 MG capsule    PROMETRIUM    90 capsule    Take 1 capsule (100 mg) by mouth daily    Vasomotor symptoms due to menopause       vitamin D 2000 units Caps      Take 2 daily

## 2018-08-01 NOTE — LETTER
8/1/2018       RE: Casie Harris  [address]  [place] [address]     Dear Colleague,    Thank you for referring your patient, Casie Harris, to the [facility] at [facility]. Please see a copy of my visit note below.    Gynecology Visit Note  8/1/2018    Reason for visit: Follow-up US    S: Patient is a 58 yo nulligravid female who presents today to discuss US results completed for pelvic pain and right complex ovarian cyst found on ultrasound last visit.     She has no other c/o today.     O:  Vitals:    08/01/18 1415   BP: 115/76   BP Location: Left arm   Patient Position: Chair   Pulse: 76   Weight: 71 kg (156 lb 9.6 oz)        General: NAD, A&Ox3  Resp: Non-labored breathing    Pelvic US 8/1/18:  Uterine findings:                        Presence: Visible Size: Normal, myomas as seen on previous.  Endometrium = 2.2 mm.      Pelvic findings:                         Right Adnexa: Normal                        Left Adnexa: Normal                        Bladder:  Normal                                                                                                             Cul - de - sac fluid: small amount      Ovarian follicles:                        Right ovary:  2.2x2.2x1.7cm.                            Complex cyst with internal echoes and echogenic area. No color flow is seen.                             Size(s):  2.5 x 2.9 x 1.9cm                  Hypoechoic area with internal echoes - 1.4 x 1.3 x 1.2cm                            Left ovary:  2.8x1.2x1.4cm.                            0 follicles      Comments:  Slight increase in complex right ovarian cyst. Further evaluation recommended in postmenopausal female.       A/P: 58 yo nulligravid postmenopausal female presents to discuss results of repeat US for right ovarian complex cyst and pelvic pain  1) Pelvic pain: US today with slight increase in size compared to  ultrasound 6 weeks ago and patient still with pelvic pain. CA-125 was 7. Discussed would recommend removal either by benign GYN team or gyn onc. Discussed if removal by benign gyn, could potentially need second staging procedure if cancer (although less likely with normal CA-125). Given appearance on US and growth, may benefit from gyn onc consultation first. She would like to proceed with gyn onc consultation. Will place referral.       Again, thank you for allowing me to participate in the care of your patient.      Sincerely,    Destiny Bernstein MD

## 2018-08-02 ENCOUNTER — MYC MEDICAL ADVICE (OUTPATIENT)
Dept: FAMILY MEDICINE | Facility: CLINIC | Age: 58
End: 2018-08-02

## 2018-08-02 DIAGNOSIS — F41.1 GAD (GENERALIZED ANXIETY DISORDER): Primary | ICD-10-CM

## 2018-08-02 RX ORDER — CITALOPRAM HYDROBROMIDE 10 MG/1
TABLET ORAL
Qty: 60 TABLET | Refills: 1 | Status: SHIPPED | OUTPATIENT
Start: 2018-08-02 | End: 2018-08-03

## 2018-08-03 DIAGNOSIS — F41.1 GAD (GENERALIZED ANXIETY DISORDER): Primary | ICD-10-CM

## 2018-08-03 RX ORDER — ESCITALOPRAM OXALATE 10 MG/1
TABLET ORAL
Qty: 90 TABLET | Refills: 1 | Status: SHIPPED | OUTPATIENT
Start: 2018-08-03 | End: 2019-06-21

## 2018-08-06 DIAGNOSIS — F41.1 GAD (GENERALIZED ANXIETY DISORDER): ICD-10-CM

## 2018-08-06 RX ORDER — CLONAZEPAM 0.5 MG/1
0.5 TABLET ORAL 2 TIMES DAILY PRN
Qty: 30 TABLET | Refills: 0 | COMMUNITY
Start: 2018-08-06 | End: 2018-10-03

## 2018-08-06 NOTE — TELEPHONE ENCOUNTER
Patient requesting refill of Klonopin. Approved by Dr. Jamil in clinic. Nurse called into pharmacy.

## 2018-08-08 PROBLEM — M54.50 LUMBAGO: Status: RESOLVED | Noted: 2018-04-13 | Resolved: 2018-08-08

## 2018-08-08 NOTE — PROGRESS NOTES
Subjective:  HPI                    Objective:  System    Physical Exam    General     ROS    Assessment/Plan:    DISCHARGE REPORT    Progress reporting period is from 4-13-18 to 5-23-18.       SUBJECTIVE    Subjective: Reports she is still improving had a weekend of relaxation and felt very good, knows her tension increases her pain.     Current Pain level: 2/10.     Previous pain level was  3/10  .   Changes in function:  Yes (See Goal flowsheet attached for changes in current functional level)  Adverse reaction to treatment or activity: None    OBJECTIVE  Changes noted in objective findings:  Patient has failed to return to therapy so current objective findings are unknown. and The objective findings below are from DOS 5-23-18.  Objective: Pain is tingling in LB and rectal area and vagina. Has some good days and some days when she hurts more. Has only done a little gardening that was ok. Took off for a weekend and felt really good. Biking 3-4x for 25 min feels good afterwards.Walks about 30-40 inutes that gets fatigue in her back at the end.AROM flexion feels like a pull in her LB. Slight pain with PA over L4-5 . BKTC still hurts in low lumbar. Decided to add back her stretches but not BKTC. Still avoid gardening flexion. Discussed tension in the pelvic floor and relationship to pain..See in 2 weeks. Pt did not return for further PT    ASSESSMENT/PLAN  Updated problem list and treatment plan: Diagnosis 1:  LBP    STG/LTGs have been met or progress has been made towards goals:  Yes (See Goal flow sheet completed today.)  Assessment of Progress: The patient has not returned to therapy. Current status is unknown.  Self Management Plans:  Patient has been instructed in a home treatment program.    Casie continues to require the following intervention to meet STG and LTG's:  NA    Recommendations:  DC PT    Please refer to the daily flowsheet for treatment today, total treatment time and time spent performing 1:1 timed  codes.

## 2018-08-24 DIAGNOSIS — N95.1 VASOMOTOR SYMPTOMS DUE TO MENOPAUSE: ICD-10-CM

## 2018-08-24 RX ORDER — ESTRADIOL 0.04 MG/D
1 PATCH, EXTENDED RELEASE TRANSDERMAL
Qty: 24 PATCH | Refills: 2 | Status: SHIPPED | OUTPATIENT
Start: 2018-08-27 | End: 2019-05-22

## 2018-08-24 NOTE — TELEPHONE ENCOUNTER
Received refill request for estradiol patch and progesterone.  Last in clinic 4/2018 where hormone therapy was to be continued at current dosages. Rx sent.

## 2018-08-27 ASSESSMENT — ENCOUNTER SYMPTOMS
ABDOMINAL PAIN: 1
PARALYSIS: 0
WEIGHT LOSS: 1
NAUSEA: 0
MUSCLE CRAMPS: 0
BACK PAIN: 1
VOMITING: 0
NERVOUS/ANXIOUS: 1
JOINT SWELLING: 0
DECREASED LIBIDO: 1
NUMBNESS: 0
JAUNDICE: 0
NIGHT SWEATS: 0
DISTURBANCES IN COORDINATION: 0
SORE THROAT: 0
DECREASED APPETITE: 0
SINUS PAIN: 0
POLYPHAGIA: 0
INSOMNIA: 0
TREMORS: 0
BLOATING: 0
CHILLS: 0
DIARRHEA: 0
WEAKNESS: 0
DECREASED CONCENTRATION: 0
ARTHRALGIAS: 1
FATIGUE: 0
HALLUCINATIONS: 0
HOT FLASHES: 0
CONSTIPATION: 0
SEIZURES: 0
SINUS CONGESTION: 0
TROUBLE SWALLOWING: 0
MUSCLE WEAKNESS: 0
BOWEL INCONTINENCE: 0
SPEECH CHANGE: 0
HEARTBURN: 0
NECK PAIN: 0
PANIC: 0
ALTERED TEMPERATURE REGULATION: 0
TASTE DISTURBANCE: 0
MYALGIAS: 1
WEIGHT GAIN: 0
LOSS OF CONSCIOUSNESS: 0
BLOOD IN STOOL: 0
FEVER: 0
STIFFNESS: 1
TINGLING: 1
INCREASED ENERGY: 0
DIZZINESS: 0
SMELL DISTURBANCE: 0
MEMORY LOSS: 0
NECK MASS: 0
POLYDIPSIA: 0
HEADACHES: 0
DEPRESSION: 0
HOARSE VOICE: 0
RECTAL PAIN: 0

## 2018-08-28 ENCOUNTER — ONCOLOGY VISIT (OUTPATIENT)
Dept: ONCOLOGY | Facility: CLINIC | Age: 58
End: 2018-08-28
Attending: OBSTETRICS & GYNECOLOGY
Payer: COMMERCIAL

## 2018-08-28 VITALS
HEART RATE: 75 BPM | WEIGHT: 151 LBS | OXYGEN SATURATION: 99 % | SYSTOLIC BLOOD PRESSURE: 116 MMHG | TEMPERATURE: 97.6 F | DIASTOLIC BLOOD PRESSURE: 73 MMHG | BODY MASS INDEX: 21.14 KG/M2 | HEIGHT: 71 IN

## 2018-08-28 DIAGNOSIS — M54.5 CHRONIC LOW BACK PAIN, UNSPECIFIED BACK PAIN LATERALITY, WITH SCIATICA PRESENCE UNSPECIFIED: Primary | ICD-10-CM

## 2018-08-28 DIAGNOSIS — G89.29 CHRONIC LOW BACK PAIN, UNSPECIFIED BACK PAIN LATERALITY, WITH SCIATICA PRESENCE UNSPECIFIED: Primary | ICD-10-CM

## 2018-08-28 PROCEDURE — G0463 HOSPITAL OUTPT CLINIC VISIT: HCPCS | Mod: ZF

## 2018-08-28 PROCEDURE — 99205 OFFICE O/P NEW HI 60 MIN: CPT | Mod: ZP | Performed by: OBSTETRICS & GYNECOLOGY

## 2018-08-28 RX ORDER — IBUPROFEN 600 MG/1
600 TABLET, FILM COATED ORAL
COMMUNITY
Start: 2018-08-21 | End: 2018-10-02

## 2018-08-28 RX ORDER — OXYCODONE AND ACETAMINOPHEN 5; 325 MG/1; MG/1
1 TABLET ORAL
COMMUNITY
Start: 2018-08-21 | End: 2019-05-22

## 2018-08-28 ASSESSMENT — PAIN SCALES - GENERAL: PAINLEVEL: MODERATE PAIN (4)

## 2018-08-28 NOTE — MR AVS SNAPSHOT
After Visit Summary   8/28/2018    Casie Harris    MRN: [id number]           Patient Information     Date Of Birth          1960        Visit Information        Provider Department      8/28/2018 3:00 PM Maia Brown MD [facility]        Today's Diagnoses     Chronic low back pain, unspecified back pain laterality, with sciatica presence unspecified    -  1       Follow-ups after your visit        Additional Services     ORTHO  REFERRAL       [facility] is referring you to the Orthopedic  Services at [facility].       The  Representative will assist you in the coordination of your Orthopedic and Musculoskeletal Care as prescribed by your physician.    The  Representative will call you within 1 business day to help schedule your appointment, or you may contact the  Representative at:    All areas ~ [phone]     Type of Referral : Chronic back pain, patient would like opinion on management. Prefers to avoid MRI if possible       Timeframe requested: Routine    Coverage of these services is subject to the terms and limitations of your health insurance plan.  Please call member services at your health plan with any benefit or coverage questions.      If X-rays, CT or MRI's have been performed, please contact the facility where they were done to arrange for , prior to your scheduled appointment.  Please bring this referral request to your appointment and present it to your specialist.                  Your next 10 appointments already scheduled     Oct 02, 2018  8:40 AM CDT   New Visit with Samir Hutton PA-C   [facility] ([facility])    [address] [place] [address]   [phone]            Oct 15, 2018 11:20 AM CDT   (Arrive by 11:05 AM)   New Patient Visit with Meño Calle MD   [facility]  "Gastroenterology and IBD Clinic ([facility])    [address]  4th Floor  [facility] [address] [phone]              Who to contact     If you have questions or need follow up information about today's clinic visit or your schedule please contact [facility] directly at [phone].  Normal or non-critical lab and imaging results will be communicated to you by MyChart, letter or phone within 4 business days after the clinic has received the results. If you do not hear from us within 7 days, please contact the clinic through [employer] or phone. If you have a critical or abnormal lab result, we will notify you by phone as soon as possible.  Submit refill requests through [employer] or call your pharmacy and they will forward the refill request to us. Please allow 3 business days for your refill to be completed.          Additional Information About Your Visit        [employer] Information     [employer] gives you secure access to your electronic health record. If you see a primary care provider, you can also send messages to your care team and make appointments. If you have questions, please call your primary care clinic.  If you do not have a primary care provider, please call [phone] and they will assist you.        Care EveryWhere ID     This is your Care EveryWhere ID. This could be used by other organizations to access your [facility] medical records  [id number]        Your Vitals Were     Pulse Temperature Height Last Period Pulse Oximetry BMI (Body Mass Index)    75 97.6  F (36.4  C) 1.803 m (5' 10.98\") 03/25/2012 99% 21.07 kg/m2       Blood Pressure from Last 3 Encounters:   08/28/18 116/73   08/01/18 115/76   07/05/18 132/79    Weight from Last 3 Encounters:   08/28/18 68.5 kg (151 lb)   08/01/18 71 kg (156 lb 9.6 oz)   07/05/18 72.6 kg (160 lb)              We Performed the Following     ORTHO  REFERRAL       Information about OPIOIDS     " PRESCRIPTION OPIOIDS: WHAT YOU NEED TO KNOW   We gave you an opioid (narcotic) pain medicine. It is important to manage your pain, but opioids are not always the best choice. You should first try all the other options your care team gave you. Take this medicine for as short a time (and as few doses) as possible.    Some activities can increase your pain, such as bandage changes or therapy sessions. It may help to take your pain medicine 30 to 60 minutes before these activities. Reduce your stress by getting enough sleep, working on hobbies you enjoy and practicing relaxation or meditation. Talk to your care team about ways to manage your pain beyond prescription opioids.    These medicines have risks:    DO NOT drive when on new or higher doses of pain medicine. These medicines can affect your alertness and reaction times, and you could be arrested for driving under the influence (DUI). If you need to use opioids long-term, talk to your care team about driving.    DO NOT operate heavy machinery    DO NOT do any other dangerous activities while taking these medicines.    DO NOT drink any alcohol while taking these medicines.     If the opioid prescribed includes acetaminophen, DO NOT take with any other medicines that contain acetaminophen. Read all labels carefully. Look for the word  acetaminophen  or  Tylenol.  Ask your pharmacist if you have questions or are unsure.    You can get addicted to pain medicines, especially if you have a history of addiction (chemical, alcohol or substance dependence). Talk to your care team about ways to reduce this risk.    All opioids tend to cause constipation. Drink plenty of water and eat foods that have a lot of fiber, such as fruits, vegetables, prune juice, apple juice and high-fiber cereal. Take a laxative (Miralax, milk of magnesia, Colace, Senna) if you don t move your bowels at least every other day. Other side effects include upset stomach, sleepiness, dizziness,  throwing up, tolerance (needing more of the medicine to have the same effect), physical dependence and slowed breathing.    Store your pills in a secure place, locked if possible. We will not replace any lost or stolen medicine. If you don t finish your medicine, please throw away (dispose) as directed by your pharmacist. The [place] Pollution Control Agency has more information about safe disposal: [email or web address]://[email or web address].[email or web address]/living-green/managing-unwanted-medications         Primary Care Provider Office Phone # Fax #    Sherice Jamil MD PhD [phone]       [address] [place] [address]  [facility] [address]        Equal Access to Services     AMBREEN FERRARI : Hadii sara nettleso Tristan, waaxda telma, qasachata kaalmada carlos alberto, jaswinder webber. [facility] [phone].    ATENCIÓN: Si habla español, tiene a paez disposición servicios gratuitos de asistencia lingüística. Ll[facility] [phone].    We comply with applicable federal civil rights laws and [place] laws. We do not discriminate on the basis of race, color, national origin, age, disability, sex, sexual orientation, or gender identity.            Thank you!     Thank you for choosing [facility]  for your care. Our goal is always to provide you with excellent care. Hearing back from our patients is one way we can continue to improve our services. Please take a few minutes to complete the written survey that you may receive in the mail after your visit with us. Thank you!             Your Updated Medication List - Protect others around you: Learn how to safely use, store and throw away your medicines at www.disposemymeds.org.          This list is accurate as of 8/28/18 11:59 PM.  Always use your most recent med list.                   Brand Name Dispense Instructions for use Diagnosis    Calcium-Magnesium 250-125 MG Tabs      (Chewables) take 4 daily        clonazePAM 0.5 MG tablet    klonoPIN     30 tablet    Take 1 tablet (0.5 mg) by mouth 2 times daily as needed for anxiety    BECCA (generalized anxiety disorder)       escitalopram 10 MG tablet    LEXAPRO    90 tablet    Start with 1/2 tablet daily and increase to 1 full tablet in 7-10 days.    BECCA (generalized anxiety disorder)       estradiol 0.0375 MG/24HR BIW patch    VIVELLE-DOT    24 patch    Place 1 patch onto the skin twice a week    Vasomotor symptoms due to menopause       HERBALS      Chinese Herbal supplement: Candie Quiroz Helps with stress and anxiety        ibuprofen 600 MG tablet    ADVIL/MOTRIN     Take 600 mg by mouth        mometasone 0.1 % cream    ELOCON          oxyCODONE-acetaminophen 5-325 MG per tablet    PERCOCET     Take 1 tablet by mouth        PROBIOTIC DAILY PO      Take by mouth as needed        progesterone 100 MG capsule    PROMETRIUM    90 capsule    Take 1 capsule (100 mg) by mouth daily    Vasomotor symptoms due to menopause       vitamin D 2000 units Caps      Take 2 daily

## 2018-08-28 NOTE — NURSING NOTE
"Oncology Rooming Note    August 28, 2018 2:59 PM   Casie Harris is a 57 year old female who presents for:    Chief Complaint   Patient presents with     Oncology Clinic Visit     NEW     Initial Vitals: /73  Pulse 75  Temp 97.6  F (36.4  C)  Ht 1.803 m (5' 10.98\")  Wt 68.5 kg (151 lb)  LMP 03/25/2012  SpO2 99%  BMI 21.07 kg/m2 Estimated body mass index is 21.07 kg/(m^2) as calculated from the following:    Height as of this encounter: 1.803 m (5' 10.98\").    Weight as of this encounter: 68.5 kg (151 lb). Body surface area is 1.85 meters squared.  Moderate Pain (4) Comment: Data Unavailable   Patient's last menstrual period was 03/25/2012.  Allergies reviewed: Yes  Medications reviewed: Yes    Medications: estrogen and progest  Pharmacy name entered into [employer]: [id number] - [place], [place] - [address] [place] [address] & [address]    Clinical concerns: new     6 minutes for nursing intake (face to face time)     Natasha Rosario RN              "

## 2018-08-28 NOTE — LETTER
2018       RE: Casie Harris  [address]  [place] [address]     Dear Colleague,    Thank you for referring your patient, Casie Harris, to the [facility]. Please see a copy of my visit note below.                            Consult Notes on Referred Patient    Date:2018       Dr. Destiny Bernstein MD  [address] [place] [address]       RE: Casie Harris  : 1960  KANDICE: 2018    Dear Dr. Destiny Bernstein:    I had the pleasure of seeing your patient Casie Harris here at the Gynecologic Cancer Clinic at the [facility] on 2018.  As you know she is a very pleasant 57 year old woman with a recent diagnosis of an ovarian cyst.  Given these findings she was subsequently sent to the Gynecologic Cancer Clinic for new patient consultation.     Patient presents today for a third opinion.  History obtained from patient and review of EMR.  Briefly, she is a 58yo G0, postmenopausal since , who noted some pelvic pressure. Pelvic US done on 18 showing normal uterus, ES 2.8mm, right ovary with 2.5x 2.5cm hypoechoic mass, left ovary normal.  She had a follow-up US on 18 showng slight increase in size of cyst to 2.5 x 2.9cm.   normal.  She is scheduled for surgical removal and presents today for a third opinion.    Overall, doing well.     Review of Systems:  Answers for HPI/ROS submitted by the patient on 2018   General Symptoms: Yes  Skin Symptoms: No  HENT Symptoms: Yes  EYE SYMPTOMS: No  HEART SYMPTOMS: No  LUNG SYMPTOMS: No  INTESTINAL SYMPTOMS: Yes  URINARY SYMPTOMS: No  GYNECOLOGIC SYMPTOMS: Yes  BREAST SYMPTOMS: No  SKELETAL SYMPTOMS: Yes  BLOOD SYMPTOMS: No  NERVOUS SYSTEM SYMPTOMS: Yes  MENTAL HEALTH SYMPTOMS: Yes  Fever: No  Loss of appetite: No  Weight loss: Yes  Weight gain: No  Fatigue: No  Night sweats: No  Chills: No  Increased stress: Yes  Excessive hunger: No  Excessive thirst:  No  Feeling hot or cold when others believe the temperature is normal: No  Loss of height: No  Post-operative complications: No  Surgical site pain: No  Hallucinations: No  Change in or Loss of Energy: No  Hyperactivity: No  Confusion: No  Ear pain: No  Ear discharge: No  Hearing loss: Yes  Tinnitus: Yes  Nosebleeds: No  Congestion: No  Sinus pain: No  Trouble swallowing: No   Voice hoarseness: No  Mouth sores: No  Sore throat: No  Tooth pain: No  Gum tenderness: No  Bleeding gums: No  Change in taste: No  Change in sense of smell: No  Dry mouth: No  Hearing aid used: No  Neck lump: No  Heart burn or indigestion: No  Nausea: No  Vomiting: No  Abdominal pain: Yes  Bloating: No  Constipation: No  Diarrhea: No  Blood in stool: No  Black stools: No  Rectal or Anal pain: No  Fecal incontinence: No  Yellowing of skin or eyes: No  Vomit with blood: No  Change in stools: No  Back pain: Yes  Muscle aches: Yes  Neck pain: No  Swollen joints: No  Joint pain: Yes  Bone pain: No  Muscle cramps: No  Muscle weakness: No  Joint stiffness: Yes  Bone fracture: No  Trouble with coordination: No  Dizziness or trouble with balance: No  Fainting or black-out spells: No  Memory loss: No  Headache: No  Seizures: No  Speech problems: No  Tingling: Yes  Tremor: No  Weakness: No  Difficulty walking: No  Paralysis: No  Numbness: No  Bleeding or spotting between periods: No  Heavy or painful periods: No  Irregular periods: No  Vaginal discharge: No  Hot flashes: No  Vaginal dryness: No  Genital ulcers: No  Reduced libido: Yes  Painful intercourse: No  Difficulty with sexual arousal: Yes  Post-menopausal bleeding: No  Nervous or Anxious: Yes  Depression: No  Trouble sleeping: No  Trouble thinking or concentrating: No  Mood changes: Yes  Panic attacks: No    Past Medical History:    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     Arthritis      Autoimmune disease (H) 1987    Chronic Fatigue Syndrome      Colon polyps 2014     Generalised anxiety disorder      Hearing problem 2012, 2017     Lichen sclerosus      Sensorineural hearing loss 2012, 2017     Tinnitus April 2017         Past Surgical History:    Past Surgical History:   Procedure Laterality Date     COLONOSCOPY  03/2014    4 polyps, repeat 3-5 yrs,fragments of tubular adenoma     COLONOSCOPY N/A 5/4/2018    Procedure: COMBINED COLONOSCOPY, SINGLE OR MULTIPLE BIOPSY/POLYPECTOMY BY BIOPSY;  Colonoscopy;  Surgeon: Jaycee Day MD;  [facility]: [facility] polyp was hyperplastic - repeeat in 3 yrs because of poor prep     DILATION AND CURETTAGE  1994     HPV  1988    laser surgery  for HPV and was on 5FU     LASER CO2 CERVIX           Health Maintenance:  Health Maintenance Due   Topic Date Due     URINE DRUG SCREEN Q1 YR  11/18/1975     HIV SCREEN (SYSTEM ASSIGNED)  11/18/1978     ADVANCE DIRECTIVE PLANNING Q5 YRS  11/18/2015     INFLUENZA VACCINE (1) 09/01/2018       Current Medications:     has a current medication list which includes the following prescription(s): ibuprofen, oxycodone-acetaminophen, calcium-magnesium, vitamin d, clonazepam, escitalopram, estradiol, HERBALS, mometasone, probiotic product, and progesterone.       Allergies:     [unfilled]        Social History:     Social History   Substance Use Topics     Smoking status: Never Smoker     Smokeless tobacco: Never Used     Alcohol use 0.0 oz/week      Comment: daily glass of wine       History   Drug Use No           Family History:     The patient's family history is notable for:    Family History   Problem Relation Age of Onset     Prostate Cancer Father      HEART DISEASE Father      Cardiovascular Father      AF CHF     Cancer Father      BCC     Neurologic Disorder Father      Cerebrovascular Disease Sister      Lipids Sister      HEART DISEASE Maternal Grandfather      HEART DISEASE Paternal Grandmother      Cerebrovascular Disease Mother      Breast Cancer Paternal Aunt      Suicide  "Sister      Depression Sister      Mental Illness Sister      Breast Cancer Other          Physical Exam:     /73  Pulse 75  Temp 97.6  F (36.4  C)  Ht 1.803 m (5' 10.98\")  Wt 68.5 kg (151 lb)  LMP 03/25/2012  SpO2 99%  BMI 21.07 kg/m2  Body mass index is 21.07 kg/(m^2).    General Appearance: healthy and alert, no distress     Assessment:    Casie Harris is a 57 year old woman with a new diagnosis of a complex ovarian cyst    A total of 60 minutes was spent with the patient, 50 minutes of which were spent in counseling the patient and/or treatment planning.      Plan:     1.)    Reviewed imaging findings in detail with patient.  Overall, cyst is not concerning in appearance and unlikely to be malignant.  She is relatively asymptomatic, cyst overall stable, normal .  This is all reassurring for a benign process.  However, given her age and complexity of cyst, agree with surgical removal.  She is comfortable with her current surgical plan and her current surgery.  I feel that the cyst is low risk in appearance and okay for her general Ob/Gyn to remove.    Discussed in detail, questions answered, she expressed understanding of plan of care.     Maia Brown MD  Gynecologic Oncology  [facility]      CC  Patient Care Team:  Sherice Jamil MD PhD as PCP - General (Family Practice)  SHANTELLE ALLEN      "

## 2018-09-27 NOTE — PROGRESS NOTES
Consult Notes on Referred Patient    Date:2018       Dr. Destiny Bernstein MD  [address] [address]       RE: Casie Harris  : 1960  KANDICE: 2018    Dear Dr. Destiny Bernstein:    I had the pleasure of seeing your patient Casie Harris here at the Gynecologic Cancer Clinic at the [facility] on 2018.  As you know she is a very pleasant 57 year old woman with a recent diagnosis of an ovarian cyst.  Given these findings she was subsequently sent to the Gynecologic Cancer Clinic for new patient consultation.     Patient presents today for a third opinion.  History obtained from patient and review of EMR.  Briefly, she is a 58yo G0, postmenopausal since , who noted some pelvic pressure. Pelvic US done on 18 showing normal uterus, ES 2.8mm, right ovary with 2.5x 2.5cm hypoechoic mass, left ovary normal.  She had a follow-up US on 18 showng slight increase in size of cyst to 2.5 x 2.9cm.   normal.  She is scheduled for surgical removal and presents today for a third opinion.    Overall, doing well.     Review of Systems:  Answers for HPI/ROS submitted by the patient on 2018   General Symptoms: Yes  Skin Symptoms: No  HENT Symptoms: Yes  EYE SYMPTOMS: No  HEART SYMPTOMS: No  LUNG SYMPTOMS: No  INTESTINAL SYMPTOMS: Yes  URINARY SYMPTOMS: No  GYNECOLOGIC SYMPTOMS: Yes  BREAST SYMPTOMS: No  SKELETAL SYMPTOMS: Yes  BLOOD SYMPTOMS: No  NERVOUS SYSTEM SYMPTOMS: Yes  MENTAL HEALTH SYMPTOMS: Yes  Fever: No  Loss of appetite: No  Weight loss: Yes  Weight gain: No  Fatigue: No  Night sweats: No  Chills: No  Increased stress: Yes  Excessive hunger: No  Excessive thirst: No  Feeling hot or cold when others believe the temperature is normal: No  Loss of height: No  Post-operative complications: No  Surgical site pain: No  Hallucinations: No  Change in or Loss of Energy: No  Hyperactivity: No  Confusion: No  Ear pain: No  Ear  discharge: No  Hearing loss: Yes  Tinnitus: Yes  Nosebleeds: No  Congestion: No  Sinus pain: No  Trouble swallowing: No   Voice hoarseness: No  Mouth sores: No  Sore throat: No  Tooth pain: No  Gum tenderness: No  Bleeding gums: No  Change in taste: No  Change in sense of smell: No  Dry mouth: No  Hearing aid used: No  Neck lump: No  Heart burn or indigestion: No  Nausea: No  Vomiting: No  Abdominal pain: Yes  Bloating: No  Constipation: No  Diarrhea: No  Blood in stool: No  Black stools: No  Rectal or Anal pain: No  Fecal incontinence: No  Yellowing of skin or eyes: No  Vomit with blood: No  Change in stools: No  Back pain: Yes  Muscle aches: Yes  Neck pain: No  Swollen joints: No  Joint pain: Yes  Bone pain: No  Muscle cramps: No  Muscle weakness: No  Joint stiffness: Yes  Bone fracture: No  Trouble with coordination: No  Dizziness or trouble with balance: No  Fainting or black-out spells: No  Memory loss: No  Headache: No  Seizures: No  Speech problems: No  Tingling: Yes  Tremor: No  Weakness: No  Difficulty walking: No  Paralysis: No  Numbness: No  Bleeding or spotting between periods: No  Heavy or painful periods: No  Irregular periods: No  Vaginal discharge: No  Hot flashes: No  Vaginal dryness: No  Genital ulcers: No  Reduced libido: Yes  Painful intercourse: No  Difficulty with sexual arousal: Yes  Post-menopausal bleeding: No  Nervous or Anxious: Yes  Depression: No  Trouble sleeping: No  Trouble thinking or concentrating: No  Mood changes: Yes  Panic attacks: No    Past Medical History:    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     Arthritis      Autoimmune disease (H) 1987    Chronic Fatigue Syndrome     Colon polyps 2014     Generalised anxiety disorder      Hearing problem 2012, 2017     Lichen sclerosus      Sensorineural hearing loss 2012, 2017     Tinnitus April 2017         Past Surgical History:    Past Surgical History:   Procedure Laterality  "Date     COLONOSCOPY  03/2014    4 polyps, repeat 3-5 yrs,fragments of tubular adenoma     COLONOSCOPY N/A 5/4/2018    Procedure: COMBINED COLONOSCOPY, SINGLE OR MULTIPLE BIOPSY/POLYPECTOMY BY BIOPSY;  Colonoscopy;  Surgeon: Jaycee Day MD;  [facility] - repeeat in 3 yrs because of poor prep     DILATION AND CURETTAGE  1994     HPV  1988    laser surgery  for HPV and was on 5FU     LASER CO2 CERVIX           Health Maintenance:  Health Maintenance Due   Topic Date Due     URINE DRUG SCREEN Q1 YR  11/18/1975     HIV SCREEN (SYSTEM ASSIGNED)  11/18/1978     ADVANCE DIRECTIVE PLANNING Q5 YRS  11/18/2015     INFLUENZA VACCINE (1) 09/01/2018       Current Medications:     has a current medication list which includes the following prescription(s): ibuprofen, oxycodone-acetaminophen, calcium-magnesium, vitamin d, clonazepam, escitalopram, estradiol, HERBALS, mometasone, probiotic product, and progesterone.       Allergies:     [unfilled]        Social History:     Social History   Substance Use Topics     Smoking status: Never Smoker     Smokeless tobacco: Never Used     Alcohol use 0.0 oz/week      Comment: daily glass of wine       History   Drug Use No           Family History:     The patient's family history is notable for:    Family History   Problem Relation Age of Onset     Prostate Cancer Father      HEART DISEASE Father      Cardiovascular Father      AF CHF     Cancer Father      BCC     Neurologic Disorder Father      Cerebrovascular Disease Sister      Lipids Sister      HEART DISEASE Maternal Grandfather      HEART DISEASE Paternal Grandmother      Cerebrovascular Disease Mother      Breast Cancer Paternal Aunt      Suicide Sister      Depression Sister      Mental Illness Sister      Breast Cancer Other          Physical Exam:     /73  Pulse 75  Temp 97.6  F (36.4  C)  Ht 1.803 m (5' 10.98\")  Wt 68.5 kg (151 lb)  LMP 03/25/2012  SpO2 99%  BMI 21.07 kg/m2  Body " mass index is 21.07 kg/(m^2).    General Appearance: healthy and alert, no distress     Assessment:    Casie Harris is a 57 year old woman with a new diagnosis of a complex ovarian cyst    A total of 60 minutes was spent with the patient, 50 minutes of which were spent in counseling the patient and/or treatment planning.      Plan:     1.)    Reviewed imaging findings in detail with patient.  Overall, cyst is not concerning in appearance and unlikely to be malignant.  She is relatively asymptomatic, cyst overall stable, normal .  This is all reassurring for a benign process.  However, given her age and complexity of cyst, agree with surgical removal.  She is comfortable with her current surgical plan and her current surgery.  I feel that the cyst is low risk in appearance and okay for her general Ob/Gyn to remove.    Discussed in detail, questions answered, she expressed understanding of plan of care.     Maia Brown MD  Gynecologic Oncology  [facility]      CC  Patient Care Team:  Sherice Jamil MD PhD as PCP - General (Family Practice)  Sherice Jamil MD PhD as Referring Physician ([facility])  SHANTELLE ALLEN

## 2018-10-02 ENCOUNTER — OFFICE VISIT (OUTPATIENT)
Dept: NEUROSURGERY | Facility: CLINIC | Age: 58
End: 2018-10-02
Attending: OBSTETRICS & GYNECOLOGY
Payer: COMMERCIAL

## 2018-10-02 ENCOUNTER — RADIANT APPOINTMENT (OUTPATIENT)
Dept: GENERAL RADIOLOGY | Facility: CLINIC | Age: 58
End: 2018-10-02
Attending: PHYSICIAN ASSISTANT
Payer: COMMERCIAL

## 2018-10-02 VITALS
TEMPERATURE: 98.5 F | OXYGEN SATURATION: 98 % | HEART RATE: 108 BPM | HEIGHT: 71 IN | SYSTOLIC BLOOD PRESSURE: 117 MMHG | WEIGHT: 151 LBS | DIASTOLIC BLOOD PRESSURE: 85 MMHG | BODY MASS INDEX: 21.14 KG/M2

## 2018-10-02 DIAGNOSIS — G89.29 CHRONIC BILATERAL LOW BACK PAIN WITHOUT SCIATICA: Primary | ICD-10-CM

## 2018-10-02 DIAGNOSIS — M54.50 CHRONIC BILATERAL LOW BACK PAIN WITHOUT SCIATICA: Primary | ICD-10-CM

## 2018-10-02 DIAGNOSIS — G89.29 CHRONIC BILATERAL LOW BACK PAIN WITHOUT SCIATICA: ICD-10-CM

## 2018-10-02 DIAGNOSIS — M54.50 CHRONIC BILATERAL LOW BACK PAIN WITHOUT SCIATICA: ICD-10-CM

## 2018-10-02 PROCEDURE — G0463 HOSPITAL OUTPT CLINIC VISIT: HCPCS

## 2018-10-02 PROCEDURE — 72100 X-RAY EXAM L-S SPINE 2/3 VWS: CPT

## 2018-10-02 PROCEDURE — 99203 OFFICE O/P NEW LOW 30 MIN: CPT | Performed by: PHYSICIAN ASSISTANT

## 2018-10-02 ASSESSMENT — PAIN SCALES - GENERAL: PAINLEVEL: MODERATE PAIN (5)

## 2018-10-02 NOTE — PROGRESS NOTES
Dr. Eh Parisi  [facility]  Neurosurgery Clinic Visit      CC: Back pain    Primary care Provider: Sherice Jamil    Referring Provider:  Maia Brown MD      Reason For Visit:   I was asked to consult on the patient for back pain.      HPI: Casie Harris is a 57 year old female who presents for evaluation of her chief complaint of low back pain.  She has had symptoms for nearly 1 year, with low back pain that radiates into her hips bilaterally.  She has no radicular leg pain or paresthesias.  She states that she first noticed her symptoms when she was doing a lot of gardening last fall.  She was initially doing some physical therapy, but is not currently doing any.  She normally likes to be very active and does a lot of biking.  She also had an oophorectomy about 1 month ago, and is still healing from that.  She denies any focal weaknesses in the lower extremities.  She denies any bowel or bladder changes.    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    Past Surgical History:   Procedure Laterality Date     COLONOSCOPY  03/2014    4 polyps, repeat 3-5 yrs,fragments of tubular adenoma     COLONOSCOPY N/A 5/4/2018    Procedure: COMBINED COLONOSCOPY, SINGLE OR MULTIPLE BIOPSY/POLYPECTOMY BY BIOPSY;  Colonoscopy;  Surgeon: Jaycee Day MD;  [facility] polyp was hyperplastic - repeeat in 3 yrs because of poor prep     DILATION AND CURETTAGE  1994     HPV  1988    laser surgery  for HPV and was on 5FU     LASER CO2 CERVIX       Past Surgical History reviewed with patient during visit.    Current Outpatient Prescriptions    Medication     Cholecalciferol (VITAMIN D) 2000 UNIT CAPS     clonazePAM (KLONOPIN) 0.5 MG tablet     escitalopram (LEXAPRO) 10 MG tablet     estradiol (VIVELLE-DOT) 0.0375 MG/24HR BIW patch     HERBALS     mometasone (ELOCON) 0.1 % cream     Probiotic Product (PROBIOTIC DAILY PO)     progesterone (PROMETRIUM) 100 MG capsule     Calcium-Magnesium 250-125 MG TABS     oxyCODONE-acetaminophen (PERCOCET) 5-325 MG per tablet     No current facility-administered medications for this visit.        Allergies   Allergen Reactions     Nkda [No Known Drug Allergies]        Social History     Social History     Marital status:      Spouse name: N/A     Number of children: N/A     Years of education: N/A     Occupational History            Social History Main Topics     Smoking status: Never Smoker     Smokeless tobacco: Never Used      Comment: never smoked     Alcohol use 0.0 oz/week      Comment: daily glass of wine     Drug use: No     Sexual activity: Yes     Partners: Male     Birth control/ protection: Post-menopausal     Other Topics Concern     Caffeine Concern Yes     2 c/day     Sleep Concern Yes     Stress Concern Yes     sister committed suicide     Weight Concern Yes     Special Diet No     Exercise Yes     walk q day,  - 3x/wk, bike     Bike Helmet Yes     most times     Seat Belt Yes     Social History Narrative    ,  for 7 years, no children. Family in area.                Family History   Problem Relation Age of Onset     Prostate Cancer Father      HEART DISEASE Father      Cardiovascular Father      AF CHF     Cancer Father      BCC     Neurologic Disorder Father      Cerebrovascular Disease Sister      Lipids Sister      HEART DISEASE Maternal Grandfather      HEART DISEASE Paternal Grandmother      Cerebrovascular Disease Mother      Breast Cancer Paternal Aunt      Suicide Sister      Depression Sister      Mental Illness Sister      Breast Cancer Other            "ROS: 10 point ROS neg other than the symptoms noted above in the HPI.    Vital Signs: /85  Pulse 108  Temp 98.5  F (36.9  C) (Oral)  Ht 5' 11\" (1.803 m)  Wt 151 lb (68.5 kg)  LMP 03/25/2012  SpO2 98%  Breastfeeding? No  BMI 21.06 kg/m2    Examination:  Constitutional:  Alert, well nourished, NAD.  HEENT: Normocephalic, atraumatic.   Pulmonary:  Without shortness of breath, normal effort.   Lymph: no lymphadenopathy to low back or LE.   Integumentary: Skin is free of rashes or lesions.   Cardiovascular:  No pitting edema of BLE.    Psych: Normal affect, no apparent distress    Neurological:  Awake  Alert  Oriented x 3  Speech clear  Cranial nerves II - XII grossly intact  Motor exam   Hip Flexor:                Right: 5/5  Left:  5/5  Hip Adductor:             Right:  5/5  Left:  5/5  Hip Abductor:             Right:  5/5  Left:  5/5  Gastroc Soleus:        Right:  5/5  Left:  5/5  Tib/Ant:                      Right:  5/5  Left:  5/5  EHL:                          Right:  5/5  Left:  5/5       Sensation normal to bilateral upper and lower extremities.    Reflexes are 2+ in the brachial radialis and triceps. Negative Gianna sign bilaterally.    Musculoskeletal:  Gait: Able to stand from a seated position. Normal non-antalgic, non-myelopathic gait.  Lumbar examination reveals no tenderness of the spine or paraspinous muscles.  Hip height is symmetrical. Negative SI joint, sciatic notch or greater trochanteric tenderness to palpation bilaterally.  Straight leg raise is negative bilaterally.      Imaging:   None.  Low back pain    Assessment/Plan:     Low back pain    Casie Harris is a 57 year old female.  I did have a discussion with the patient regarding her symptoms.  She has had about a year of worsening low back pain, with an initial event involving some gardening.  She is not having any radicular leg pain or paresthesias.  She does have a lot of anxiety about going into an MRI, and would " like to avoid this.  In the absence of any radiating pain or paresthesias, an MRI is not essential.  We will check plain x-rays of the low back.  I will also initiate some physical therapy for her.  I also encouraged her to get clearance from her OB/GYN surgeon as far as what kind of activity limitations they would want her to follow.  She voiced agreement and understanding and wished to proceed.          Samir Hutton PA-C  [facility]  [address] [address]    Tel [phone]  Pager [phone]

## 2018-10-02 NOTE — NURSING NOTE
"Casie Harris is a 57 year old female who presents for:  Chief Complaint   Patient presents with     Neurologic Problem     referral from Dr. Brown for low back pain        Vitals:    There were no vitals filed for this visit.    BMI:  Estimated body mass index is 21.07 kg/(m^2) as calculated from the following:    Height as of 8/28/18: 5' 10.98\" (1.803 m).    Weight as of 8/28/18: 151 lb (68.5 kg).    Pain Score:  Data Unavailable        Sara Islas, NAVDEEP, AAS      "

## 2018-10-02 NOTE — LETTER
10/2/2018         RE: Casie Harris  [address] [place] [address]        Dear Colleague,    Thank you for referring your patient, Casie Harris, to the [facility]. Please see a copy of my visit note below.    Dr. Eh Parisi  [facility]  Neurosurgery Clinic Visit      CC: Back pain    Primary care Provider: Sherice Jamil    Referring Provider:  Maia Brown MD      Reason For Visit:   I was asked to consult on the patient for back pain.      HPI: Casie Harris is a 57 year old female who presents for evaluation of her chief complaint of low back pain.  She has had symptoms for nearly 1 year, with low back pain that radiates into her hips bilaterally.  She has no radicular leg pain or paresthesias.  She states that she first noticed her symptoms when she was doing a lot of gardening last fall.  She was initially doing some physical therapy, but is not currently doing any.  She normally likes to be very active and does a lot of biking.  She also had an oophorectomy about 1 month ago, and is still healing from that.  She denies any focal weaknesses in the lower extremities.  She denies any bowel or bladder changes.    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    Past Surgical History:   Procedure Laterality Date     COLONOSCOPY  03/2014    4 polyps, repeat 3-5 yrs,fragments of tubular adenoma     COLONOSCOPY N/A 5/4/2018    Procedure: COMBINED COLONOSCOPY, SINGLE OR MULTIPLE BIOPSY/POLYPECTOMY BY BIOPSY;  Colonoscopy;  Surgeon: Jaycee Day MD;  [facility] polyp was  hyperplastic - repeeat in 3 yrs because of poor prep     DILATION AND CURETTAGE  1994     HPV  1988    laser surgery  for HPV and was on 5FU     LASER CO2 CERVIX       Past Surgical History reviewed with patient during visit.    Current Outpatient Prescriptions   Medication     Cholecalciferol (VITAMIN D) 2000 UNIT CAPS     clonazePAM (KLONOPIN) 0.5 MG tablet     escitalopram (LEXAPRO) 10 MG tablet     estradiol (VIVELLE-DOT) 0.0375 MG/24HR BIW patch     HERBALS     mometasone (ELOCON) 0.1 % cream     Probiotic Product (PROBIOTIC DAILY PO)     progesterone (PROMETRIUM) 100 MG capsule     Calcium-Magnesium 250-125 MG TABS     oxyCODONE-acetaminophen (PERCOCET) 5-325 MG per tablet     No current facility-administered medications for this visit.        Allergies   Allergen Reactions     Nkda [No Known Drug Allergies]        Social History     Social History     Marital status:      Spouse name: N/A     Number of children: N/A     Years of education: N/A     Occupational History            Social History Main Topics     Smoking status: Never Smoker     Smokeless tobacco: Never Used      Comment: never smoked     Alcohol use 0.0 oz/week      Comment: daily glass of wine     Drug use: No     Sexual activity: Yes     Partners: Male     Birth control/ protection: Post-menopausal     Other Topics Concern     Caffeine Concern Yes     2 c/day     Sleep Concern Yes     Stress Concern Yes     sister committed suicide     Weight Concern Yes     Special Diet No     Exercise Yes     walk q day,  - 3x/wk, bike     Bike Helmet Yes     most times     Seat Belt Yes     Social History Narrative    ,  for 7 years, no children. Family in area.                Family History   Problem Relation Age of Onset     Prostate Cancer Father      HEART DISEASE Father      Cardiovascular Father      AF CHF     Cancer Father      BCC     Neurologic Disorder Father      Cerebrovascular Disease Sister       "Lipids Sister      HEART DISEASE Maternal Grandfather      HEART DISEASE Paternal Grandmother      Cerebrovascular Disease Mother      Breast Cancer Paternal Aunt      Suicide Sister      Depression Sister      Mental Illness Sister      Breast Cancer Other           ROS: 10 point ROS neg other than the symptoms noted above in the HPI.    Vital Signs: /85  Pulse 108  Temp 98.5  F (36.9  C) (Oral)  Ht 5' 11\" (1.803 m)  Wt 151 lb (68.5 kg)  LMP 03/25/2012  SpO2 98%  Breastfeeding? No  BMI 21.06 kg/m2    Examination:  Constitutional:  Alert, well nourished, NAD.  HEENT: Normocephalic, atraumatic.   Pulmonary:  Without shortness of breath, normal effort.   Lymph: no lymphadenopathy to low back or LE.   Integumentary: Skin is free of rashes or lesions.   Cardiovascular:  No pitting edema of BLE.    Psych: Normal affect, no apparent distress    Neurological:  Awake  Alert  Oriented x 3  Speech clear  Cranial nerves II - XII grossly intact  Motor exam   Hip Flexor:                Right: 5/5  Left:  5/5  Hip Adductor:             Right:  5/5  Left:  5/5  Hip Abductor:             Right:  5/5  Left:  5/5  Gastroc Soleus:        Right:  5/5  Left:  5/5  Tib/Ant:                      Right:  5/5  Left:  5/5  EHL:                          Right:  5/5  Left:  5/5       Sensation normal to bilateral upper and lower extremities.    Reflexes are 2+ in the brachial radialis and triceps. Negative Gianna sign bilaterally.    Musculoskeletal:  Gait: Able to stand from a seated position. Normal non-antalgic, non-myelopathic gait.  Lumbar examination reveals no tenderness of the spine or paraspinous muscles.  Hip height is symmetrical. Negative SI joint, sciatic notch or greater trochanteric tenderness to palpation bilaterally.  Straight leg raise is negative bilaterally.      Imaging:   None.  Low back pain    Assessment/Plan:     Low back pain    Casie Harris is a 57 year old female.  I did have a discussion with " the patient regarding her symptoms.  She has had about a year of worsening low back pain, with an initial event involving some gardening.  She is not having any radicular leg pain or paresthesias.  She does have a lot of anxiety about going into an MRI, and would like to avoid this.  In the absence of any radiating pain or paresthesias, an MRI is not essential.  We will check plain x-rays of the low back.  I will also initiate some physical therapy for her.  I also encouraged her to get clearance from her OB/GYN surgeon as far as what kind of activity limitations they would want her to follow.  She voiced agreement and understanding and wished to proceed.          Samir Hutton PA-C  [facility]  [address] [address]    Tel [phone]  Pager [phone]      Again, thank you for allowing me to participate in the care of your patient.        Sincerely,        Samir uHtton PA-C

## 2018-10-02 NOTE — MR AVS SNAPSHOT
After Visit Summary   10/2/2018    Casie Harris    MRN: [id number]           Patient Information     Date Of Birth          1960        Visit Information        Provider Department      10/2/2018 8:40 AM Samir Hutton PA-C [facility]        Today's Diagnoses     Chronic bilateral low back pain without sciatica    -  1       Follow-ups after your visit        Additional Services     [facility] PT, HAND, AND CHIROPRACTIC REFERRAL       Physical Therapy, Hand Therapy and Chiropractic Care are available through:  *[facility]  *Hand Therapy (Occupational Therapy or Physical Therapy)  *[facility]    Call one number to schedule at any of the above locations: [phone].    Physical therapy, Hand therapy and/or Chiropractic care has been recommended by your physician as an excellent treatment option to reduce pain and help people return to normal activities, including sports.  Therapy and/or chiropractic care services are a great complement or alternative to expensive and invasive surgery, injections, or long-term use of prescription medications. The primary goal is to identify the underlying problem and provide you the tools to manage your condition on your own.     Please be aware that coverage of these services is subject to the terms and limitations of your health insurance plan.  Call member services at your health plan with any benefit or coverage questions.      Please bring the following to your appointment:  *Your personal calendar for scheduling future appointments  *Comfortable clothing                  Your next 10 appointments already scheduled     Oct 15, 2018 11:20 AM CDT   (Arrive by 11:05 AM)   New Patient Visit with Meño Calle MD   [facility] ([facility])    [address] [place] [id number] [phone]              Future  "tests that were ordered for you today     Open Future Orders        Priority Expected Expires Ordered    [facility] PT, HAND, AND CHIROPRACTIC REFERRAL Routine  10/2/2019 10/2/2018            Who to contact     If you have questions or need follow up information about today's clinic visit or your schedule please contact [facility] directly at [phone].  Normal or non-critical lab and imaging results will be communicated to you by [employer], letter or phone within 4 business days after the clinic has received the results. If you do not hear from us within 7 days, please contact the clinic through [employer] or phone. If you have a critical or abnormal lab result, we will notify you by phone as soon as possible.  Submit refill requests through [employer] or call your pharmacy and they will forward the refill request to us. Please allow 3 business days for your refill to be completed.          Additional Information About Your Visit        [employer] Information     [employer] gives you secure access to your electronic health record. If you see a primary care provider, you can also send messages to your care team and make appointments. If you have questions, please call your primary care clinic.  If you do not have a primary care provider, please call [phone] and they will assist you.        Care EveryWhere ID     This is your Care EveryWhere ID. This could be used by other organizations to access your [facility] records  [id number]        Your Vitals Were     Pulse Temperature Height Last Period Pulse Oximetry Breastfeeding?    108 98.5  F (36.9  C) (Oral) 5' 11\" (1.803 m) 03/25/2012 98% No    BMI (Body Mass Index)                   21.06 kg/m2            Blood Pressure from Last 3 Encounters:   10/02/18 117/85   08/28/18 116/73   08/01/18 115/76    Weight from Last 3 Encounters:   10/02/18 151 lb (68.5 kg)   08/28/18 151 lb (68.5 kg)   08/01/18 156 lb 9.6 oz (71 kg)                 Today's " Medication Changes          These changes are accurate as of 10/2/18  9:27 AM.  If you have any questions, ask your nurse or doctor.               Stop taking these medicines if you haven't already. Please contact your care team if you have questions.     ibuprofen 600 MG tablet   Commonly known as:  ADVIL/MOTRIN   Stopped by:  Samir Hutton PA-C                    Primary Care Provider Office Phone # Fax #    Sherice Jamil MD PhD [phone]       [address] [place] [id number]        Equal Access to Services     [facility]: Hadii aad ku hadasho Soomaali, waaxda luqadaha, qaybta kaalmada adeegyada, waxgiuseppe smyth haysabina vora . So [facility] [phone].    ATENCIÓN: Si habla español, tiene a paez disposición servicios gratuitos de asistencia lingüística. [facility] [phone].    We comply with applicable federal civil rights laws and [place] laws. We do not discriminate on the basis of race, color, national origin, age, disability, sex, sexual orientation, or gender identity.            Thank you!     Thank you for choosing [facility]  for your care. Our goal is always to provide you with excellent care. Hearing back from our patients is one way we can continue to improve our services. Please take a few minutes to complete the written survey that you may receive in the mail after your visit with us. Thank you!             Your Updated Medication List - Protect others around you: Learn how to safely use, store and throw away your medicines at www.disposemymeds.org.          This list is accurate as of 10/2/18  9:27 AM.  Always use your most recent med list.                   Brand Name Dispense Instructions for use Diagnosis    Calcium-Magnesium 250-125 MG Tabs      (Chewables) take 4 daily        clonazePAM 0.5 MG tablet    klonoPIN    30 tablet    Take 1 tablet (0.5 mg) by mouth 2 times daily as needed for anxiety    BECCA (generalized  anxiety disorder)       escitalopram 10 MG tablet    LEXAPRO    90 tablet    Start with 1/2 tablet daily and increase to 1 full tablet in 7-10 days.    BECCA (generalized anxiety disorder)       estradiol 0.0375 MG/24HR BIW patch    VIVELLE-DOT    24 patch    Place 1 patch onto the skin twice a week    Vasomotor symptoms due to menopause       HERBALS      Chinese Herbal supplement: Candie Quiroz Helps with stress and anxiety        mometasone 0.1 % cream    ELOCON          oxyCODONE-acetaminophen 5-325 MG per tablet    PERCOCET     Take 1 tablet by mouth        PROBIOTIC DAILY PO      Take by mouth as needed        progesterone 100 MG capsule    PROMETRIUM    90 capsule    Take 1 capsule (100 mg) by mouth daily    Vasomotor symptoms due to menopause       vitamin D 2000 units Caps      Take 2 daily

## 2018-10-03 ENCOUNTER — MYC MEDICAL ADVICE (OUTPATIENT)
Dept: FAMILY MEDICINE | Facility: CLINIC | Age: 58
End: 2018-10-03

## 2018-10-03 DIAGNOSIS — F41.1 GAD (GENERALIZED ANXIETY DISORDER): ICD-10-CM

## 2018-10-04 ENCOUNTER — MYC MEDICAL ADVICE (OUTPATIENT)
Dept: NEUROSURGERY | Facility: CLINIC | Age: 58
End: 2018-10-04

## 2018-10-05 RX ORDER — CLONAZEPAM 0.5 MG/1
0.5 TABLET ORAL 2 TIMES DAILY PRN
Qty: 30 TABLET | Refills: 0 | Status: SHIPPED | OUTPATIENT
Start: 2018-10-05 | End: 2019-05-22

## 2018-10-05 NOTE — TELEPHONE ENCOUNTER
Note from Dr. Jamil  From 10/4/18:    Ramandeep   Please give her a refill  - #=[phone]   Sherice Montenegro RN on 10/5/2018 at 7:38 AM

## 2018-10-09 ENCOUNTER — THERAPY VISIT (OUTPATIENT)
Dept: PHYSICAL THERAPY | Facility: CLINIC | Age: 58
End: 2018-10-09
Attending: PHYSICIAN ASSISTANT
Payer: COMMERCIAL

## 2018-10-09 DIAGNOSIS — M54.50 CHRONIC BILATERAL LOW BACK PAIN WITHOUT SCIATICA: ICD-10-CM

## 2018-10-09 DIAGNOSIS — G89.29 CHRONIC BILATERAL LOW BACK PAIN WITHOUT SCIATICA: ICD-10-CM

## 2018-10-09 PROCEDURE — 97110 THERAPEUTIC EXERCISES: CPT | Mod: GP | Performed by: PHYSICAL THERAPIST

## 2018-10-09 PROCEDURE — 97161 PT EVAL LOW COMPLEX 20 MIN: CPT | Mod: GP | Performed by: PHYSICAL THERAPIST

## 2018-10-09 NOTE — PROGRESS NOTES
[facility] Initial Evaluation  Subjective:  HPI Comments: C/C:  Pt reports intermittent (waxes and wanes) back pain with tingling feeling radiation around t the front of her body. Worse with sitting, better with walking/heat.  Is recovering from recent oophorectomy 4 wks ago.  Reports no limits from surgeon.  Hx:  11/17-Noted onset after trimming dogwoods in yard.  Had PT and was given ext ex and told to avoid flexion.  Has not noted relief.  Had several tx of chiropractic, but again did not gain any relief.  X-rays revealed degen disc dis as per patient.   PMH:  Has long hx of intermittent back pain that was shorted lived and easily managed.  Current episode more persistent.  Recent surgery for cyst and fibroid removal-right ovary removed and fallopian tubes-pt stated 4 wks ago-unable to find exact date.  Hx of anxiety.  Pt likes to walk, cycle, ski.    General health:  Good.  Works as a writer.     The history is provided by the patient.                       Objective:    Gait:    Gait Type:  Normal         Flexibility/Screens:   Positive screens:  ThoracicNegative screens: Hip                    Lumbar/SI Evaluation  ROM:    AROM Lumbar:   Flexion:          Fingers to mid lower leg-no change  Ext:                    WNL-no change in sx   Side Bend:        Left:  WNL-no change    Right:  WNL- no change  Rotation:           Left:     Right:   Side Glide:        Left:     Right:       AROM Thoracic:  Flex:               WNL  Ext:                90%  Rotation:        Left:  50% (+)    Right:  50%       Lumbar Myotomes:    T12-L3 (Hip Flex):  Left: 5    Right: 5  L2-4 (Quads):  Left:  5    Right:  5  L4 (Ankle DF):  Left:  5    Right:  5  L5 (Great Toe Ext): Left: 5    Right: 5   S1 (Toe Raise):  Left: 5    Right: 5  Lumbar DTR's:    L4 (Quad):  Left:  3   Right:  3  S1 (Achilles):  Left:  3   Right:  3  Cord Signs:  normal    Lumbar Dermtomes:  normal                Neural Tension/Mobility:     Left side:  SLR and SLR w/DF positive.   Right side:   SLR w/DF and SLR positive.  Lumbar Palpation:      Tenderness not present at Left:    Erector Spinae  Tenderness present at Right: Erector Spinae    Lumbar Provocation:  Lumbar provocation: Tender with PAs T10-L3.  No pain with ribs.                                                                                          Musculoskeletal:        Back:        ROS    Assessment/Plan:    Patient is a 57 year old female with lumbar complaints.    Patient has the following significant findings with corresponding treatment plan.                Diagnosis 1:  LBP without sciatica  Pain -  manual therapy, self management, education and home program  Decreased ROM/flexibility - manual therapy and therapeutic exercise  Decreased joint mobility - manual therapy and therapeutic exercise  Decreased strength - therapeutic exercise and therapeutic activities  Impaired muscle performance - neuro re-education  Decreased function - therapeutic activities  Impaired posture - neuro re-education    Therapy Evaluation Codes:   1) History comprised of:   Personal factors that impact the plan of care:      Anxiety.    Comorbidity factors that impact the plan of care are:      None.     Medications impacting care: Anti-depressant.  2) Examination of Body Systems comprised of:   Body structures and functions that impact the plan of care:      Lumbar spine and Thoracic Spine.   Activity limitations that impact the plan of care are:      Sitting.  3) Clinical presentation characteristics are:   Stable/Uncomplicated.  4) Decision-Making    Low complexity using standardized patient assessment instrument and/or measureable assessment of functional outcome.  Cumulative Therapy Evaluation is: Low complexity.    Previous and current functional limitations:  (See Goal Flow Sheet for this information)    Short term and Long term goals: (See Goal Flow Sheet for this information)     Communication  ability:  Patient appears to be able to clearly communicate and understand verbal and written communication and follow directions correctly.  Treatment Explanation - The following has been discussed with the patient:   RX ordered/plan of care  Anticipated outcomes  Possible risks and side effects  This patient would benefit from PT intervention to resume normal activities.   Rehab potential is excellent.    Frequency:  1 X week, once daily  Duration:  for 8 weeks  Discharge Plan:  Achieve all LTG.  Independent in home treatment program.  Reach maximal therapeutic benefit.    Please refer to the daily flowsheet for treatment today, total treatment time and time spent performing 1:1 timed codes.

## 2018-10-09 NOTE — PROGRESS NOTES
[facility] Initial Evaluation  Subjective:  Patient is a 57 year old female presenting with rehab general hpi.   General   Please check all that apply to your current or past medical history:  Depression and menopausal (Numbness/tingling, anxiety)  Medical allergies:  No  Surgical history: Recent ophorectomy-September.  Medications you are currently taking:  Anti-depressants and hormone replacement therapy  Occupation comment:  Writer  What are your primary job tasks:  Prolonged sitting (Computer work)                      Objective:  System    Physical Exam    General     ROS    Assessment/Plan:

## 2018-10-16 ENCOUNTER — THERAPY VISIT (OUTPATIENT)
Dept: PHYSICAL THERAPY | Facility: CLINIC | Age: 58
End: 2018-10-16
Payer: COMMERCIAL

## 2018-10-16 DIAGNOSIS — G89.29 CHRONIC BILATERAL LOW BACK PAIN WITHOUT SCIATICA: ICD-10-CM

## 2018-10-16 DIAGNOSIS — M54.50 CHRONIC BILATERAL LOW BACK PAIN WITHOUT SCIATICA: ICD-10-CM

## 2018-10-16 PROCEDURE — 97140 MANUAL THERAPY 1/> REGIONS: CPT | Mod: GP | Performed by: PHYSICAL THERAPIST

## 2018-10-16 PROCEDURE — 97112 NEUROMUSCULAR REEDUCATION: CPT | Mod: GP | Performed by: PHYSICAL THERAPIST

## 2018-10-16 PROCEDURE — 97110 THERAPEUTIC EXERCISES: CPT | Mod: GP | Performed by: PHYSICAL THERAPIST

## 2018-10-25 ENCOUNTER — THERAPY VISIT (OUTPATIENT)
Dept: PHYSICAL THERAPY | Facility: CLINIC | Age: 58
End: 2018-10-25
Payer: COMMERCIAL

## 2018-10-25 DIAGNOSIS — M54.50 CHRONIC BILATERAL LOW BACK PAIN WITHOUT SCIATICA: ICD-10-CM

## 2018-10-25 DIAGNOSIS — G89.29 CHRONIC BILATERAL LOW BACK PAIN WITHOUT SCIATICA: ICD-10-CM

## 2018-10-25 PROCEDURE — 97140 MANUAL THERAPY 1/> REGIONS: CPT | Mod: GP | Performed by: PHYSICAL THERAPIST

## 2018-10-25 PROCEDURE — 97110 THERAPEUTIC EXERCISES: CPT | Mod: GP | Performed by: PHYSICAL THERAPIST

## 2018-11-09 ENCOUNTER — THERAPY VISIT (OUTPATIENT)
Dept: PHYSICAL THERAPY | Facility: CLINIC | Age: 58
End: 2018-11-09
Payer: COMMERCIAL

## 2018-11-09 DIAGNOSIS — M54.50 CHRONIC BILATERAL LOW BACK PAIN WITHOUT SCIATICA: ICD-10-CM

## 2018-11-09 DIAGNOSIS — G89.29 CHRONIC BILATERAL LOW BACK PAIN WITHOUT SCIATICA: ICD-10-CM

## 2018-11-09 PROCEDURE — 97530 THERAPEUTIC ACTIVITIES: CPT | Mod: GP | Performed by: PHYSICAL THERAPIST

## 2018-11-09 PROCEDURE — 97110 THERAPEUTIC EXERCISES: CPT | Mod: GP | Performed by: PHYSICAL THERAPIST

## 2018-11-09 PROCEDURE — 97140 MANUAL THERAPY 1/> REGIONS: CPT | Mod: GP | Performed by: PHYSICAL THERAPIST

## 2018-11-16 ENCOUNTER — THERAPY VISIT (OUTPATIENT)
Dept: PHYSICAL THERAPY | Facility: CLINIC | Age: 58
End: 2018-11-16
Payer: COMMERCIAL

## 2018-11-16 DIAGNOSIS — M54.50 CHRONIC BILATERAL LOW BACK PAIN WITHOUT SCIATICA: ICD-10-CM

## 2018-11-16 DIAGNOSIS — G89.29 CHRONIC BILATERAL LOW BACK PAIN WITHOUT SCIATICA: ICD-10-CM

## 2018-11-16 PROCEDURE — 97110 THERAPEUTIC EXERCISES: CPT | Mod: GP | Performed by: PHYSICAL THERAPIST

## 2018-11-16 PROCEDURE — 97530 THERAPEUTIC ACTIVITIES: CPT | Mod: GP | Performed by: PHYSICAL THERAPIST

## 2018-11-26 ENCOUNTER — THERAPY VISIT (OUTPATIENT)
Dept: PHYSICAL THERAPY | Facility: CLINIC | Age: 58
End: 2018-11-26
Payer: COMMERCIAL

## 2018-11-26 DIAGNOSIS — G89.29 CHRONIC BILATERAL LOW BACK PAIN WITHOUT SCIATICA: ICD-10-CM

## 2018-11-26 DIAGNOSIS — M54.50 CHRONIC BILATERAL LOW BACK PAIN WITHOUT SCIATICA: ICD-10-CM

## 2018-11-26 PROCEDURE — 97110 THERAPEUTIC EXERCISES: CPT | Mod: GP | Performed by: PHYSICAL THERAPIST

## 2018-11-26 PROCEDURE — 97530 THERAPEUTIC ACTIVITIES: CPT | Mod: GP | Performed by: PHYSICAL THERAPIST

## 2018-12-03 ENCOUNTER — OFFICE VISIT (OUTPATIENT)
Dept: DERMATOLOGY | Facility: CLINIC | Age: 58
End: 2018-12-03
Payer: COMMERCIAL

## 2018-12-03 DIAGNOSIS — R21 RASH AND NONSPECIFIC SKIN ERUPTION: Primary | ICD-10-CM

## 2018-12-03 RX ORDER — HALOBETASOL PROPIONATE 0.05 %
OINTMENT (GRAM) TOPICAL
COMMUNITY
Start: 2018-11-28 | End: 2019-05-22

## 2018-12-03 RX ORDER — TACROLIMUS 1 MG/G
OINTMENT TOPICAL 2 TIMES DAILY
Qty: 60 G | Refills: 0 | Status: SHIPPED | OUTPATIENT
Start: 2018-12-03 | End: 2019-05-22

## 2018-12-03 ASSESSMENT — PAIN SCALES - GENERAL: PAINLEVEL: NO PAIN (0)

## 2018-12-03 NOTE — PATIENT INSTRUCTIONS
Patch testing Dec 12 at 5:15 PM    For the next week, start Tacrolimus (Protopic) ointment at night and use the Mometasone in the morning.  After that if you are doing well, start using just the Tacrolimus (Protopic twice daily).  Schedule patch testing.   Use the Free and Clear wash to cleanse the area.  Patch Testing Information  What are allergen patch tests?    The test is done to look for skin allergies that may be causing rashes and irritation.    A patch test is a way of identifying whether a substance has caused a delayed reaction with skin inflammation, such as contact eczema or delayed (after days) reactions to drugs.     We will use various types of test compounds, which may include common allergens you may come in contact with in daily life such as preservatives, fragrances or even drugs.     Most of the time we will use standardized, prefabricated test solutions. The choice of the substances and series tested will depend on your history of reactions. Sometimes we will test your own products as well.     In order to avoid severe toxic reactions we need detailed information or safety sheets for each of the test compounds.    The test panels are set up with a small amount of common substances that cause skin allergies. They are taped to your skin with a clear hypoallergenic bandage and reinforced hypoallergenic tape.    The substances are numbered, so it is easy to tell what is causing a skin reaction.  What do I need to do in preparation for the test?    Stop all systemic steroids 1 month prior to the testing.     Stop applying topical steroids to the test area one week prior to the test. It is to use them elsewhere throughout the testing process. If this is not possible then discuss options with the Allergy specialist.    Do not go sunbathing or tanning for one week prior to testing    It is okay to take antihistamines as normal.     Please wear dark colors the week of the test since we will write on you  with a dark marker that may transfer and stain clothing or bedding.     Some medications can affect the reactions to allergens during the tests. Therefore reveal all the medications you take to the allergy team. The doctor will discuss the medications with you before the tests.     Eat how you normally would.    Avoid the following:    You cannot get the test area wet during the entire test period. This means no bathing or swimming the entire test period.    No strenuous exercise that may cause sweating.    Do not scratch the test area, this can cause the allergen to spread and give us false positives.     Avoid exposure to UV irradiation. This means no tanning or UV treatment during the testing period.   What can I expect?    Patch testing is done over three appointments.   o The usual schedule is: Monday (Allergen patches are placed), Wednesday (Patches are removed and skin is examined by the MD), and Friday (Skin is examined by the MD)      If you are allergic, there will be an area of irritation where the test was placed.    Itching or burning at the test site might happen if you are allergic to the allergen.  Do not rub or scratch the test site since this may spread the allergen and possibly cause false positives. If itching or burning is not tolerable please contact the clinic.    The marker we draw on your back with ma take up to 5 weeks to wash off completely. Rubbing alcohol can help speed up this process.     Reactions can occur 1 to 2 weeks after the tests are applied. If this happens please take photos of the area and contact the clinic.  What should I do after the tests are placed?    Keep the area dry. No showering or getting the test area wet from the time we see you at your first visit until after your third appointment. If you get the test area wet you are washing off the test and we could get false negative reactions.    If you notice any of the test patches coming loose put on more tape to  re-secure the test.    If the marker that is applied fades you can use a dark pen to symone around the panel sites.    Cover the test area when you are outside to avoid any sun exposure while the test is in place.     You can remove the tests only if there is a severe reaction (itch, pain, blistering). Please report this to your doctor immediately. If you have to remove the tests please symone the locations of each test field with a grid so we can identify the allergen.  WHAT ARE THE POSSIBLE RISKS OF PATCH TESTS     If you are allergic to a compound tested you will develop a localized skin reaction similar to your previous reaction, this may take days to develop. These reactions include a formation of red, itchy skin lesions that could be about a centimeter with small vesicles or possibly even blisters. The lesions will fade over time, this may take weeks. The test might leave some skin pigmentation for a few months.     In rare cases a localized reaction to patch testing can become generalized.     The tests with your own products might have some risks because they are not standardized and unanticipated reactions could occur. We need as much information as possible to evaluate if your own products are safe to test and under what conditions. This has to be evaluated for each individual product.   Useful Contact Information    To contact your doctor you can either send a [employer] message or call [phone]     Address  o [address] [place] [address]    If you develop any serious or adverse allergic reaction after office hours please seek immediate medical assistance at the nearest clinic, urgent care, or emergency room.

## 2018-12-03 NOTE — MR AVS SNAPSHOT
After Visit Summary   12/3/2018    Casie Harris    MRN: [id number]           Patient Information     Date Of Birth          1960        Visit Information        Provider Department      12/3/2018 10:15 AM Jarvis Jones MD [facility] Dermatology        Today's Diagnoses     Rash and nonspecific skin eruption    -  1      Care Instructions    Patch testing Dec 12 at 5:15 PM    For the next week, start Tacrolimus (Protopic) ointment at night and use the Mometasone in the morning.  After that if you are doing well, start using just the Tacrolimus (Protopic twice daily).  Schedule patch testing.   Use the Free and Clear wash to cleanse the area.  Patch Testing Information  What are allergen patch tests?    The test is done to look for skin allergies that may be causing rashes and irritation.    A patch test is a way of identifying whether a substance has caused a delayed reaction with skin inflammation, such as contact eczema or delayed (after days) reactions to drugs.     We will use various types of test compounds, which may include common allergens you may come in contact with in daily life such as preservatives, fragrances or even drugs.     Most of the time we will use standardized, prefabricated test solutions. The choice of the substances and series tested will depend on your history of reactions. Sometimes we will test your own products as well.     In order to avoid severe toxic reactions we need detailed information or safety sheets for each of the test compounds.    The test panels are set up with a small amount of common substances that cause skin allergies. They are taped to your skin with a clear hypoallergenic bandage and reinforced hypoallergenic tape.    The substances are numbered, so it is easy to tell what is causing a skin reaction.  What do I need to do in preparation for the test?    Stop all systemic steroids 1 month prior to the testing.     Stop applying topical  steroids to the test area one week prior to the test. It is to use them elsewhere throughout the testing process. If this is not possible then discuss options with the Allergy specialist.    Do not go sunbathing or tanning for one week prior to testing    It is okay to take antihistamines as normal.     Please wear dark colors the week of the test since we will write on you with a dark marker that may transfer and stain clothing or bedding.     Some medications can affect the reactions to allergens during the tests. Therefore reveal all the medications you take to the allergy team. The doctor will discuss the medications with you before the tests.     Eat how you normally would.    Avoid the following:    You cannot get the test area wet during the entire test period. This means no bathing or swimming the entire test period.    No strenuous exercise that may cause sweating.    Do not scratch the test area, this can cause the allergen to spread and give us false positives.     Avoid exposure to UV irradiation. This means no tanning or UV treatment during the testing period.   What can I expect?    Patch testing is done over three appointments.   o The usual schedule is: Monday (Allergen patches are placed), Wednesday (Patches are removed and skin is examined by the MD), and Friday (Skin is examined by the MD)      If you are allergic, there will be an area of irritation where the test was placed.    Itching or burning at the test site might happen if you are allergic to the allergen.  Do not rub or scratch the test site since this may spread the allergen and possibly cause false positives. If itching or burning is not tolerable please contact the clinic.    The marker we draw on your back with ma take up to 5 weeks to wash off completely. Rubbing alcohol can help speed up this process.     Reactions can occur 1 to 2 weeks after the tests are applied. If this happens please take photos of the area and contact the  clinic.  What should I do after the tests are placed?    Keep the area dry. No showering or getting the test area wet from the time we see you at your first visit until after your third appointment. If you get the test area wet you are washing off the test and we could get false negative reactions.    If you notice any of the test patches coming loose put on more tape to re-secure the test.    If the marker that is applied fades you can use a dark pen to symone around the panel sites.    Cover the test area when you are outside to avoid any sun exposure while the test is in place.     You can remove the tests only if there is a severe reaction (itch, pain, blistering). Please report this to your doctor immediately. If you have to remove the tests please symone the locations of each test field with a grid so we can identify the allergen.  WHAT ARE THE POSSIBLE RISKS OF PATCH TESTS     If you are allergic to a compound tested you will develop a localized skin reaction similar to your previous reaction, this may take days to develop. These reactions include a formation of red, itchy skin lesions that could be about a centimeter with small vesicles or possibly even blisters. The lesions will fade over time, this may take weeks. The test might leave some skin pigmentation for a few months.     In rare cases a localized reaction to patch testing can become generalized.     The tests with your own products might have some risks because they are not standardized and unanticipated reactions could occur. We need as much information as possible to evaluate if your own products are safe to test and under what conditions. This has to be evaluated for each individual product.   Useful Contact Information    To contact your doctor you can either send a [employer] message or call [phone]     Address  o [address] [place] [address]    If you develop any serious or adverse allergic reaction after office hours please seek  immediate medical assistance at the nearest clinic, urgent care, or emergency room.            Follow-ups after your visit        Your next 10 appointments already scheduled     Dec 06, 2018  7:00 AM CST   (Arrive by 6:45 AM)   New Patient Visit with Linda Jama PA-C   [facility] ([facility])    [address]  4th Floor  [facility] [address] [phone]            Dec 10, 2018  9:45 AM CST   (Arrive by 9:30 AM)   Return Visit with Jarvis Jones MD   [facility] D[facility] ([facility])    [address]  [address] [facility] [id number] [phone]            Dec 11, 2018 10:40 AM CST   [facility] with Casie Hughes PT   [facility] ([facility]  )    [address] [place] [address] [id number]  [facility] [id number] [phone]            Dec 14, 2018  9:15 AM CST   (Arrive by 9:00 AM)   Return Visit with Jarvis Jones MD   [facility] ([facility])    [address]  [address]rd [facility] [id number] [phone]              Who to contact     Please call your clinic at [phone] to:    Ask questions about your health    Make or cancel appointments    Discuss your medicines    Learn about your test results    Speak to your doctor            Additional Information About Your Visit        [employer] Information     [employer] gives you secure access to your electronic health record. If you see a primary care provider, you can also send messages to your care team and make appointments. If you have questions, please call your primary care clinic.  If you do not have a primary care provider, please call [phone] and they will assist you.      [employer] is an electronic gateway that provides easy, online access to your medical records. With [employer], you can request a clinic appointment, read your test results, renew a prescription or  communicate with your care team.     To access your existing account, please contact your [facility] or call [phone] for assistance.        Care EveryWhere ID     This is your Care EveryWhere ID. This could be used by other organizations to access your [place] medical records  [id number]        Your Vitals Were     Last Period                   03/25/2012            Blood Pressure from Last 3 Encounters:   10/02/18 117/85   08/28/18 116/73   08/01/18 115/76    Weight from Last 3 Encounters:   10/02/18 68.5 kg (151 lb)   08/28/18 68.5 kg (151 lb)   08/01/18 71 kg (156 lb 9.6 oz)              Today, you had the following     No orders found for display         Today's Medication Changes          These changes are accurate as of 12/3/18 11:26 AM.  If you have any questions, ask your nurse or doctor.               Start taking these medicines.        Dose/Directions    tacrolimus 0.1 % external ointment   Commonly known as:  PROTOPIC   Used for:  Rash and nonspecific skin eruption   Started by:  Jarvis Jones MD        Apply topically 2 times daily   Quantity:  60 g   Refills:  0            Where to get your medicines      These medications were sent to [facility] [address] [place] [address] & [facility]  [address] [place] [id number]     Phone:  [phone]     tacrolimus 0.1 % external ointment                Primary Care Provider Office Phone # Fax #    Sherice Balaji Jamil MD PhD [phone]       [address] [place] [id number]        Equal Access to Services     AMBREEN FERRARI : Hadii sara nettleso Sojeremi, waaxda luqadaha, qaybta kaalmada jaswinder carcamo. So [facility] [phone].    ATENCIÓN: Si habla español, tiene a paez disposición servicios gratuitos de asistencia lingüística. Llame al [phone].    We comply with applicable federal civil rights laws and  [place] laws. We do not discriminate on the basis of race, color, national origin, age, disability, sex, sexual orientation, or gender identity.            Thank you!     Thank you for choosing [facility] DERMATOLOGY  for your care. Our goal is always to provide you with excellent care. Hearing back from our patients is one way we can continue to improve our services. Please take a few minutes to complete the written survey that you may receive in the mail after your visit with us. Thank you!             Your Updated Medication List - Protect others around you: Learn how to safely use, store and throw away your medicines at www.disposemymeds.org.          This list is accurate as of 12/3/18 11:26 AM.  Always use your most recent med list.                   Brand Name Dispense Instructions for use Diagnosis    Calcium-Magnesium 250-125 MG Tabs      (Chewables) take 4 daily        clonazePAM 0.5 MG tablet    klonoPIN    30 tablet    Take 1 tablet (0.5 mg) by mouth 2 times daily as needed for anxiety    BECCA (generalized anxiety disorder)       escitalopram 10 MG tablet    LEXAPRO    90 tablet    Start with 1/2 tablet daily and increase to 1 full tablet in 7-10 days.    BECCA (generalized anxiety disorder)       estradiol 0.0375 MG/24HR BIW patch    VIVELLE-DOT    24 patch    Place 1 patch onto the skin twice a week    Vasomotor symptoms due to menopause       halobetasol 0.05 % ointment    ULTRAVATE          HERBALS      Chinese Herbal supplement: Candie Quiroz Helps with stress and anxiety        mometasone 0.1 % external cream    ELOCON          oxyCODONE-acetaminophen 5-325 MG tablet    PERCOCET     Take 1 tablet by mouth        PROBIOTIC DAILY PO      Take by mouth as needed        progesterone 100 MG capsule    PROMETRIUM    90 capsule    Take 1 capsule (100 mg) by mouth daily    Vasomotor symptoms due to menopause       tacrolimus 0.1 % external ointment    PROTOPIC    60 g    Apply topically 2 times daily     Rash and nonspecific skin eruption       vitamin D 2000 units Caps      Take 2 daily

## 2018-12-03 NOTE — LETTER
"12/3/2018       RE: Casie Harris  [address] [place] [address]     Dear Colleague,    Thank you for referring your patient, Casie Harris, to the [facility] DERMATOLOGY at [facility]. Please see a copy of my visit note below.    [facility] Dermatology Note      Dermatology Problem List:  #Allergic Contact Dermatitis vs. Vulvodynia vs. Lichen Sclerosis - genital/anal area  -will patch test, biopsy if negative     Encounter Date: Dec 3, 2018    CC:   Chief Complaint   Patient presents with     Derm Problem     Casie is here for Lichen Sclerosus flare up.     History of Present Illness:  Ms. Casie Harris is a 58 year old female who presents in self referral for a diagnosis of lichen sclerosis. She states that she had itching discomfort that started approximately 2 years ago in the anal area, which then spread to the vaginal area. She was evaluated by her OBGYN who diagnosed her clinically with lichen sclerosis. She was given a prescription for clobetasol which helped but the patient was not sure how to use it chronically. In February 2018, the patient felt that her symptoms of itch and discomfort were flaring so she went in to the OBGYN who gave her mometasone. She used the mometasone daily for 1 month, then every other day for 1 month, then every third day for 1 month then off. Her symptoms were much improved after this.     The patient is now flaring again, with significant symptoms of itching. She went to her OBGYN again who prescribed clobetasol but it was not covered by her insurance this time so she got halobetasol. She said the halobetasol burned and made her feel \"off\" so she stopped using it and went to the mometasone. She has now been using this daily. She denies any discomfort inside the vagina. She denies any current rashes on the rest of her body currently but has had numerous rashes in the past.       Past Medical " History:   Patient Active Problem List   Diagnosis     Lichen sclerosus et atrophicus     BECCA (generalized anxiety disorder)     Fibrocystic breast changes     Encounter for gynecological examination without abnormal finding     Tinnitus     HSV-1 infection     Complex cyst of right ovary     Chronic bilateral low back pain without sciatica     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     Arthritis      Autoimmune disease (H) 1987    Chronic Fatigue Syndrome     Colon polyps 2014     Generalised anxiety disorder      Hearing problem 2012, 2017     Lichen sclerosus      Sensorineural hearing loss 2012, 2017     Tinnitus April 2017     Past Surgical History:   Procedure Laterality Date     COLONOSCOPY  03/2014    4 polyps, repeat 3-5 yrs,fragments of tubular adenoma     COLONOSCOPY N/A 5/4/2018    Procedure: COMBINED COLONOSCOPY, SINGLE OR MULTIPLE BIOPSY/POLYPECTOMY BY BIOPSY;  Colonoscopy;  Surgeon: Jaycee Day MD;  Location: [facility] OR polyp was hyperplastic - repeeat in 3 yrs because of poor prep     DILATION AND CURETTAGE  1994     HPV  1988    laser surgery  for HPV and was on 5FU     LASER CO2 CERVIX         Social History:   reports that she has never smoked. She has never used smokeless tobacco. She reports that she drinks alcohol. She reports that she does not use illicit drugs.    Family History:  Family History   Problem Relation Age of Onset     Prostate Cancer Father      Heart Disease Father      Cardiovascular Father      AF CHF     Cancer Father      BCC     Neurologic Disorder Father      Cerebrovascular Disease Sister      Lipids Sister      Heart Disease Maternal Grandfather      Heart Disease Paternal Grandmother      Cerebrovascular Disease Mother      Breast Cancer Paternal Aunt      Suicide Sister      Depression Sister      Mental Illness Sister      Breast Cancer Other        Medications:  Current Outpatient Prescriptions   Medication Sig  Dispense Refill     Calcium-Magnesium 250-125 MG TABS (Chewables) take 4 daily       Cholecalciferol (VITAMIN D) 2000 UNIT CAPS Take 2 daily       clonazePAM (KLONOPIN) 0.5 MG tablet Take 1 tablet (0.5 mg) by mouth 2 times daily as needed for anxiety 30 tablet 0     escitalopram (LEXAPRO) 10 MG tablet Start with 1/2 tablet daily and increase to 1 full tablet in 7-10 days. 90 tablet 1     estradiol (VIVELLE-DOT) 0.0375 MG/24HR BIW patch Place 1 patch onto the skin twice a week 24 patch 2     mometasone (ELOCON) 0.1 % cream        Probiotic Product (PROBIOTIC DAILY PO) Take by mouth as needed        progesterone (PROMETRIUM) 100 MG capsule Take 1 capsule (100 mg) by mouth daily 90 capsule 2     halobetasol (ULTRAVATE) 0.05 % ointment        HERBALS Chinese Herbal supplement: Candie Slick Marciatamar Quiroz  Helps with stress and anxiety       oxyCODONE-acetaminophen (PERCOCET) 5-325 MG per tablet Take 1 tablet by mouth          Allergies   Allergen Reactions     Nkda [No Known Drug Allergies]        Review of Systems:  -Constitutional: The patient denies fatigue, fevers, chills, unintended weight loss, and night sweats.  -HEENT: Patient denies nonhealing oral sores.  -Skin: As above in HPI. No additional skin concerns.    Physical exam:  Vitals: LMP 03/25/2012  GEN: This is a well developed, well-nourished female in no acute distress, in a pleasant mood.    SKIN: Focused examination of the genital/anal area was performed.  -Mild erythema of mucosal surface of labia majora. No evidence of scarring.   -Normal genital anatomy  -No other lesions of concern on areas examined.     Impression/Plan:  Patient presents with a 2 year history of itching of the vaginal and anal skin, previously diagnosed as lichen sclerosis. Has responded to topical steroids in the past but lately has not been getting relief with mometasone or halobetasol.     Differential diagnosis in this patient is Allergic Contact Dermatitis vs. Vulvodynia vs. Lichen  Sclerosis. Clinically the patient has minimal erythema, and we do not feel that a biopsy will be helpful today. She could be in the early stages of lichen sclerosis, however it has been two years and you would expect to see more scarring than she has today.    Plan is to patch test as below. If negative, could consider biopsy to evaluate for lichen sclerosis or staining for nerve fibers to evaluate for vulvodynia.     -Samples of Free and Clear soap for cleansing  -Prescription written for Tacrolimus 0.1% ointment. Start using once daily for a week (with using mometasone once daily), then increase to twice daily Tacrolimus.  -Patch testing as below.    Order for PATCH TESTS    [x] Outpatient  [] Inpatient: [place]..../ Bed ....      Skin Atopy (atopic dermatitis) [] Yes   [x] No  Rhinitis/Sinusitis:   [x] Yes   [] No  Allergic Asthma:   [] Yes   [x] No  Food Allergy:   [] Yes   [x] No  Leg ulcers:   [] Yes   [x] No  Hand eczema:   [] Yes   [x] No   Leading hand:   [x] R   [] L       [] Ambidextrous                        Reason for tests (suspected allergy): Unclear; possible additive/preservative in product.  Known previous allergies: None    Standardized panels  [x] Standard panel (40 tests)  [x] Preservatives & Antimicrobials (31 tests)  [x] Emulsifiers & Additives (25 tests)   [] Perfumes/Flavours & Plants (25 tests)  [] Hairdresser panel (12 tests)  [] Rubber Chemicals (22 tests)  [] Plastics (26 tests)  [] Colorants/Dyes/Food additives (20 tests)  [] Metals (implants/dental) (23 tests)  [] Local anaesthetics/NSAIDs (12 tests)  [] Antibiotics & Antimycotics (14 tests)   [x] Corticosteroids (15 tests)   [] Photopatch test (32 tests)   [] others: ...      [] Patient's own products: ...    DO NOT test if chemical or biological identity is unknown!     always ask from patient the product information and safety sheets (MSDS)     [] Patient needs consultation with Allergy team (changes of tests may apply)  [] Tests  discussed with Allergy team (can have direct appointment for patch tests)      Follow-up for patch testing appointment as above.        staffed the patient.    Staff Involved:  Resident(Vianney Mednia)/Staff(as above)    Staff Physician Comments:  I saw and evaluated the patient with the resident and I agree with the assessment and plan as documented in the resident's note.    I spent a total of 30 min face to face with Casie Harris during today's office visit. About 50% of the time was spent counseling the patient and/or coordinating care regarding their allergy.    Jarvis Jones MD

## 2018-12-03 NOTE — NURSING NOTE
Dermatology Rooming Note    Casie Harris's goals for this visit include:   Chief Complaint   Patient presents with     Derm Problem     Casie is here for Lichen Sclerosus flare up.     Rosa Diego, CMA

## 2018-12-03 NOTE — PROGRESS NOTES
"[facility] Dermatology Note      Dermatology Problem List:  #Allergic Contact Dermatitis vs. Vulvodynia vs. Lichen Sclerosis - genital/anal area  -will patch test, biopsy if negative     Encounter Date: Dec 3, 2018    CC:   Chief Complaint   Patient presents with     Derm Problem     Casie is here for Lichen Sclerosus flare up.     History of Present Illness:  Ms. Casie Harris is a 58 year old female who presents in self referral for a diagnosis of lichen sclerosis. She states that she had itching discomfort that started approximately 2 years ago in the anal area, which then spread to the vaginal area. She was evaluated by her OBGYN who diagnosed her clinically with lichen sclerosis. She was given a prescription for clobetasol which helped but the patient was not sure how to use it chronically. In February 2018, the patient felt that her symptoms of itch and discomfort were flaring so she went in to the OBGYN who gave her mometasone. She used the mometasone daily for 1 month, then every other day for 1 month, then every third day for 1 month then off. Her symptoms were much improved after this.     The patient is now flaring again, with significant symptoms of itching. She went to her OBGYN again who prescribed clobetasol but it was not covered by her insurance this time so she got halobetasol. She said the halobetasol burned and made her feel \"off\" so she stopped using it and went to the mometasone. She has now been using this daily. She denies any discomfort inside the vagina. She denies any current rashes on the rest of her body currently but has had numerous rashes in the past.       Past Medical History:   Patient Active Problem List   Diagnosis     Lichen sclerosus et atrophicus     BECCA (generalized anxiety disorder)     Fibrocystic breast changes     Encounter for gynecological examination without abnormal finding     Tinnitus     HSV-1 infection     Complex cyst of right ovary     " Chronic bilateral low back pain without sciatica     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     Arthritis      Autoimmune disease (H) 1987    Chronic Fatigue Syndrome     Colon polyps 2014     Generalised anxiety disorder      Hearing problem 2012, 2017     Lichen sclerosus      Sensorineural hearing loss 2012, 2017     Tinnitus April 2017     Past Surgical History:   Procedure Laterality Date     COLONOSCOPY  03/2014    4 polyps, repeat 3-5 yrs,fragments of tubular adenoma     COLONOSCOPY N/A 5/4/2018    Procedure: COMBINED COLONOSCOPY, SINGLE OR MULTIPLE BIOPSY/POLYPECTOMY BY BIOPSY;  Colonoscopy;  Surgeon: Jaycee Day MD;  Location: [facility] polyp was hyperplastic - repeeat in 3 yrs because of poor prep     DILATION AND CURETTAGE  1994     HPV  1988    laser surgery  for HPV and was on 5FU     LASER CO2 CERVIX         Social History:   reports that she has never smoked. She has never used smokeless tobacco. She reports that she drinks alcohol. She reports that she does not use illicit drugs.    Family History:  Family History   Problem Relation Age of Onset     Prostate Cancer Father      Heart Disease Father      Cardiovascular Father      AF CHF     Cancer Father      BCC     Neurologic Disorder Father      Cerebrovascular Disease Sister      Lipids Sister      Heart Disease Maternal Grandfather      Heart Disease Paternal Grandmother      Cerebrovascular Disease Mother      Breast Cancer Paternal Aunt      Suicide Sister      Depression Sister      Mental Illness Sister      Breast Cancer Other        Medications:  Current Outpatient Prescriptions   Medication Sig Dispense Refill     Calcium-Magnesium 250-125 MG TABS (Chewables) take 4 daily       Cholecalciferol (VITAMIN D) 2000 UNIT CAPS Take 2 daily       clonazePAM (KLONOPIN) 0.5 MG tablet Take 1 tablet (0.5 mg) by mouth 2 times daily as needed for anxiety 30 tablet 0     escitalopram (LEXAPRO) 10 MG  tablet Start with 1/2 tablet daily and increase to 1 full tablet in 7-10 days. 90 tablet 1     estradiol (VIVELLE-DOT) 0.0375 MG/24HR BIW patch Place 1 patch onto the skin twice a week 24 patch 2     mometasone (ELOCON) 0.1 % cream        Probiotic Product (PROBIOTIC DAILY PO) Take by mouth as needed        progesterone (PROMETRIUM) 100 MG capsule Take 1 capsule (100 mg) by mouth daily 90 capsule 2     halobetasol (ULTRAVATE) 0.05 % ointment        HERBALS Chinese Herbal supplement: Candie Quiroz  Helps with stress and anxiety       oxyCODONE-acetaminophen (PERCOCET) 5-325 MG per tablet Take 1 tablet by mouth          Allergies   Allergen Reactions     Nkda [No Known Drug Allergies]        Review of Systems:  -Constitutional: The patient denies fatigue, fevers, chills, unintended weight loss, and night sweats.  -HEENT: Patient denies nonhealing oral sores.  -Skin: As above in HPI. No additional skin concerns.    Physical exam:  Vitals: LMP 03/25/2012  GEN: This is a well developed, well-nourished female in no acute distress, in a pleasant mood.    SKIN: Focused examination of the genital/anal area was performed.  -Mild erythema of mucosal surface of labia majora. No evidence of scarring.   -Normal genital anatomy  -No other lesions of concern on areas examined.     Impression/Plan:  Patient presents with a 2 year history of itching of the vaginal and anal skin, previously diagnosed as lichen sclerosis. Has responded to topical steroids in the past but lately has not been getting relief with mometasone or halobetasol.     Differential diagnosis in this patient is Allergic Contact Dermatitis vs. Vulvodynia vs. Lichen Sclerosis. Clinically the patient has minimal erythema, and we do not feel that a biopsy will be helpful today. She could be in the early stages of lichen sclerosis, however it has been two years and you would expect to see more scarring than she has today.    Plan is to patch test as below. If  negative, could consider biopsy to evaluate for lichen sclerosis or staining for nerve fibers to evaluate for vulvodynia.     -Samples of Free and Clear soap for cleansing  -Prescription written for Tacrolimus 0.1% ointment. Start using once daily for a week (with using mometasone once daily), then increase to twice daily Tacrolimus.  -Patch testing as below.    Order for PATCH TESTS    [x] Outpatient  [] Inpatient: [place]..../ Bed ....      Skin Atopy (atopic dermatitis) [] Yes   [x] No  Rhinitis/Sinusitis:   [x] Yes   [] No  Allergic Asthma:   [] Yes   [x] No  Food Allergy:   [] Yes   [x] No  Leg ulcers:   [] Yes   [x] No  Hand eczema:   [] Yes   [x] No   Leading hand:   [x] R   [] L       [] Ambidextrous                        Reason for tests (suspected allergy): Unclear; possible additive/preservative in product.  Known previous allergies: None    Standardized panels  [x] Standard panel (40 tests)  [x] Preservatives & Antimicrobials (31 tests)  [x] Emulsifiers & Additives (25 tests)   [] Perfumes/Flavours & Plants (25 tests)  [] Hairdresser panel (12 tests)  [] Rubber Chemicals (22 tests)  [] Plastics (26 tests)  [] Colorants/Dyes/Food additives (20 tests)  [] Metals (implants/dental) (23 tests)  [] Local anaesthetics/NSAIDs (12 tests)  [] Antibiotics & Antimycotics (14 tests)   [x] Corticosteroids (15 tests)   [] Photopatch test (32 tests)   [] others: ...      [] Patient's own products: ...    DO NOT test if chemical or biological identity is unknown!     always ask from patient the product information and safety sheets (MSDS)     [] Patient needs consultation with Allergy team (changes of tests may apply)  [] Tests discussed with Allergy team (can have direct appointment for patch tests)      Follow-up for patch testing appointment as above.        staffed the patient.    Staff Involved:  Resident(Vianney Medina)/Staff(as above)    Staff Physician Comments:  I saw and evaluated the patient with  the resident and I agree with the assessment and plan as documented in the resident's note.    Jarvis Jones MD  Professor  Head of Dermato-Allergy Division  Department of Dermatology  [facility], [facility]  I spent a total of 30 min face to face with Casie Harris during today's office visit. About 50% of the time was spent counseling the patient and/or coordinating care regarding their allergy.

## 2018-12-05 ENCOUNTER — MYC MEDICAL ADVICE (OUTPATIENT)
Dept: DERMATOLOGY | Facility: CLINIC | Age: 58
End: 2018-12-05

## 2018-12-11 ENCOUNTER — THERAPY VISIT (OUTPATIENT)
Dept: PHYSICAL THERAPY | Facility: CLINIC | Age: 58
End: 2018-12-11
Payer: COMMERCIAL

## 2018-12-11 DIAGNOSIS — M54.50 CHRONIC BILATERAL LOW BACK PAIN WITHOUT SCIATICA: ICD-10-CM

## 2018-12-11 DIAGNOSIS — G89.29 CHRONIC BILATERAL LOW BACK PAIN WITHOUT SCIATICA: ICD-10-CM

## 2018-12-11 PROCEDURE — 97530 THERAPEUTIC ACTIVITIES: CPT | Mod: GP | Performed by: PHYSICAL THERAPIST

## 2018-12-11 PROCEDURE — 97110 THERAPEUTIC EXERCISES: CPT | Mod: GP | Performed by: PHYSICAL THERAPIST

## 2018-12-11 NOTE — PROGRESS NOTES
Subjective:  HPI                    Objective:  System    Physical Exam    General     ROS    Assessment/Plan:    PROGRESS  REPORT    Progress reporting period is from 10/9/18 to 12/11/18.       SUBJECTIVE  Subjective changes noted by patient:  .  Subjective: Pt states that she has worked with a dermatologist and her OB/GYN MD re:  tingling in anterior abdominal/pelvic region.  May be related to her use of estrogen.  as DCed and has noted a change in behavior of sx.  Feels more comfortable with her back.  Is able to note gains is strength.    Current pain level is   .     Previous pain level was    .   Changes in function:  Yes (See Goal flowsheet attached for changes in current functional level)  Adverse reaction to treatment or activity: None    OBJECTIVE  Changes noted in objective findings:    Objective: Active throacic rotation improved.  Cont to work to (+) core without having her set or tense core to avoid over activation into pelvic floor.  Pt to work on current ex program for 4 wks.  Check back in that time for progression, possible DC.     ASSESSMENT/PLAN  Updated problem list and treatment plan: Diagnosis 1:  LBP without sciatica  Pain -  manual therapy, self management, education and home program  Decreased ROM/flexibility - manual therapy and therapeutic exercise  Decreased joint mobility - manual therapy and therapeutic exercise  Decreased strength - therapeutic exercise and therapeutic activities  Impaired muscle performance - neuro re-education  Decreased function - therapeutic activities  Impaired posture - neuro re-education  STG/LTGs have been met or progress has been made towards goals:  Yes (See Goal flow sheet completed today.)  Assessment of Progress: The patient's condition is improving.  Self Management Plans:  Patient has been instructed in a home treatment program.  Patient  has been instructed in self management of symptoms.    Casie continues to require the following intervention to meet  STG and LTG's:  PT    Recommendations:  This patient would benefit from continued therapy.     Frequency:  1 X week, once daily  Duration:  for 2 visits        Please refer to the daily flowsheet for treatment today, total treatment time and time spent performing 1:1 timed codes.

## 2018-12-13 ENCOUNTER — TELEPHONE (OUTPATIENT)
Dept: DERMATOLOGY | Facility: CLINIC | Age: 58
End: 2018-12-13

## 2018-12-13 NOTE — TELEPHONE ENCOUNTER
----- Message from Akiko Whitman LPN sent at 12/5/2018  2:37 PM CST -----  Please see below.     Thanks!    ===View-only below this line===    ----- Message -----     From: Casie Tsangangela     Sent: 12/5/2018   2:26 PM       To: [facility]  Subject: A follow-up question for a visit within the #    Hi Dr. Jones:    I tried the tacrolimus Ointment 0.1% last night as prescribed but I don't think I should use this again. I've had considerable burning ever since (it is now about 2:30 the next day) along with burning when I urinate, and overall it has made things worse. I also used the mometasone this morning and think maybe I should stick with that, but not knowing if I should use it twice a day until I see you next on Monday or just at night as I have been for the last several weeks.    I'm pretty uncomfortable with a lot of burning along with the usual itching. How long does the burning from the tacrolimus last? Anything else I can do for quick symptom relief?    Also, just for your information, I got thinking about what you were saying about a possible allergic reaction to something and I've noticed that I've been reacting lately to the estradiol patch I put on twice weekly (when I remove it, I have a raised red symone for several days). I took it off a couple days ago and the tingling in my back and abdomen has gone away. Wondering if this may be the offender?    I see you on Monday for allergy testing but if you have any suggestions about how to get more relief now I could use it.    Thanks, Casie Anne

## 2019-01-04 ENCOUNTER — THERAPY VISIT (OUTPATIENT)
Dept: PHYSICAL THERAPY | Facility: CLINIC | Age: 59
End: 2019-01-04
Payer: COMMERCIAL

## 2019-01-04 DIAGNOSIS — M54.50 CHRONIC BILATERAL LOW BACK PAIN WITHOUT SCIATICA: ICD-10-CM

## 2019-01-04 DIAGNOSIS — G89.29 CHRONIC BILATERAL LOW BACK PAIN WITHOUT SCIATICA: ICD-10-CM

## 2019-01-04 PROCEDURE — 97530 THERAPEUTIC ACTIVITIES: CPT | Mod: GP | Performed by: PHYSICAL THERAPIST

## 2019-01-04 PROCEDURE — 97110 THERAPEUTIC EXERCISES: CPT | Mod: GP | Performed by: PHYSICAL THERAPIST

## 2019-02-22 ENCOUNTER — THERAPY VISIT (OUTPATIENT)
Dept: PHYSICAL THERAPY | Facility: CLINIC | Age: 59
End: 2019-02-22
Payer: COMMERCIAL

## 2019-02-22 DIAGNOSIS — G89.29 CHRONIC BILATERAL LOW BACK PAIN WITHOUT SCIATICA: ICD-10-CM

## 2019-02-22 DIAGNOSIS — M54.50 CHRONIC BILATERAL LOW BACK PAIN WITHOUT SCIATICA: ICD-10-CM

## 2019-02-22 PROCEDURE — 97530 THERAPEUTIC ACTIVITIES: CPT | Mod: GP | Performed by: PHYSICAL THERAPIST

## 2019-02-22 PROCEDURE — 97110 THERAPEUTIC EXERCISES: CPT | Mod: GP | Performed by: PHYSICAL THERAPIST

## 2019-02-22 NOTE — PROGRESS NOTES
Subjective:  HPI                    Objective:  System    Physical Exam    General     ROS    Assessment/Plan:    PROGRESS  REPORT    Progress reporting period is from 12/11/18 to 2/22/19.       SUBJECTIVE  Subjective changes noted by patient:  .  Subjective: Pt states that she is feeling better.  States that started on [place]in 1/19.  Has noted improvement in both back pain and anterior tingling.  Is most interested in starting to work on core strength.    Current pain level is   .     Previous pain level was    .   Changes in function:  Yes (See Goal flowsheet attached for changes in current functional level)  Adverse reaction to treatment or activity: None    OBJECTIVE  Changes noted in objective findings:    Objective: Active thoracic rotation-WNL B.  Notes pulling into mid thoracic spine with slump on the left, (-) on the right.  Given ex to address core weakness.  Pt plans to work on and return to PT as indicated.     ASSESSMENT/PLAN  Updated problem list and treatment plan: Diagnosis 1:  LBP without sciatica  Pain -  manual therapy, self management, education and home program  Decreased ROM/flexibility - manual therapy and therapeutic exercise  Decreased joint mobility - manual therapy and therapeutic exercise  Decreased strength - therapeutic exercise and therapeutic activities  Impaired muscle performance - neuro re-education  Decreased function - therapeutic activities  Impaired posture - neuro re-education  STG/LTGs have been met or progress has been made towards goals:  Yes (See Goal flow sheet completed today.)  Assessment of Progress: The patient's condition is improving.  Self Management Plans:  Patient has been instructed in a home treatment program.  Patient  has been instructed in self management of symptoms.    Casie continues to require the following intervention to meet STG and LTG's:  PT    Recommendations:  Pt to work on ex and return for progression of ex as indicated.    Please refer to the  daily flowsheet for treatment today, total treatment time and time spent performing 1:1 timed codes.

## 2019-03-20 ENCOUNTER — TELEPHONE (OUTPATIENT)
Dept: INTERNAL MEDICINE | Facility: CLINIC | Age: 59
End: 2019-03-20

## 2019-03-20 NOTE — TELEPHONE ENCOUNTER
Fax received from [employer] on [facility]. That plan does not cover Lexapro 10 mg tablets.  Please call [phone] to initiate prior authorization or call pharmacy to change medication.  Patient ID is [id number].     Prior Authorization Retail Medication Request    Medication/Dose: Lexapro  ICD code (if different than what is on RX):    Previously Tried and Failed:    Rationale:      Insurance Name:    Coverage information:     Subscriber: [id number] MARY JANE EM     Rel to sub: 02 - Spouse     Member ID: [id number]     Payor: 10-[employer] Ph: [phone]     Benefit plan: [id number] O Ph: [phone]     Group number: [id number]     Member effective dates: from 01/01/18                      Ramandeep [place] RN on 3/20/2019 at 5:13 PM

## 2019-03-21 NOTE — TELEPHONE ENCOUNTER
Central Prior Authorization Team   Phone: [phone]      PA Initiation    Medication: escitalopram (LEXAPRO) 10 MG tablet-PA Pending  Insurance Company: [employer] - Phone [phone] Fax [phone]  Pharmacy Filling the Rx: [facility] [id number] [place], [place] - [address] [place] [address] & [address]  Filling Pharmacy Phone: [phone]  Filling Pharmacy Fax: [phone]  Start Date: 3/21/2019

## 2019-03-29 NOTE — TELEPHONE ENCOUNTER
Prior Authorization Not Needed per Insurance    Medication: escitalopram (LEXAPRO) 10 MG tablet-PA not needed  Insurance Company: [employer] - Phone [phone] Fax [phone]  Expected CoPay:      Pharmacy Filling the Rx: [facility] [address] [place] [address] - [address] [place] [address] & [address]  Pharmacy Notified: No  Patient Notified: No    Per call to insurance to check on PA status, rep stated PA is not needed and pharmacy got a paid claim on 3/22/19. Disregarding request.

## 2019-04-10 ENCOUNTER — ANCILLARY PROCEDURE (OUTPATIENT)
Dept: MAMMOGRAPHY | Facility: CLINIC | Age: 59
End: 2019-04-10
Attending: FAMILY MEDICINE
Payer: COMMERCIAL

## 2019-04-10 DIAGNOSIS — Z12.39 SCREENING BREAST EXAMINATION: ICD-10-CM

## 2019-05-22 ENCOUNTER — OFFICE VISIT (OUTPATIENT)
Dept: FAMILY MEDICINE | Facility: CLINIC | Age: 59
End: 2019-05-22
Payer: COMMERCIAL

## 2019-05-22 VITALS
BODY MASS INDEX: 22.15 KG/M2 | TEMPERATURE: 97.5 F | HEIGHT: 71 IN | SYSTOLIC BLOOD PRESSURE: 106 MMHG | WEIGHT: 158.2 LBS | OXYGEN SATURATION: 100 % | HEART RATE: 71 BPM | DIASTOLIC BLOOD PRESSURE: 72 MMHG | RESPIRATION RATE: 16 BRPM

## 2019-05-22 DIAGNOSIS — G62.9 NEUROPATHY: ICD-10-CM

## 2019-05-22 DIAGNOSIS — Z86.59 HISTORY OF ANXIETY: ICD-10-CM

## 2019-05-22 DIAGNOSIS — N94.819 VULVODYNIA: ICD-10-CM

## 2019-05-22 DIAGNOSIS — N89.8 VAGINAL DRYNESS: ICD-10-CM

## 2019-05-22 DIAGNOSIS — Z00.00 ANNUAL PHYSICAL EXAM: Primary | ICD-10-CM

## 2019-05-22 RX ORDER — GABAPENTIN 100 MG/1
100 CAPSULE ORAL
Qty: 60 CAPSULE | Refills: 1 | Status: SHIPPED | OUTPATIENT
Start: 2019-05-22 | End: 2020-04-01

## 2019-05-22 RX ORDER — ESTRADIOL 10 UG/1
10 INSERT VAGINAL
Qty: 24 TABLET | Refills: 3 | Status: SHIPPED | OUTPATIENT
Start: 2019-05-23 | End: 2020-04-01

## 2019-05-22 RX ORDER — GABAPENTIN 100 MG/1
300 CAPSULE ORAL 2 TIMES DAILY
COMMUNITY
End: 2020-04-01

## 2019-05-22 SDOH — HEALTH STABILITY: PHYSICAL HEALTH: ON AVERAGE, HOW MANY MINUTES DO YOU ENGAGE IN EXERCISE AT THIS LEVEL?: 30 MIN

## 2019-05-22 SDOH — HEALTH STABILITY: MENTAL HEALTH
STRESS IS WHEN SOMEONE FEELS TENSE, NERVOUS, ANXIOUS, OR CAN'T SLEEP AT NIGHT BECAUSE THEIR MIND IS TROUBLED. HOW STRESSED ARE YOU?: TO SOME EXTENT

## 2019-05-22 SDOH — HEALTH STABILITY: PHYSICAL HEALTH: ON AVERAGE, HOW MANY DAYS PER WEEK DO YOU ENGAGE IN MODERATE TO STRENUOUS EXERCISE (LIKE A BRISK WALK)?: 7 DAYS

## 2019-05-22 ASSESSMENT — ENCOUNTER SYMPTOMS
STIFFNESS: 1
MUSCLE WEAKNESS: 0
DECREASED LIBIDO: 1
HOT FLASHES: 1
MUSCLE CRAMPS: 0
JOINT SWELLING: 0
ARTHRALGIAS: 0
MYALGIAS: 1
BACK PAIN: 1
NECK PAIN: 1

## 2019-05-22 ASSESSMENT — MIFFLIN-ST. JEOR: SCORE: 1393.72

## 2019-05-22 ASSESSMENT — PAIN SCALES - GENERAL: PAINLEVEL: NO PAIN (0)

## 2019-05-22 NOTE — PROGRESS NOTES
"[facility]  Primary Care [place]   Sherice Jamil MD PhD  05/22/2019      Chief Complaint:   Physical     History of Present Illness:   Casie Harris is a 58 year old female with a history of right ovarian cyst, HPV, and chronic bilateral low back pain who presents for an annual physical exam.    Reproductive Health   She has a history of tingling in her lower back and abdomen, as well as vaginal dryness. She had surgery with Dr. Vela at [facility] in September 2018 to remove a right ovarian cyst, her right ovary, both her fallopian tubes, and a large fibroid. The patient decided to keep her left ovary. Additionally, she was on hormone replacement therapy, but was advised to discontinue by Dr. Vela. She completely stopped the therapy in March 2019. She was started on an estrogen cream, but it worsened the tingling and caused bloating. A dermatologist at the [facility] told her she may be allergic to some of the creams or ointments she was using, which led her to discontinue use. She has not used the cream for 3-4 days. She has used Vagifem in the past for treatment, and did not have problems with the medication. She is currently having increased abdominal tingling, tightness, and pulling, which Dr. Vela noted is likely from adhesions from the surgery. She was started on gabapentin in March, which has had some lasting effect. However, she expresses that she would like to decrease her dose, as she would prefer to take minimal medications. Additionally, she has had increased vaginal burning and pain after the surgery. Denies any vaginal bleeding. She had a previous misdiagnosis of lichen sclerosus, and was recently diagnosed with vulvodynia. Of note, she saw PT last summer, who discussed with her that her pelvic floor muscles are \"tight.\" She would like to see someone in PT who specializes in pelvic floor treatment.     Health Care Maintenance:  -Lipids: Last panel was on 1/17/2019 and total cholesterol " was elevated at 218.   -Diabetes screening: Last A1c was on 2/1/2019 and was 5.3.   -Colonoscopy: Last colonoscopy was in May 2018, and she had a large rectal polyp removed (see below). She was advised to return in 3 years.   -Screening: Last mammogram was on 4/10/2019 and was unremarkable (see below). Last pap smear was on December 2018, and was normal. She sees an eye doctor regularly.   -Diet and Exercise: She is trying to eat more dairy, such as yogurt and kefir. Additionally, she is walking and stretching for 30-40 minutes everyday     Review of Systems:   Pertinent items are noted in HPI or as in patient entered ROS below, remainder of complete ROS is negative.  Answers for HPI/ROS submitted by the patient on 5/22/2019   General Symptoms: No  Skin Symptoms: No  HENT Symptoms: No  EYE SYMPTOMS: No  HEART SYMPTOMS: No  LUNG SYMPTOMS: No  INTESTINAL SYMPTOMS: No  URINARY SYMPTOMS: No  GYNECOLOGIC SYMPTOMS: Yes  BREAST SYMPTOMS: No  SKELETAL SYMPTOMS: Yes  BLOOD SYMPTOMS: No  NERVOUS SYSTEM SYMPTOMS: No  MENTAL HEALTH SYMPTOMS: No  Back pain: Yes  Muscle aches: Yes  Neck pain: Yes  Swollen joints: No  Joint pain: No  Bone pain: No  Muscle cramps: No  Muscle weakness: No  Joint stiffness: Yes  Bone fracture: No  Bleeding or spotting between periods: No  Heavy or painful periods: No  Irregular periods: No  Vaginal discharge: No  Hot flashes: Yes  Vaginal dryness: Yes  Genital ulcers: No  Reduced libido: Yes  Painful intercourse: Yes  Difficulty with sexual arousal: No  Post-menopausal bleeding: No  Night Sweats: Yes  Tingling: Yes     Active Medications:      Cholecalciferol (VITAMIN D) 2000 UNIT CAPS, Take 2 daily, Disp: , Rfl:      escitalopram (LEXAPRO) 10 MG tablet, Start with 1/2 tablet daily and increase to 1 full tablet in 7-10 days., Disp: 90 tablet, Rfl: 1     [START ON 5/23/2019] estradiol (VAGIFEM) 10 MCG TABS vaginal tablet, Place 1 tablet (10 mcg) vaginally twice a week, Disp: 24 tablet, Rfl: 3       "gabapentin (NEURONTIN) 100 MG capsule, Take 1 capsule (100 mg) by mouth 2 times daily, Disp: 60 capsule, Rfl: 1     Probiotic Product (PROBIOTIC DAILY PO), Take by mouth as needed , Disp: , Rfl:       Allergies:   Nkda [no known drug allergies]      Past Medical History:  HPV  Chronic fatigue syndrome   Colon polyps     Sensorineural hearing loss   Tinnitus   Cyst of right ovary   Chronic bilateral low back pain w/o sciatica   Abnormal pap smear     Past Surgical History:  Colonoscopy x2  Dilation and curettage   HPV laser surgery   Right ovarian cyst and ovary removed, large fibroid removed, and bilateral fallopian tube removed, September 2018     Family History:   Father - prostate cancer, heart disease, BCC, neurologic disorder   Sister - cerebrovascular disease , hyperlipidemia, suicide, depression  Maternal grandfather - heart disease   Paternal grandmother - heart disease   Paternal aunt - breast cancer   Other - breast cancer       Social History:   The patient was alone.  Smoking Status: Never smoker    Smokeless Tobacco: Never used   Alcohol Use: Yes, rare  Marital Status:   Employment status: Employed      Physical Exam:   /72 (BP Location: Right arm, Patient Position: Sitting, Cuff Size: Adult Regular)   Pulse 71   Temp 97.5  F (36.4  C) (Oral)   Resp 16   Ht 1.803 m (5' 11\")   Wt 71.8 kg (158 lb 3.2 oz)   LMP 03/25/2012   SpO2 100%   BMI 22.06 kg/m       Gen:  Female in NAD  HEENT: PERRL  Ears clear with good light reflex.  OP MMM no lesions.  No drainage  Neck  Supple.  No carotid bruits.  No LAD  Breasts: no masses, no axillary nodes   CVS exam: S1, S2 normal, no murmur, click, rub or gallop, regular rate and rhythm, chest is clear without rales or wheezing, no pedal edema, no JVD, no hepatosplenomegaly.  Respiratory: Normal - Clear to auscultation without rales, rhonchi, or wheezing.  Abd: Soft ND NT  No masses or HSM  Pelvic: done by Dr Vela  5/6/2019 no pap done at that time " note 8/2018 by Dr Vela that pap is UTD   Ext:   Good pulses. No edema  Musculoskeletal: spine is straight, extremities full ROM, no deformity  Neuro: Neurological exam reveals normal without focal findings, mental status, speech normal, alert and oriented x iii, RENETTA, reflexes normal and symmetric.     PHQ-9 score:  1  BECCA-7 score: 2    Recent Labs     From 1/17/2019 results   SODIUM 135 - 145 mmol/L 137    POTASSIUM 3.5 - 5.0 mmol/L 4.1    CHLORIDE 98 - 110 mmol/L 102    CO2,TOTAL 21 - 31 mmol/L 25    ANION GAP 5 - 18  10    GLUCOSE 65 - 100 mg/dL 103High     CALCIUM 8.5 - 10.5 mg/dL 9.8    BUN 8 - 25 mg/dL 10    CREATININE 0.57 - 1.11 mg/dL 0.84    BUN/CREAT RATIO           10 - 20  12    GFR if African American >60 ml/min/1.73m2 >60    GFR if not African American >60 ml/min/1.73m2 >60    ALBUMIN 3.5 - 5.2 g/dL 4.7    PROTEIN,TOTAL 6.0 - 8.0 g/dL 7.3    GLOBULIN                  2.0 - 3.7 g/dL 2.6    A/G RATIO 1.0 - 2.0  1.8    BILIRUBIN,TOTAL 0.2 - 1.2 mg/dL 0.6    ALK PHOSPHATASE 50 - 136 IU/L 54    ALT (SGPT) 8 - 45 IU/L 18    AST (SGOT) 2 - 40 IU/L 12      TSH 0.35 - 4.94 uIU/mL 1.19      CHOLESTEROL,TOTAL 100 - 199 mg/dL 218High     TRIGLYCERIDES <150 mg/dL 73    HDL CHOLESTEROL >40 mg/dL 79    NON-HDL CHOLESTEROL <145 mg/dl 139    CHOL/HDL RATIO            <4.50  2.76    LDL CHOLESTEROL <=130 mg/dL 124      VITAMIN D TOTAL 30.0 - 80.0 ng/mL 42.7      VITAMIN B12 180 - 914 pg/mL 1,039High       ANTINUCLEAR ANTIBODY (BRISA) Negative  Negative      C-REACTIVE PROTEIN        <0.50 mg/dL 0.02      SEDIMENTATION RATE        <30 mm/hr 7      WHITE BLOOD COUNT         4.5 - 11.0 thou/cu mm 8.2    RED BLOOD COUNT           4.00 - 5.20 mil/cu mm 4.66    HEMOGLOBIN                12.0 - 16.0 g/dL 14.4    HEMATOCRIT                33.0 - 51.0 % 43.6    MCV                       80 - 100 fL 94    MCH                       26.0 - 34.0 pg 30.9    MCHC                      32.0 - 36.0 g/dL 33.0    RDW                        11.5 - 15.5 % 12.4    PLATELET COUNT            140 - 440 thou/cu mm 251    MPV                       6.5 - 11.0 fL 9.6    NEUTROPHILS                % 74.2    LYMPHOCYTES                % 21.2    MONOCYTES                  % 3.8    EOSINOPHILS                % 0.2    BASOPHILS                  % 0.4    IMMATURE GRANULOCYTES(METAS,MYELOS,PROS)  % 0.2    ABSOLUTE NEUTROPHILS      1.7 - 7.0 thou/cu mm 6.1    ABSOLUTE LYMPHOCYTES      0.9 - 2.9 thou/cu mm 1.7    ABSOLUTE MONOCYTES        <0.9 thou/cu mm 0.3    ABSOLUTE EOSINOPHILS      <0.5 thou/cu mm 0.0    ABSOLUTE BASOPHILS        <0.3 thou/cu mm 0.0    ABSOLUTE IMMATURE GRANULOCYTES(METAS,MYELOS,PROS) <0.3 thou/cu mm 0.0      From 2/1/2019 results  METHYLMALONIC ACID, QN <=0.40 nmol/mL 0.12      HOMOCYSTEINE,CARDIAC      5.0 - 15.4 umol/L 9.9      HEMOGLOBIN A1C SCREENING <6.4 % 5.3       Recent Imaging  From colonoscopy note on 5/4/18  Impression:          - One 2 mm, non-bleeding polyp in the rectum, removed                        with a jumbo cold forceps. Resected and retrieved.                        - Tortuous colon.                        - Internal hemorrhoids.   Jaycee Day MD   From mammogram note on 4/10  IMPRESSION: BI-RADS CATEGORY: 1 -  NEGATIVE.     RECOMMENDED FOLLOW-UP: Annual Mammography     Results to be sent to the patient.      I have personally reviewed the examination and initial interpretation  and I agree with the findings.     LISA MAYER MD     Assessment and Plan: 59 yo PM female who comes in for annual exam and is having pelvic floor tingling and vaginal dryness.      Annual physical exam  Discussed preventative health measures and documented recent screenings. Recommend shingrix. PAP and pelvic was done by Dr Vela ([facility]). Discussed colonoscopy 5/2018 (hx of polyps). Discussed calcium and vitamin D.     Vaginal dryness  She has had a history of vaginal dryness, and experienced bloating and tingling from estrogen cream use. She has  used Vagifem in the past with no side effects and would like to try this   - estradiol (VAGIFEM) 10 MCG TABS vaginal tablet  Dispense: 24 tablet; Refill: 3    History of anxiety  She has a history of anxiety, but has controlled well with Lexapro 10mg. BECCA =2 today    Neuropathy  She has tingling in her abdomen and lower back, and started on gabapentin in March. She would like to try a lower dose.  Dosage decreased from 300mg BID to 100mg BID, per patient request.   - gabapentin (NEURONTIN) 100 MG capsule  Dispense: 60 capsule; Refill: 1  - PHYSICAL THERAPY REFERRAL    Vulvodynia  She was told by a physical therapist that she has tight pelvic floor muscles. She would like to see a PT specialist for this.   - PHYSICAL THERAPY REFERRAL for pelvic floor disorder.     Scribe Disclosure:  I, Shefali Gibbons, am serving as a scribe to document services personally performed by Sherice Jamil MD PhD at this visit, based upon the provider's statements to me. All documentation has been reviewed by the aforementioned provider prior to being entered into the official medical record.     Portions of this medical record were completed by a scribe. UPON MY REVIEW AND AUTHENTICATION BY ELECTRONIC SIGNATURE, this confirms (a) I performed the applicable clinical services, and (b) the record is accurate.

## 2019-05-22 NOTE — PATIENT INSTRUCTIONS
[facility] Care Center Medication Refill Request Information:  * Please contact your pharmacy regarding ANY request for medication refills.  ** [facility] Prescription Fax = [phone]  * Please allow 3 business days for routine medication refills.  * Please allow 5 business days for controlled substance medication refills.     [facility] Center Test Result notification information:  *You will be notified with in 7-10 days of your appointment day regarding the results of your test.  If you are on MyChart you will be notified as soon as the provider has reviewed the results and signed off on them.    Primary Care Center: [phone]     The pelvic floor disorder order was placed - it is in the physical therapy order.   Get Shingrex from pharmacy - [facility] or [facility]     Nutritious diet  Eat 1200 mg calcium total every day.  Many people achieve this by a diet containing calcium (milk, yogurt, cheese, and other dairy products, supplemented food) and 1 calcium pill. If you don't eat dairy, you should take a calcium supplement 2 times a day.  Get 1000 IU of vitamin D on daily basis.    Eat a variety of fruits and vegetables and eat whole grains.  Try to choose foods with a high NuVal score, if your grocery store shows this number. High numbers, like 80 or 90, are nutritious foods, and low scores, like 10 are less nutritious foods.          Men should drink no more than 2 alcoholic beverages daily on average; women should drink no more than 1 alcoholic beverage daily on average.    Everyone should avoid all tobacco and nicotine-containing products.    Exercise: Aim for:  Moderate activity (where you can still talk while doing it, such as walking about 100 steps per minute, or 3,000 steps in 30 minutes) for 30 minutes at least 5 days a week  Or  Vigorous exercise (you are working so hard you are breathing hard and fast and your heart rate has gone up, such as playing basketball or soccer) at least 75 minutes weekly    If you have  trouble doing 30 minutes of activity, all at once, try small bursts of activity. Go to [email or web address]rape for suggestions on how you can do this at home or work.     All adults should try to do muscle strengthening exercise at least 2 days a week    Safety  Always wear bike/motorcycle helmet and seatbelt in a vehicle  Make sure your home has smoke and carbon monoxide detectors.  Wear broad spectrum sunscreen of at least SPF 15 on sun-exposed skin.    Preventing disease  See your dentist at least once a year  Have your eyes checked every 1-2 years.  Get a flu shot each year.

## 2019-05-22 NOTE — NURSING NOTE
Chief Complaint   Patient presents with     Physical     Pt is here for an annual physical.         TING Gamez on 5/22/2019 at 12:34 PM

## 2019-06-21 DIAGNOSIS — F41.1 GAD (GENERALIZED ANXIETY DISORDER): ICD-10-CM

## 2019-06-24 DIAGNOSIS — F41.1 GAD (GENERALIZED ANXIETY DISORDER): ICD-10-CM

## 2019-06-25 RX ORDER — ESCITALOPRAM OXALATE 10 MG/1
TABLET ORAL
Qty: 90 TABLET | Refills: 0 | Status: SHIPPED | OUTPATIENT
Start: 2019-06-25 | End: 2020-04-01

## 2019-06-27 RX ORDER — ESCITALOPRAM OXALATE 10 MG/1
TABLET ORAL
Qty: 90 TABLET | Refills: 0 | OUTPATIENT
Start: 2019-06-27

## 2019-08-19 ENCOUNTER — OFFICE VISIT (OUTPATIENT)
Dept: ORTHOPEDICS | Facility: CLINIC | Age: 59
End: 2019-08-19
Payer: COMMERCIAL

## 2019-08-19 ENCOUNTER — ANCILLARY PROCEDURE (OUTPATIENT)
Dept: GENERAL RADIOLOGY | Facility: CLINIC | Age: 59
End: 2019-08-19
Attending: FAMILY MEDICINE
Payer: COMMERCIAL

## 2019-08-19 VITALS — WEIGHT: 158 LBS | BODY MASS INDEX: 22.12 KG/M2 | HEIGHT: 71 IN

## 2019-08-19 DIAGNOSIS — M25.532 ACUTE PAIN OF LEFT WRIST: Primary | ICD-10-CM

## 2019-08-19 DIAGNOSIS — M25.532 ACUTE PAIN OF LEFT WRIST: ICD-10-CM

## 2019-08-19 ASSESSMENT — MIFFLIN-ST. JEOR: SCORE: 1392.81
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
## 2019-08-26 ENCOUNTER — OFFICE VISIT (OUTPATIENT)
Dept: ORTHOPEDICS | Facility: CLINIC | Age: 59
End: 2019-08-26
Payer: COMMERCIAL

## 2019-08-26 ENCOUNTER — ANCILLARY PROCEDURE (OUTPATIENT)
Dept: GENERAL RADIOLOGY | Facility: CLINIC | Age: 59
End: 2019-08-26
Attending: FAMILY MEDICINE
Payer: COMMERCIAL

## 2019-08-26 VITALS — HEIGHT: 71 IN | BODY MASS INDEX: 22.12 KG/M2 | WEIGHT: 158 LBS

## 2019-08-26 DIAGNOSIS — M25.532 ACUTE PAIN OF LEFT WRIST: ICD-10-CM

## 2019-08-26 DIAGNOSIS — S62.165A CLOSED NONDISPLACED FRACTURE OF PISIFORM OF LEFT WRIST, INITIAL ENCOUNTER: Primary | ICD-10-CM

## 2019-08-26 ASSESSMENT — MIFFLIN-ST. JEOR: SCORE: 1392.81

## 2019-08-26 NOTE — PROGRESS NOTES
SPORTS & ORTHOPEDIC WALK-IN FOLLOW-UP VISIT 8/26/2019    Mentions that her L wrist is still feeling painful and weak. Her wrist hurts in the morning. Notices slightly weaker  strength.  Noticed possible cyst on R forearm.     Most of her pain is noticed on the shah aspect of her carpal bones (ulnar side).    Interval History:     Follow up reason: 2 wk f/u for L wrist pain    Date of injury: 8/18/19    Date last seen: 8/19/19    Following Therapeutic Plan: Yes     Pain: Improving    Function: Improving    Medical History:    Any recent changes to your medical history? No    Any new medication prescribed since last visit? No    Review of Systems:    Do you have fever, chills, weight loss? No    Do you have any vision problems? No    Do you have any chest pain or edema? No    Do you have any shortness of breath or wheezing?  No    Do you have stomach problems? No    Do you have any numbness or focal weakness? No    Do you have diabetes? No    Do you have problems with bleeding or clotting? No    Do you have an rashes or other skin lesions? No         Cast/splint application  Date/Time: 8/26/2019 2:05 PM  Performed by: Eros Sen ATC  Authorized by: Mychal Pope MD     Consent:     Consent obtained:  Verbal    Consent given by:  Patient    Risks discussed:  Discoloration, numbness, pain and swelling  Pre-procedure details:     Sensation:  Normal  Procedure details:     Laterality:  Left    Location:  Wrist    Wrist:  L wrist    Cast type:  Short arm    Supplies:  Fiberglass  Post-procedure details:     Pain:  Improved    Sensation:  Normal    Patient tolerance of procedure:  Tolerated well, no immediate complications    Patient provided with cast or splint care instructions: Yes    Comments:      Patient was given as much MCP flexion as possible for the ability for her to type.
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
## 2019-09-04 ENCOUNTER — MYC MEDICAL ADVICE (OUTPATIENT)
Dept: ORTHOPEDICS | Facility: CLINIC | Age: 59
End: 2019-09-04

## 2019-09-05 NOTE — TELEPHONE ENCOUNTER
Discussed with patient over the phone. Pain only with direct pressure over the patella. Likely patellar contusion. No mechanical symptoms. Follow up prn.

## 2019-09-16 ENCOUNTER — OFFICE VISIT (OUTPATIENT)
Dept: ORTHOPEDICS | Facility: CLINIC | Age: 59
End: 2019-09-16
Payer: COMMERCIAL

## 2019-09-16 ENCOUNTER — ANCILLARY PROCEDURE (OUTPATIENT)
Dept: GENERAL RADIOLOGY | Facility: CLINIC | Age: 59
End: 2019-09-16
Attending: FAMILY MEDICINE
Payer: COMMERCIAL

## 2019-09-16 DIAGNOSIS — S62.165D CLOSED NONDISPLACED FRACTURE OF PISIFORM OF LEFT WRIST WITH ROUTINE HEALING, SUBSEQUENT ENCOUNTER: Primary | ICD-10-CM

## 2019-09-16 NOTE — PROGRESS NOTES
[facility]  Orthopedics  Mychal Pope MD  2019     Name: Casie Harris  MRN: [id number]  Age: 58 year old  : 1960  Referring provider: Referred Self     Chief Complaint: Follow Up of the Left Wrist    Date of Injury: 19    History of Present Illness:   Casie Harris is a 58 year old female who presents today for follow-up regarding left wrist pain. I last evaluated the patient on 19, at which time she was provided a short arm cast for added support and immobilization of the left wrist. Today, she notes that pain in her left wrist is improving. She reports stiffness after removing her cast for x-rays. She also endorses some chaffing over the ulnar side of the wrist, but overall reports no further concerns.     Review of Systems:   A 10-point review of systems was obtained and is negative except for as noted in the HPI.     Physical Examination:  Last menstrual period 2012, not currently breastfeeding.  General: alert, pleasant, no distress  Left Hand: no ecchymosis over volar and ulnar wrist. No deformity. Strength/sensation intact in median, ulnar, radial nerve distribution. Minimal TTP over the pisiform and ulnar wrist. No ttp over dorsal wrist or remaining carpal bones. Stiffness globally with wrist AROM,     Imaging:  Radiographs of the left hand - 3 views (2019)  Ongoing healing of nondisplaced pisiform fracture with decreased fracture line conspicuity.    I have independently reviewed the above imaging studies; the results were discussed with the patient.     Assessment:   58 year old female with a pisiform fracture with clinical and radiographic healing.     Plan:     I recommended that she transition from a cast to a splint to wear at all times except during showers and hand exercises.     I also recommended some gentle range of motion exercises for the next couple of weeks.     Follow-up with me in two weeks for repeat evaluation.     Mychal Pope,  MD      Scribe Disclosure:  I, Sai Christy, am serving as a scribe to document services personally performed by Mychal Pope MD at this visit, based upon the provider's statements to me. All documentation has been reviewed by the aforementioned provider prior to being entered into the official medical record.

## 2019-09-16 NOTE — PROGRESS NOTES
SPORTS & ORTHOPEDIC WALK-IN FOLLOW-UP VISIT 9/16/2019    Interval History:     Follow up reason: 4 week Recheck left wrist     Date of injury: 8/18/19    Date last seen: 8/19/19    Following Therapeutic Plan: Yes     Pain: Improving    Function: Improving    Interval History: Felt fine, a little chaffing, but overall good. After getting cast off for XR has noticed ROM is loss and feels different     Medical History:    Any recent changes to your medical history? No    Any new medication prescribed since last visit? No    Review of Systems:    Do you have fever, chills, weight loss? No    Do you have any vision problems? No    Do you have any chest pain or edema? No    Do you have any shortness of breath or wheezing?  No    Do you have stomach problems? No    Do you have any numbness or focal weakness? No    Do you have diabetes? No    Do you have problems with bleeding or clotting? No    Do you have an rashes or other skin lesions? No

## 2019-09-16 NOTE — Clinical Note
2019       RE: Casie Harris  [address] [place] [address]     Dear Colleague,    Thank you for referring your patient, Casie Harris, to the [facility] SPORTS AND ORTHOPAEDIC WALK IN CLINIC at [facility]. Please see a copy of my visit note below.          SPORTS & ORTHOPEDIC WALK-IN FOLLOW-UP VISIT 2019    Interval History:     Follow up reason: 4 week Recheck left wrist     Date of injury: 19    Date last seen: 19    Following Therapeutic Plan: Yes     Pain: Improving    Function: Improving    Interval History: Felt fine, a little chaffing, but overall good. After getting cast off for XR has noticed ROM is loss and feels different     Medical History:    Any recent changes to your medical history? No    Any new medication prescribed since last visit? No    Review of Systems:    Do you have fever, chills, weight loss? No    Do you have any vision problems? No    Do you have any chest pain or edema? No    Do you have any shortness of breath or wheezing?  No    Do you have stomach problems? No    Do you have any numbness or focal weakness? No    Do you have diabetes? No    Do you have problems with bleeding or clotting? No    Do you have an rashes or other skin lesions? No             [facility]  Orthopedics  Mychal Pope MD  2019     Name: Casie Harris  MRN: [id number]  Age: 58 year old  : 1960  Referring provider: Referred Self     Chief Complaint: Follow Up of the Left Wrist    Date of Injury: 19    History of Present Illness:   Casie Harris is a 58 year old female who presents today for follow-up regarding left wrist pain. I last evaluated the patient on 19, at which time she was provided a short arm cast for added support and immobilization of the left wrist. Today, she notes that pain in her left wrist is improving. She reports loss of range of motion and stiffness after removing her cast for  "x-rays. She also endorses some chaffing, but overall reports no further concerns.     Review of Systems:   A 10-point review of systems was obtained and is negative except for as noted in the HPI.     Physical Examination:  Last menstrual period 03/25/2012, not currently breastfeeding.  Exam:  Patient is alert, in no acute distress, pleasant and conversational    {left/right:813390}wrist/hand:  Skin intact, no erythema, rash, or ecchymosis. No skin breakdown   No soft tissue swelling or joint effusion of the wrist or hand. No atrophy of thenar or hypothenar emminances.   Full flexion and extension of the wrist and elbow without pain    Range of Motion:  Hand AROM: Full flexion and extension of the DIP, PIP and MCP joints of digits #2-5., Thumb with full flexion and extension of IP and MCP joints, opposition, abduction and adduction intact.    No subjective pain reported with range of motion testing. ROM {IS:247489} symmetric with uninjured side     Strength Testing:  Hand: Full strength with flexion, extension, abduction and adduction of the phalanges #2-5, Full strength with flexion/extension, abduction, adduction and opposition of the thumb  No subjective pain reported with strength testing    Palpation:  {POSITIVE/NEGATIVE:353283::\"negative\"} tenderness to palpation at the anatomic snuffbox  {POSITIVE/NEGATIVE:411819::\"negative\"} tenderness with compression loading of the scaphoid  {POSITIVE/NEGATIVE:278842::\"negative\"} tenderness to palpation of the other carpal bones  {POSITIVE/NEGATIVE:325653::\"negative\"} tenderness to palpation of the metacarpals  {POSITIVE/NEGATIVE:098210::\"negative\"} tenderness to palpation of the phalanges    {POSITIVE/NEGATIVE:591382::\"negative\"} tenderness to palpation of the distal radius or distal ulna  {POSITIVE/NEGATIVE:029465::\"negative\"} tenderness to palpation of the radial head    {POSITIVE/NEGATIVE:807011::\"negative\"} carpal instability on exam     +2/4 radial pulse, less than 2 " second capillary refill  Sensation is intact throughout the hand to light touch and pinprick testing    Imaging:  Radiographs of the left hand - 3 views (09/16/2019)  Ongoing healing of nondisplaced pisiform fracture with decreased fracture line conspicuity.    I have independently reviewed the above imaging studies; the results were discussed with the patient.     Assessment:   58 year old female with a pisiform fracture.    Plan:     I recommended that she transition from a cast to a splint to wear at all times except during showers and exercises.     I also recommended some gentle range of motion exercises for the next couple of weeks.     Follow-up with me in two weeks for repeat evaluation.     Scribe Disclosure:  I, Sai Christy, am serving as a scribe to document services personally performed by Mychal Pope MD at this visit, based upon the provider's statements to me. All documentation has been reviewed by the aforementioned provider prior to being entered into the official medical record.            Again, thank you for allowing me to participate in the care of your patient.      Sincerely,    Mychal Pope MD

## 2019-09-30 ENCOUNTER — OFFICE VISIT (OUTPATIENT)
Dept: ORTHOPEDICS | Facility: CLINIC | Age: 59
End: 2019-09-30
Payer: COMMERCIAL

## 2019-09-30 ENCOUNTER — TELEPHONE (OUTPATIENT)
Dept: ORTHOPEDICS | Facility: CLINIC | Age: 59
End: 2019-09-30

## 2019-09-30 DIAGNOSIS — Z53.9 ERRONEOUS ENCOUNTER--DISREGARD: Primary | ICD-10-CM

## 2019-09-30 NOTE — TELEPHONE ENCOUNTER
Called Casie Anne to apologize for the wait she endured for her 2:00PM appt.   Left her a VM with our callback number in case she has any further questions or would like to make another appointment.     - Eros STRONG ATC
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
## 2019-10-05 ENCOUNTER — HEALTH MAINTENANCE LETTER (OUTPATIENT)
Age: 59
End: 2019-10-05

## 2019-10-07 DIAGNOSIS — S62.165D CLOSED NONDISPLACED FRACTURE OF PISIFORM OF LEFT WRIST WITH ROUTINE HEALING, SUBSEQUENT ENCOUNTER: Primary | ICD-10-CM

## 2019-10-09 ENCOUNTER — THERAPY VISIT (OUTPATIENT)
Dept: OCCUPATIONAL THERAPY | Facility: CLINIC | Age: 59
End: 2019-10-09
Payer: COMMERCIAL

## 2019-10-09 DIAGNOSIS — S62.165D CLOSED NONDISPLACED FRACTURE OF PISIFORM OF LEFT WRIST WITH ROUTINE HEALING, SUBSEQUENT ENCOUNTER: ICD-10-CM

## 2019-10-09 DIAGNOSIS — S62.162A: Primary | ICD-10-CM

## 2019-10-09 PROCEDURE — 97110 THERAPEUTIC EXERCISES: CPT | Mod: GO | Performed by: OCCUPATIONAL THERAPIST

## 2019-10-09 PROCEDURE — 97140 MANUAL THERAPY 1/> REGIONS: CPT | Mod: GO | Performed by: OCCUPATIONAL THERAPIST

## 2019-10-09 PROCEDURE — 97165 OT EVAL LOW COMPLEX 30 MIN: CPT | Mod: GO | Performed by: OCCUPATIONAL THERAPIST

## 2019-10-09 PROCEDURE — 97035 APP MDLTY 1+ULTRASOUND EA 15: CPT | Mod: GO | Performed by: OCCUPATIONAL THERAPIST
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
## 2019-10-11 NOTE — PROGRESS NOTES
SOAP Note Objective Information for 10/16/2019:    Pain Level (Scale 0-10)   10/9/2019 10/16/19   At Rest 2 2   With Use 2 3     Pain Description  Date 10/9/2019   Location hand   Pain Quality stiff   Frequency intermittent     Pain is worst  daytime, morning   Exacerbated by  use   Relieved by cold   Progression worsening     Home Exercise Program:  Massage pisiform and ulnar wrist  Wrist ROM  Tendon gliding fist series  K-tape  Icing  Brace HS and heavy tasks    Next Visit:  Consider smaller pisiform protection splint  US or Laser  STM  K-tape  ROM  Progress to gentle strengthening/stability when pain reduces    Please refer to the daily flowsheet for treatment today, total treatment time and time spent performing 1:1 timed codes.

## 2019-10-16 ENCOUNTER — THERAPY VISIT (OUTPATIENT)
Dept: OCCUPATIONAL THERAPY | Facility: CLINIC | Age: 59
End: 2019-10-16
Attending: FAMILY MEDICINE
Payer: COMMERCIAL

## 2019-10-16 DIAGNOSIS — M79.645 PAIN OF FINGER OF LEFT HAND: Primary | ICD-10-CM

## 2019-10-16 DIAGNOSIS — M79.642 PAIN OF LEFT HAND: ICD-10-CM

## 2019-10-16 PROCEDURE — 97110 THERAPEUTIC EXERCISES: CPT | Mod: GO | Performed by: OCCUPATIONAL THERAPIST

## 2019-10-16 PROCEDURE — 97035 APP MDLTY 1+ULTRASOUND EA 15: CPT | Mod: GO | Performed by: OCCUPATIONAL THERAPIST

## 2019-10-16 PROCEDURE — 97140 MANUAL THERAPY 1/> REGIONS: CPT | Mod: GO | Performed by: OCCUPATIONAL THERAPIST

## 2019-10-25 ENCOUNTER — THERAPY VISIT (OUTPATIENT)
Dept: OCCUPATIONAL THERAPY | Facility: CLINIC | Age: 59
End: 2019-10-25
Payer: COMMERCIAL

## 2019-10-25 DIAGNOSIS — M79.642 PAIN OF LEFT HAND: ICD-10-CM

## 2019-10-25 PROCEDURE — 97035 APP MDLTY 1+ULTRASOUND EA 15: CPT | Mod: GO | Performed by: OCCUPATIONAL THERAPIST

## 2019-10-25 PROCEDURE — 97110 THERAPEUTIC EXERCISES: CPT | Mod: GO | Performed by: OCCUPATIONAL THERAPIST

## 2019-10-25 PROCEDURE — 97140 MANUAL THERAPY 1/> REGIONS: CPT | Mod: GO | Performed by: OCCUPATIONAL THERAPIST

## 2019-10-29 NOTE — PROGRESS NOTES
SOAP Note Objective Information for 11/1/2019:    Pain Level (Scale 0-10)   10/9/2019 10/16/19 10/25/19 11/1/19   At Rest 2 2 0-1 0-1   With Use 2 3 1-2 1-2     Pain Description  Date 10/9/2019 10/25/19   Location hand Left ulnar hand   Pain Quality stiff Stiff and sluggish   Frequency intermittent   intermittent   Pain is worst  daytime, morning Morning, day   Exacerbated by  use UD, gripping   Relieved by cold Taping, self massage   Progression worsening improving       Wrist ROM 10/9/19  10/25/19 11/1/19     Non-  injured AROM  (PROM) Right Left Left Left      Ext 76 70 pre  70 post 70 pre  72 post NT      Flex 70 55 pre  55 post 42 pre  55 post   60 post      RD 15 15 15 pre (WNL) NT      UD 30 24 pre  24 post 25 pre NT      Sup WNL WNL NT       Pron   NT        Strength   (Measured in pounds)  Pain Report:  + mild    ++ moderate    +++ severe    11/1/2019 11/1/2019   Trials Right Left   1  2  3 64 34+ over pisiform   Average 64 34     Home Exercise Program:  Massage pisiform and ulnar wrist  Wrist ROM  Tendon gliding fist series  K-tape  Icing  Brace HS and heavy tasks  Door frame pull backs  Isometric wrist flexion/extension    Next Visit:  PN due  US   STM  K-tape  ROM  Check response to gentle strengthening/stability (door frame pull backs, isometrics) - progress to strengthening as indicated, PROM wrist    Please refer to the daily flowsheet for treatment today, total treatment time and time spent performing 1:1 timed codes.

## 2019-11-01 ENCOUNTER — THERAPY VISIT (OUTPATIENT)
Dept: OCCUPATIONAL THERAPY | Facility: CLINIC | Age: 59
End: 2019-11-01
Payer: COMMERCIAL

## 2019-11-01 DIAGNOSIS — M79.642 PAIN OF LEFT HAND: ICD-10-CM

## 2019-11-01 PROCEDURE — 97140 MANUAL THERAPY 1/> REGIONS: CPT | Mod: GO | Performed by: OCCUPATIONAL THERAPIST

## 2019-11-01 PROCEDURE — 97110 THERAPEUTIC EXERCISES: CPT | Mod: GO | Performed by: OCCUPATIONAL THERAPIST

## 2019-11-01 PROCEDURE — 97035 APP MDLTY 1+ULTRASOUND EA 15: CPT | Mod: GO | Performed by: OCCUPATIONAL THERAPIST

## 2020-01-07 PROBLEM — M79.642 PAIN OF LEFT HAND: Status: RESOLVED | Noted: 2019-10-16 | Resolved: 2020-01-07

## 2020-02-10 ENCOUNTER — HEALTH MAINTENANCE LETTER (OUTPATIENT)
Age: 60
End: 2020-02-10

## 2020-02-19 DIAGNOSIS — H91.90 HEARING LOSS, UNSPECIFIED HEARING LOSS TYPE, UNSPECIFIED LATERALITY: Primary | ICD-10-CM

## 2020-04-01 ENCOUNTER — VIRTUAL VISIT (OUTPATIENT)
Dept: FAMILY MEDICINE | Facility: CLINIC | Age: 60
End: 2020-04-01
Payer: COMMERCIAL

## 2020-04-01 DIAGNOSIS — F41.1 GAD (GENERALIZED ANXIETY DISORDER): ICD-10-CM

## 2020-04-01 DIAGNOSIS — Z92.29 HISTORY OF HORMONE THERAPY: Primary | ICD-10-CM

## 2020-04-01 RX ORDER — ESCITALOPRAM OXALATE 5 MG/1
5 TABLET ORAL DAILY
Qty: 90 TABLET | Refills: 3 | Status: SHIPPED | OUTPATIENT
Start: 2020-04-01 | End: 2021-03-22

## 2020-04-01 ASSESSMENT — PAIN SCALES - GENERAL: PAINLEVEL: NO PAIN (0)

## 2020-04-01 NOTE — PROGRESS NOTES
"Subjective     Casie Harris is a 59 year old female who is being evaluated via a billable telephone visit.      The patient has been notified of following:     \"This telephone visit will be conducted via a call between you and your physician/provider. We have found that certain health care needs can be provided without the need for a physical exam.  This service lets us provide the care you need with a short phone conversation.  If a prescription is necessary we can send it directly to your pharmacy.  If lab work is needed we can place an order for that and you can then stop by our lab to have the test done at a later time.    If during the course of the call the physician/provider feels a telephone visit is not appropriate, you will not be charged for this service.\"     Patient has given verbal consent for Telephone visit?  Yes    Casie Harris complains of   Chief Complaint   Patient presents with     Recheck Medication     pt would like a med refill on lexapro     HPI This is a 59-year-old female who has a history of anxiety.  About 2 years ago she was started on Lexapro.  It helped.  She has decreased the dose  to 5 mg and has done well since last June on this maintenance dose.  She  wants a refill.    She also stopped hormone therapy about a year ago.  She does report some intermittent hot flashes.  Sometimes her breasts feel bloated.  She  had surgery with Dr. Vela at [facility] September 2018 to remove an ovarian cyst, her right ovary and  a large fibroid.  She was on hormone  therapy but stopped it in March 2019.  She  was given Vagifem but currently is not using that.      She has no other issues.  Her physical is due in May.  ALLERGIES  Nkda [no known drug allergies]  Current Outpatient Medications   Medication     Cholecalciferol (VITAMIN D) 2000 UNIT CAPS     escitalopram (LEXAPRO) 5 MG tablet     Probiotic Product (PROBIOTIC DAILY PO)     No current facility-administered medications for " this visit.        PMHx Reviewed and updated as needed this visit by Provider         Review of Systems   ROS COMP: Constitutional, HEENT, cardiovascular, pulmonary, gi and gu systems are negative, except as otherwise noted.       Objective   Reported vitals:  LMP 03/25/2012    healthy, alert and no distress  Psych: Alert and oriented times 3; coherent speech, normal   rate and volume, able to articulate logical thoughts, able   to abstract reason, no tangential thoughts, no hallucinations   or delusions  Her affect is good      Labs reviewed in Epic  none         Assessment/Plan:  This is a healthy 59-year-old who has been maintained on low-dose of Lexapro for anxiety and is doing well and needs a refill.    1. BECCA (generalized anxiety disorder)  She has some decreased her Lexapro to maintenance dose and is doing well no episodes of panic attacks.  Will refill at 5 mg daily  - escitalopram (LEXAPRO) 5 MG tablet; Take 1 tablet (5 mg) by mouth daily  Dispense: 90 tablet; Refill: 3    2. History of hormone therapy  Unclear why she would still be having  hot flashes intermittently.  She has now been off of hormone therapy about a year.  She is not interested in resuming it at this time      Plan  Refill lexapro 5mg/day  F/u in summer for a physical       Phone call duration:  10 minutes    Sherice Jamil MD PhD

## 2020-04-01 NOTE — NURSING NOTE
Chief Complaint   Patient presents with     Recheck Medication     pt would like a med refill on sammie Villa CMA, EMT at 12:15 PM on 4/1/2020.

## 2020-04-02 ENCOUNTER — TELEPHONE (OUTPATIENT)
Dept: FAMILY MEDICINE | Facility: CLINIC | Age: 60
End: 2020-04-02

## 2020-04-02 DIAGNOSIS — Z12.39 SCREENING BREAST EXAMINATION: Primary | ICD-10-CM

## 2020-04-02 NOTE — TELEPHONE ENCOUNTER
Called and spoke to patient to help set up appointment for F/u in summer for a physical. Patient schedule for Wednesday, July 8 at 1:45 PM. Patient confirmed appointment. Patient is also requesting for mammogram order, will send to provider and RN.

## 2020-07-08 ENCOUNTER — TRANSFERRED RECORDS (OUTPATIENT)
Dept: HEALTH INFORMATION MANAGEMENT | Facility: CLINIC | Age: 60
End: 2020-07-08

## 2020-08-26 ENCOUNTER — OFFICE VISIT (OUTPATIENT)
Dept: FAMILY MEDICINE | Facility: CLINIC | Age: 60
End: 2020-08-26
Payer: COMMERCIAL

## 2020-08-26 ENCOUNTER — ANCILLARY PROCEDURE (OUTPATIENT)
Dept: MAMMOGRAPHY | Facility: CLINIC | Age: 60
End: 2020-08-26
Attending: FAMILY MEDICINE
Payer: COMMERCIAL

## 2020-08-26 VITALS
HEART RATE: 75 BPM | DIASTOLIC BLOOD PRESSURE: 74 MMHG | BODY MASS INDEX: 22.87 KG/M2 | SYSTOLIC BLOOD PRESSURE: 111 MMHG | OXYGEN SATURATION: 99 % | WEIGHT: 164 LBS

## 2020-08-26 DIAGNOSIS — E78.00 ELEVATED CHOLESTEROL: ICD-10-CM

## 2020-08-26 DIAGNOSIS — Z86.0100 HISTORY OF COLONIC POLYPS: ICD-10-CM

## 2020-08-26 DIAGNOSIS — Z00.00 ANNUAL PHYSICAL EXAM: ICD-10-CM

## 2020-08-26 DIAGNOSIS — R20.2 BILATERAL NUMBNESS AND TINGLING OF ARMS AND LEGS: ICD-10-CM

## 2020-08-26 DIAGNOSIS — R20.0 BILATERAL NUMBNESS AND TINGLING OF ARMS AND LEGS: ICD-10-CM

## 2020-08-26 DIAGNOSIS — M54.50 CHRONIC RIGHT-SIDED LOW BACK PAIN WITHOUT SCIATICA: Primary | ICD-10-CM

## 2020-08-26 DIAGNOSIS — G89.29 CHRONIC RIGHT-SIDED LOW BACK PAIN WITHOUT SCIATICA: Primary | ICD-10-CM

## 2020-08-26 DIAGNOSIS — Z12.39 SCREENING BREAST EXAMINATION: ICD-10-CM

## 2020-08-26 RX ORDER — LORATADINE 10 MG/1
10 TABLET ORAL
COMMUNITY
Start: 2020-07-01 | End: 2021-09-29

## 2020-08-26 SDOH — HEALTH STABILITY: MENTAL HEALTH
STRESS IS WHEN SOMEONE FEELS TENSE, NERVOUS, ANXIOUS, OR CAN'T SLEEP AT NIGHT BECAUSE THEIR MIND IS TROUBLED. HOW STRESSED ARE YOU?: ONLY A LITTLE

## 2020-08-26 SDOH — SOCIAL STABILITY: SOCIAL INSECURITY
WITHIN THE LAST YEAR, HAVE YOU BEEN KICKED, HIT, SLAPPED, OR OTHERWISE PHYSICALLY HURT BY YOUR PARTNER OR EX-PARTNER?: NO

## 2020-08-26 SDOH — SOCIAL STABILITY: SOCIAL INSECURITY: WITHIN THE LAST YEAR, HAVE YOU BEEN HUMILIATED OR EMOTIONALLY ABUSED IN OTHER WAYS BY YOUR PARTNER OR EX-PARTNER?: NO

## 2020-08-26 SDOH — SOCIAL STABILITY: SOCIAL INSECURITY: WITHIN THE LAST YEAR, HAVE YOU BEEN AFRAID OF YOUR PARTNER OR EX-PARTNER?: NO

## 2020-08-26 SDOH — SOCIAL STABILITY: SOCIAL INSECURITY
WITHIN THE LAST YEAR, HAVE TO BEEN RAPED OR FORCED TO HAVE ANY KIND OF SEXUAL ACTIVITY BY YOUR PARTNER OR EX-PARTNER?: NO

## 2020-08-26 ASSESSMENT — ENCOUNTER SYMPTOMS
HYPOTENSION: 0
NIGHT SWEATS: 0
WEIGHT LOSS: 0
DECREASED APPETITE: 0
BREAST MASS: 0
LIGHT-HEADEDNESS: 0
POLYDIPSIA: 0
MYALGIAS: 1
EXERCISE INTOLERANCE: 0
HOT FLASHES: 1
HALLUCINATIONS: 0
BACK PAIN: 1
MUSCLE CRAMPS: 0
SLEEP DISTURBANCES DUE TO BREATHING: 0
PALPITATIONS: 0
FATIGUE: 0
DECREASED LIBIDO: 0
STIFFNESS: 1
WEIGHT GAIN: 1
FEVER: 0
JOINT SWELLING: 0
NAIL CHANGES: 0
NECK PAIN: 0
LEG PAIN: 1
POLYPHAGIA: 0
SYNCOPE: 0
BREAST PAIN: 0
MUSCLE WEAKNESS: 0
ORTHOPNEA: 0
SKIN CHANGES: 0
POOR WOUND HEALING: 1
ARTHRALGIAS: 1
HYPERTENSION: 0
INCREASED ENERGY: 0
ALTERED TEMPERATURE REGULATION: 1
CHILLS: 0

## 2020-08-26 ASSESSMENT — PATIENT HEALTH QUESTIONNAIRE - PHQ9
5. POOR APPETITE OR OVEREATING: MORE THAN HALF THE DAYS
SUM OF ALL RESPONSES TO PHQ QUESTIONS 1-9: 1

## 2020-08-26 ASSESSMENT — ANXIETY QUESTIONNAIRES
1. FEELING NERVOUS, ANXIOUS, OR ON EDGE: NOT AT ALL
2. NOT BEING ABLE TO STOP OR CONTROL WORRYING: NOT AT ALL
7. FEELING AFRAID AS IF SOMETHING AWFUL MIGHT HAPPEN: NOT AT ALL
3. WORRYING TOO MUCH ABOUT DIFFERENT THINGS: NOT AT ALL
5. BEING SO RESTLESS THAT IT IS HARD TO SIT STILL: SEVERAL DAYS
IF YOU CHECKED OFF ANY PROBLEMS ON THIS QUESTIONNAIRE, HOW DIFFICULT HAVE THESE PROBLEMS MADE IT FOR YOU TO DO YOUR WORK, TAKE CARE OF THINGS AT HOME, OR GET ALONG WITH OTHER PEOPLE: NOT DIFFICULT AT ALL
6. BECOMING EASILY ANNOYED OR IRRITABLE: NOT AT ALL
GAD7 TOTAL SCORE: 3

## 2020-08-26 ASSESSMENT — PAIN SCALES - GENERAL: PAINLEVEL: NO PAIN (0)

## 2020-08-26 NOTE — PATIENT INSTRUCTIONS
shingrix - get at pharmacy or call and see if can get in clinic  Blood work when fasting  Mammogram today  Physical therapy for your back    Nutritious diet  Eat 1200 mg calcium total every day.  Many people achieve this by a diet containing calcium (milk, yogurt, cheese, and other dairy products, supplemented food) and 1 calcium pill. If you don't eat dairy, you should take a calcium supplement 2 times a day.  2000 IU of vitamin D on daily basis.    Eat a variety of fruits and vegetables and eat whole grains.  Try to choose foods with a high NuVal score, if your grocery store shows this number. High numbers, like 80 or 90, are nutritious foods, and low scores, like 10 are less nutritious foods.          Men should drink no more than 2 alcoholic beverages daily on average; women should drink no more than 1 alcoholic beverage daily on average.    Everyone should avoid all tobacco and nicotine-containing products.    Exercise: Aim for:  Moderate activity (where you can still talk while doing it, such as walking about 100 steps per minute, or 3,000 steps in 30 minutes) for 30 minutes at least 5 days a week  Or  Vigorous exercise (you are working so hard you are breathing hard and fast and your heart rate has gone up, such as playing basketball or soccer) at least 75 minutes weekly    If you have trouble doing 30 minutes of activity, all at once, try small bursts of activity. Go to www.Sayah.Web and Rank for suggestions on how you can do this at home or work.     All adults should try to do muscle strengthening exercise at least 2 days a week    Safety  Always wear bike/motorcycle helmet and seatbelt in a vehicle  Make sure your home has smoke and carbon monoxide detectors.  Wear broad spectrum sunscreen of at least SPF 15 on sun-exposed skin.    Preventing disease  See your dentist at least once a year  Have your eyes checked every 1-2 years.  Get a flu shot each year.

## 2020-08-26 NOTE — PROGRESS NOTES
REASON for VISIT physical    HPI   Casie Harris is a 59 year old female who is here for a preventive examination. Gr0P0  Postmenopausal no vaginal bleeding, no discharge.  Last pap was 2018 by our records and pt report in 2019 - sees Dr Vela at [facility]. 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    Mammogram - today   Calcium: kelby, green vegetables;  Calcium  1 pill rarely  - takes magnesium powder at night  Vit D - 1000IU/day  Has not had a DXA     She describes tingling in her abdomen (right side)  and lower back.  This is not new.  Her surgery in 2018 with Dr. Vela removed a right  ovarian cyst and her fallopian tubes and  a large fibroid which may have caused some scar tissue.  Dr. Vela has seen her for this and started her on gabapentin in March 2019, however she is no longer on this.  She would prefer to take minimal medications.  She saw Dr. Vela last time in December 2019.  She has been to pelvic floor people and did PT in summer 2018.  She has ongoing pelvic pain and had a TVU 7/2020 which reported fibroids and normal left ovary.    She also describes right sided pain which goes into right leg periodically and into groin and is burning. This is since June, and she does recall gardening and pulling a weed and feeling strain in her back. She would like to try PT for this     She describes numbness and tingling intermittently  In back, whole body, mouth, hands and feet - has had extensive neurological w/u in past for this  - has spurts of it  - no definitive etiology         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     Autoimmune disease (H) 1987    Chronic Fatigue Syndrome     Colon polyps 2014     Sensorineural hearing loss 2012, 2017     Tinnitus April 2017         Current Outpatient Medications   Medication Sig Dispense Refill     Cholecalciferol (VITAMIN D) 2000 UNIT CAPS Take 2 daily       escitalopram  (LEXAPRO) 5 MG tablet Take 1 tablet (5 mg) by mouth daily 90 tablet 3     loratadine (CLARITIN) 10 MG tablet Take 10 mg by mouth       Probiotic Product (PROBIOTIC DAILY PO) Take by mouth as needed           Allergies   Allergen Reactions     Nkda [No Known Drug Allergies]          Social History     Tobacco Use     Smoking status: Never Smoker     Smokeless tobacco: Never Used     Tobacco comment: never smoked   Substance Use Topics     Alcohol use: Yes     Alcohol/week: 0.0 standard drinks     Comment: rare     Drug use: No     Social History     Socioeconomic History     Marital status:      Spouse name: Not on file     Number of children: Not on file     Years of education: Not on file     Highest education level: Not on file   Occupational History     Occupation:     Social Needs     Financial resource strain: Not on file     Food insecurity     Worry: Not on file     Inability: Not on file     Transportation needs     Medical: Not on file     Non-medical: Not on file   Tobacco Use     Smoking status: Never Smoker     Smokeless tobacco: Never Used     Tobacco comment: never smoked   Substance and Sexual Activity     Alcohol use: Yes     Alcohol/week: 0.0 standard drinks     Comment: wine - 4 glasses/wk     Drug use: No     Sexual activity: Yes     Partners: Male     Birth control/protection: Post-menopausal   Lifestyle     Physical activity     Days per week: 7 days     Minutes per session: 30 min     Stress: Only a little   Relationships     Social connections     Talks on phone: Not on file     Gets together: Not on file     Attends Anabaptism service: Not on file     Active member of club or organization: Not on file     Attends meetings of clubs or organizations: Not on file     Relationship status: Not on file     Intimate partner violence     Fear of current or ex partner: No     Emotionally abused: No     Physically abused: No     Forced sexual activity: No   Other Topics Concern      Parent/sibling w/ CABG, MI or angioplasty before 65F 55M? Not Asked      Service Not Asked     Blood Transfusions Not Asked     Caffeine Concern Yes     Comment: 2 c/day     Occupational Exposure Not Asked     Hobby Hazards Not Asked     Sleep Concern Yes     Stress Concern Yes     Comment: sister committed suicide     Weight Concern Yes     Special Diet No     Back Care Not Asked     Exercise Yes     Comment: walk q day,  - 3x/wk, bike     Bike Helmet Yes     Comment: most times     Seat Belt Yes     Self-Exams Not Asked   Social History Narrative    ,  for 10 years, no children. Family in area.              Family History   Problem Relation Age of Onset     Prostate Cancer Father      Heart Disease Father      Cardiovascular Father         AF CHF     Cancer Father         BCC     Neurologic Disorder Father      Cerebrovascular Disease Sister      Lipids Sister      Heart Disease Maternal Grandfather      Heart Disease Paternal Grandmother      Cerebrovascular Disease Mother      Hypertension Sister      Breast Cancer Paternal Aunt      Suicide Sister      Depression Sister      Mental Illness Sister      Breast Cancer Other      Most Recent Immunizations   Administered Date(s) Administered     Hep B, Peds or Adolescent 03/20/2017     HepB 03/20/2017     HepB, Unspecified 03/20/2017     HepB-Adult 03/20/2017     TD (ADULT, 7+) 07/28/2005     TDAP Vaccine (Boostrix) 03/20/2017     Tdap (Adacel,Boostrix) 05/08/2007     Tdap (Adult) Unspecified Formulation 07/28/2005   Deferred Date(s) Deferred     Zoster vaccine recombinant adjuvanted (SHINGRIX) 08/26/2020            Review Of Systems  Answers for HPI/ROS submitted by the patient on 8/26/2020   General Symptoms: Yes  Skin Symptoms: Yes  HENT Symptoms: No  EYE SYMPTOMS: No  HEART SYMPTOMS: Yes  LUNG SYMPTOMS: No  INTESTINAL SYMPTOMS: No  URINARY SYMPTOMS: No  GYNECOLOGIC SYMPTOMS: Yes  BREAST SYMPTOMS: Yes  SKELETAL SYMPTOMS: Yes  BLOOD  SYMPTOMS: No  NERVOUS SYSTEM SYMPTOMS: No  MENTAL HEALTH SYMPTOMS: No  Fever: No  Loss of appetite: No  Weight loss: No  Weight gain: Yes  Fatigue: No  Night sweats: No  Chills: No  Increased stress: No  Excessive hunger: No  Excessive thirst: No  Feeling hot or cold when others believe the temperature is normal: Yes  Loss of height: No  Post-operative complications: Yes  Surgical site pain: Yes  Hallucinations: No  Change in or Loss of Energy: No  Hyperactivity: No  Confusion: No  Changes in hair: No  Changes in moles/birth marks: No  Itching: Yes  Rashes: Yes  Changes in nails: No  Acne: No  Hair in places you don't want it: No  Change in facial hair: No  Warts: No  Non-healing sores: Yes  Scarring: No  Flaking of skin: No  Color changes of hands/feet in cold : No  Sun sensitivity: No  Skin thickening: No  Chest pain or pressure: No  Fast or irregular heartbeat: No  Pain in legs with walking: Yes  Trouble breathing while lying down: No  Fingers or toes appear blue: No  High blood pressure: No  Low blood pressure: No  Fainting: No  Murmurs: No  Pacemaker: No  Varicose veins: Yes  Edema or swelling: No  Wake up at night with shortness of breath: No  Light-headedness: No  Exercise intolerance: No  Back pain: Yes  Muscle aches: Yes  Neck pain: No  Swollen joints: No  Joint pain: Yes  Bone pain: No  Muscle cramps: No  Muscle weakness: No  Joint stiffness: Yes  Bone fracture: No  Bleeding or spotting between periods: No  Heavy or painful periods: No  Irregular periods: No  Vaginal discharge: No  Hot flashes: Yes  Vaginal dryness: Yes  Genital ulcers: No  Reduced libido: No  Painful intercourse: No  Difficulty with sexual arousal: No  Post-menopausal bleeding: No  Discharge: No  Lumps: No  Pain: No  Nipple retraction: No      OBJECTIVE:  /74   Pulse 75   Wt 74.4 kg (164 lb)   LMP 03/25/2012   SpO2 99%   BMI 22.87 kg/m    Gen:  Middle age woman in NAD  HEENT: PERRL + red reflex   Ears clear with good light  reflex.  OP mask  Neck  Supple.  Thyroid not palpable  No carotid bruits.  No LAD  CVS exam: S1, S2 normal, no murmur, click, rub or gallop, regular rate and rhythm, chest is clear without rales or wheezing, no pedal edema, no JVD, no hepatosplenomegaly.  Abd Soft ND NT  BS active  No masses or HSM  Ext   Good pulses. No edema  Musculoskeletal: She has some mild point tenderness on the right SI joint.  She can fully flex her right knee left hip and has excellent internal and external rotation bilaterally.  She has reflexes 1/4 in the knees equal and equal bilaterally.  Motor strength of the lower extremities is 5/5 and equal bilaterally.    BREAST:  normal without suspicious masses, skin changes or axillary nodes, symmetric fibrous changes in both upper outer quadrants   Pelvic exam:deferred - done by gyn 12/2019  Rectal exam: deferred  Neuro: Neurological exam reveals normal without focal findings, mental status, speech normal, alert and oriented x iii, RENETTA, cranial nerves 2-12 intact, muscle tone and strength normal 5/5 and symmetric, reflexes 1/4 in UE and LE  and symmetric.      ASSESSMENT: This is a 59-year-old postmenopausal woman who comes in today for a annual exam and has some concerns about intermittent numbness and tingling in the abdomen on the right side and some discomfort in the low right back that radiates into the groin and leg.    (M54.5,  G89.29) Chronic right-sided low back pain without sciatica  (primary encounter diagnosis)  Comment: unclear etiology - this could be from the SI joint will try PT for this   Plan: PHYSICAL THERAPY REFERRAL            (Z00.00) Annual physical exam  Comment: her gyn does her pelvics (12/2019) she needs her second shingles shot, other labs were done 1/2019 and normal, TSH, comprehensive, vit D  Pap not indicated, mammogram scheduled today - DXA would be due in a few years - discussed calcium and vit D.   Plan: ZOSTER VACCINE RECOMBINANT ADJUVANTED IM NJX             (Z86.010) History of colonic polyps  Comment: last colonoscopy was 2014 and told repeat in 5 yrs - she is overdue  Plan: GASTROENTEROLOGY ADULT REF PROCEDURE ONLY        ordered    (R20.0,  R20.2) Bilateral numbness and tingling of arms and legs  Comment: unclear etiology - has seen neurology and had extensive w/u  - will check CBC for possible anemia and inflammation - in the past she has not wanted neurontin - the abdominal tingling on the right might be scar tissue causing this sensation.  She has been to the pelvic floor clinic and also did do a trial of neurontin.    Plan: CBC with platelets differential, CRP         inflammation            (E78.00) Elevated cholesterol  Comment: mild elevation - 1/2019 LDL was 124  Plan: Lipid Profile FASTING        Repeat     Sherice Jamil MD, PhD

## 2020-08-26 NOTE — NURSING NOTE
"Chief Complaint   Patient presents with     Physical     pt would like to discuss \"boil\" on right leg. Noticed it 3 weeks ago, got bigger, then smaller     Kimberly Nissen, EMT at 12:53 PM on 8/26/2020    "

## 2020-08-27 ASSESSMENT — ANXIETY QUESTIONNAIRES: GAD7 TOTAL SCORE: 3

## 2020-09-03 DIAGNOSIS — R20.0 BILATERAL NUMBNESS AND TINGLING OF ARMS AND LEGS: ICD-10-CM

## 2020-09-03 DIAGNOSIS — E78.00 ELEVATED CHOLESTEROL: ICD-10-CM

## 2020-09-03 DIAGNOSIS — R20.2 BILATERAL NUMBNESS AND TINGLING OF ARMS AND LEGS: ICD-10-CM

## 2020-09-03 LAB
BASOPHILS # BLD AUTO: 0 10E9/L (ref 0–0.2)
BASOPHILS NFR BLD AUTO: 0.6 %
CHOLEST SERPL-MCNC: 234 MG/DL
CRP SERPL-MCNC: <2.9 MG/L (ref 0–8)
DIFFERENTIAL METHOD BLD: NORMAL
EOSINOPHIL # BLD AUTO: 0.1 10E9/L (ref 0–0.7)
EOSINOPHIL NFR BLD AUTO: 2.5 %
ERYTHROCYTE [DISTWIDTH] IN BLOOD BY AUTOMATED COUNT: 13.2 % (ref 10–15)
HCT VFR BLD AUTO: 42.1 % (ref 35–47)
HDLC SERPL-MCNC: 95 MG/DL
HGB BLD-MCNC: 13.7 G/DL (ref 11.7–15.7)
LDLC SERPL CALC-MCNC: 121 MG/DL
LYMPHOCYTES # BLD AUTO: 2.4 10E9/L (ref 0.8–5.3)
LYMPHOCYTES NFR BLD AUTO: 46.6 %
MCH RBC QN AUTO: 30.7 PG (ref 26.5–33)
MCHC RBC AUTO-ENTMCNC: 32.5 G/DL (ref 31.5–36.5)
MCV RBC AUTO: 94 FL (ref 78–100)
MONOCYTES # BLD AUTO: 0.3 10E9/L (ref 0–1.3)
MONOCYTES NFR BLD AUTO: 5.8 %
NEUTROPHILS # BLD AUTO: 2.3 10E9/L (ref 1.6–8.3)
NEUTROPHILS NFR BLD AUTO: 44.5 %
NONHDLC SERPL-MCNC: 139 MG/DL
PLATELET # BLD AUTO: 230 10E9/L (ref 150–450)
RBC # BLD AUTO: 4.46 10E12/L (ref 3.8–5.2)
TRIGL SERPL-MCNC: 90 MG/DL
WBC # BLD AUTO: 5.2 10E9/L (ref 4–11)

## 2020-09-03 PROCEDURE — 80061 LIPID PANEL: CPT | Performed by: FAMILY MEDICINE

## 2020-09-03 PROCEDURE — 36415 COLL VENOUS BLD VENIPUNCTURE: CPT | Performed by: FAMILY MEDICINE

## 2020-09-03 PROCEDURE — 85025 COMPLETE CBC W/AUTO DIFF WBC: CPT | Performed by: FAMILY MEDICINE

## 2020-09-03 PROCEDURE — 86140 C-REACTIVE PROTEIN: CPT | Performed by: FAMILY MEDICINE

## 2020-09-18 ENCOUNTER — THERAPY VISIT (OUTPATIENT)
Dept: PHYSICAL THERAPY | Facility: CLINIC | Age: 60
End: 2020-09-18
Attending: FAMILY MEDICINE
Payer: COMMERCIAL

## 2020-09-18 DIAGNOSIS — G89.29 CHRONIC RIGHT-SIDED LOW BACK PAIN WITHOUT SCIATICA: ICD-10-CM

## 2020-09-18 DIAGNOSIS — G89.29 CHRONIC BILATERAL LOW BACK PAIN WITHOUT SCIATICA: Primary | ICD-10-CM

## 2020-09-18 DIAGNOSIS — M54.50 CHRONIC RIGHT-SIDED LOW BACK PAIN WITHOUT SCIATICA: ICD-10-CM

## 2020-09-18 DIAGNOSIS — M54.50 CHRONIC BILATERAL LOW BACK PAIN WITHOUT SCIATICA: Primary | ICD-10-CM

## 2020-09-18 PROCEDURE — 97110 THERAPEUTIC EXERCISES: CPT | Mod: 95 | Performed by: PHYSICAL THERAPIST

## 2020-09-18 PROCEDURE — 97161 PT EVAL LOW COMPLEX 20 MIN: CPT | Mod: 95 | Performed by: PHYSICAL THERAPIST

## 2020-09-18 NOTE — PROGRESS NOTES
[facility] Initial Evaluation  Subjective:  The history is provided by the patient.   C/C: Patient is a 59-year-old woman with complaints of relatively constant deep ache pain in the right low back.  Pain will extend into right groin and right buttock at times.  Intensity is 4-6/10.  Worse with prolonged standing and walking greater than 45 minutes.  Is relieved with heat and with massage/pressure over area.  Has noted some improvement since onset.  Has a previous history of noting tingling that is not changed with this current episode of pain.  Patient notes no change in bowel and bladder.  Sleep can be disturbed.  Hx:  7/2020-patient injured right low back when pulling while gardening.  Noted pain in right low back and did note some radiation down right leg.  This has improved but still noting pain in his right buttock and groin at times.  Has not had any imaging.  PMH:  11/17-Injured back trimming bushes in yard.  This has improved with previous PT. Has long hx of intermittent back pain that was shorted lived and easily managed. Surgery for cyst and fibroid removal-right ovary removed and fallopian tubes.  Hx of anxiety.  Pt likes to walk, cycle, ski.    General health:  Good.  Works as a writer.                      Objective:  System         Lumbar/SI Evaluation  ROM:    AROM Lumbar:   Flexion:          Fingers to tops of shoes-feels stretch  Ext:                    WNL-Feels good   Side Bend:        Left:  WNL    Right:  Slight limit with R sided pain  Rotation:           Left:     Right:   Side Glide:        Left:     Right:           Lumbar Myotomes:  Lumbar myotomes: Able to heel/toe walk without difficulty.      L4 (Ankle DF):  Left:  5    Right:  5    S1 (Toe Raise):  Left: 5    Right: 5        Neural Tension/Mobility:      Left side:Slump or Jose-Lumbar  negative.   Right side:   Slump positive.  Right side:   Jose-Lumbar  negative.                                                                                              Musculoskeletal:        Legs:        ROS    Assessment/Plan:    Patient is a 59 year old female with lumbar complaints.    Patient has the following significant findings with corresponding treatment plan.                Diagnosis 1:  Right sided LBP without sciatica  Pain -  self management, education and home program  Decreased ROM/flexibility - manual therapy and therapeutic exercise  Decreased joint mobility - manual therapy and therapeutic exercise  Decreased strength - therapeutic exercise and therapeutic activities  Impaired gait - gait training  Impaired muscle performance - neuro re-education  Decreased function - therapeutic activities    Therapy Evaluation Codes:   Cumulative Therapy Evaluation is: Low complexity.    Previous and current functional limitations:  (See Goal Flow Sheet for this information)    Short term and Long term goals: (See Goal Flow Sheet for this information)     Communication ability:  Patient appears to be able to clearly communicate and understand verbal and written communication and follow directions correctly.  Treatment Explanation - The following has been discussed with the patient:   RX ordered/plan of care  Anticipated outcomes  Possible risks and side effects  This patient would benefit from PT intervention to resume normal activities.   Rehab potential is excellent.    Frequency:  1 X week, once daily  Duration:  for 8 visits  Discharge Plan:  Achieve all LTG.  Independent in home treatment program.  Reach maximal therapeutic benefit.    Please refer to the daily flowsheet for treatment today, total treatment time and time spent performing 1:1 timed codes.

## 2020-10-02 ENCOUNTER — THERAPY VISIT (OUTPATIENT)
Dept: PHYSICAL THERAPY | Facility: CLINIC | Age: 60
End: 2020-10-02
Payer: COMMERCIAL

## 2020-10-02 DIAGNOSIS — M54.50 CHRONIC RIGHT-SIDED LOW BACK PAIN WITHOUT SCIATICA: Primary | ICD-10-CM

## 2020-10-02 DIAGNOSIS — G89.29 CHRONIC RIGHT-SIDED LOW BACK PAIN WITHOUT SCIATICA: Primary | ICD-10-CM

## 2020-10-02 PROCEDURE — 97110 THERAPEUTIC EXERCISES: CPT | Mod: GP | Performed by: PHYSICAL THERAPIST

## 2020-10-02 PROCEDURE — 97530 THERAPEUTIC ACTIVITIES: CPT | Mod: GP | Performed by: PHYSICAL THERAPIST

## 2020-10-02 NOTE — PROGRESS NOTES
Subjective:  HPI  Physical Exam                    Objective:  System    Physical Exam    General     ROS    Assessment/Plan:    PROGRESS  REPORT    Progress reporting period is from 9/18/2020 to 10/2/2020.       SUBJECTIVE  Subjective changes noted by patient:  .  Subjective: Pt states that she has noted less pain and tightness in LB and leg.  Has noticed which ex cause pulling in her leg and modified.  Sleep is improved.      Current pain level is   .     Previous pain level was   Initial Pain level: 6/10.   Changes in function:  Yes (See Goal flowsheet attached for changes in current functional level)  Adverse reaction to treatment or activity: None    OBJECTIVE  Changes noted in objective findings:    Objective: Educated re:  nerve location and how to avoid irritating.  Discussed the importance of form when pulling and lifting while doing yard work.  Discussed what is OK to feel with ex and when an exercise or activity maybe over doing.  Restarted ex from previous perscription that will be helpful with strength.  Pt will work on ex for next several weeks.  Will reach out to therapist as needed.  If doing well, will then DC from [facility] with HEP.     ASSESSMENT/PLAN  Updated problem list and treatment plan: Diagnosis 1:  Right sided LBP   Pain -  self management, education and home program  Decreased ROM/flexibility - manual therapy and therapeutic exercise  Decreased strength - therapeutic exercise and therapeutic activities  Impaired muscle performance - neuro re-education  Decreased function - therapeutic activities  STG/LTGs have been met or progress has been made towards goals:  Yes (See Goal flow sheet completed today.)  Assessment of Progress: The patient's condition is improving.  Self Management Plans:  Patient has been instructed in a home treatment program.  Patient  has been instructed in self management of symptoms.    Casie continues to require the following intervention to meet STG and LTG's:   PT    Recommendations:  Pt will check back with therapist as needed.    Please refer to the daily flowsheet for treatment today, total treatment time and time spent performing 1:1 timed codes.

## 2020-11-14 ENCOUNTER — HEALTH MAINTENANCE LETTER (OUTPATIENT)
Age: 60
End: 2020-11-14

## 2021-02-25 ENCOUNTER — THERAPY VISIT (OUTPATIENT)
Dept: PHYSICAL THERAPY | Facility: CLINIC | Age: 61
End: 2021-02-25
Payer: COMMERCIAL

## 2021-02-25 DIAGNOSIS — G89.29 CHRONIC RIGHT-SIDED LOW BACK PAIN WITHOUT SCIATICA: Primary | ICD-10-CM

## 2021-02-25 DIAGNOSIS — M54.50 CHRONIC RIGHT-SIDED LOW BACK PAIN WITHOUT SCIATICA: Primary | ICD-10-CM

## 2021-02-25 PROCEDURE — 97112 NEUROMUSCULAR REEDUCATION: CPT | Mod: 95 | Performed by: PHYSICAL THERAPIST

## 2021-02-25 PROCEDURE — 97110 THERAPEUTIC EXERCISES: CPT | Mod: 95 | Performed by: PHYSICAL THERAPIST

## 2021-02-25 NOTE — PROGRESS NOTES
Subjective:  HPI  Physical Exam                    Objective:  System    Physical Exam    General     ROS    Assessment/Plan:    PROGRESS  REPORT    Progress reporting period is from 10/2/2020 to 2/25/2021.       SUBJECTIVE  Subjective changes noted by patient:  .  Subjective: Pt states that she suffered darryl exacerbation of sx 3-4 wks ago.  Is uncertain as to cause.  Is noting sharp pain in right groin, right LB and buttock pain.      Current pain level is   .     Previous pain level was   Initial Pain level: 6/10.   Changes in function:  Suffered an exacerbation.  Adverse reaction to treatment or activity: None    OBJECTIVE  Changes noted in objective findings:    Objective: Extension feels good, > reproduction of pain is with left SB on the right.  Modified ex program.  Started flossing to femoral n.     ASSESSMENT/PLAN  Updated problem list and treatment plan: Diagnosis 1:  R sided LBP without sciatica  Pain -  self management, education and home program  Decreased ROM/flexibility - manual therapy and therapeutic exercise  Decreased joint mobility - manual therapy and therapeutic exercise  Decreased strength - therapeutic exercise and therapeutic activities  Impaired muscle performance - neuro re-education  Decreased function - therapeutic activities  Impaired posture - neuro re-education  STG/LTGs have been met or progress has been made towards goals:  Yes (See Goal flow sheet completed today.)  Assessment of Progress: The patient's condition has potential to improve.  Self Management Plans:  Patient has been instructed in a home treatment program.    Casie continues to require the following intervention to meet STG and LTG's:  PT    Recommendations:  This patient would benefit from continued therapy.     Frequency:  1 X week, once daily  Duration:  for 3-5 visits        Please refer to the daily flowsheet for treatment today, total treatment time and time spent performing 1:1 timed codes.

## 2021-03-11 ENCOUNTER — THERAPY VISIT (OUTPATIENT)
Dept: PHYSICAL THERAPY | Facility: CLINIC | Age: 61
End: 2021-03-11
Payer: COMMERCIAL

## 2021-03-11 DIAGNOSIS — M54.50 CHRONIC RIGHT-SIDED LOW BACK PAIN WITHOUT SCIATICA: Primary | ICD-10-CM

## 2021-03-11 DIAGNOSIS — G89.29 CHRONIC RIGHT-SIDED LOW BACK PAIN WITHOUT SCIATICA: Primary | ICD-10-CM

## 2021-03-11 PROCEDURE — 97110 THERAPEUTIC EXERCISES: CPT | Mod: 95 | Performed by: PHYSICAL THERAPIST

## 2021-03-18 DIAGNOSIS — F41.1 GAD (GENERALIZED ANXIETY DISORDER): ICD-10-CM

## 2021-03-22 RX ORDER — ESCITALOPRAM OXALATE 5 MG/1
5 TABLET ORAL DAILY
Qty: 90 TABLET | Refills: 1 | Status: SHIPPED | OUTPATIENT
Start: 2021-03-22 | End: 2021-10-04

## 2021-05-04 ENCOUNTER — IMMUNIZATION (OUTPATIENT)
Dept: NURSING | Facility: CLINIC | Age: 61
End: 2021-05-04
Payer: COMMERCIAL

## 2021-05-04 PROCEDURE — 0001A PR COVID VAC PFIZER DIL RECON 30 MCG/0.3 ML IM: CPT

## 2021-05-04 PROCEDURE — 91300 PR COVID VAC PFIZER DIL RECON 30 MCG/0.3 ML IM: CPT

## 2021-05-25 ENCOUNTER — IMMUNIZATION (OUTPATIENT)
Dept: NURSING | Facility: CLINIC | Age: 61
End: 2021-05-25
Attending: INTERNAL MEDICINE
Payer: COMMERCIAL

## 2021-05-25 PROCEDURE — 0002A PR COVID VAC PFIZER DIL RECON 30 MCG/0.3 ML IM: CPT

## 2021-05-25 PROCEDURE — 91300 PR COVID VAC PFIZER DIL RECON 30 MCG/0.3 ML IM: CPT

## 2021-06-23 ENCOUNTER — MYC MEDICAL ADVICE (OUTPATIENT)
Dept: FAMILY MEDICINE | Facility: CLINIC | Age: 61
End: 2021-06-23

## 2021-06-26 DIAGNOSIS — Z12.11 SCREEN FOR COLON CANCER: Primary | ICD-10-CM

## 2021-07-07 ENCOUNTER — TELEPHONE (OUTPATIENT)
Dept: GASTROENTEROLOGY | Facility: CLINIC | Age: 61
End: 2021-07-07

## 2021-07-07 ENCOUNTER — HOSPITAL ENCOUNTER (OUTPATIENT)
Facility: AMBULATORY SURGERY CENTER | Age: 61
End: 2021-07-07
Attending: INTERNAL MEDICINE
Payer: COMMERCIAL

## 2021-07-07 DIAGNOSIS — Z11.59 ENCOUNTER FOR SCREENING FOR OTHER VIRAL DISEASES: ICD-10-CM

## 2021-07-07 NOTE — TELEPHONE ENCOUNTER
"Screening Questions  1. Are you active on mychart? YES    2. What insurance is in the chart? PREF. ONE    2.  Ordering/Referring Provider: Sherice Jamil MD PhD in [facility]IC[facility]    3. BMI 22.3 160# 5' 11\"    4. Are you on daily home oxygen? NO    5. Do you have a history of difficult airway? NO    6. Have you had a heart, lung, or liver transplant? NO    7. Are you currently on dialysis? NO    8. Have you had a stroke or Transient ischemic atttack (TIA) within 6 months? NO    9. In the past 6 months, have you had any heart related issues including cardiomyopathy or heart attack?         If yes, did it require cardiac stenting or other implantable device?NO    10. Do you have any implantable devices in your body (pacemaker, defib, LVAD)? NO    11. Do you take nitroglycerin? If yes, how often? NO    12. Are you currently taking any blood thinners? NO    13. Are you a diabetic? NO    14. (Females) Are you currently pregnant? NO  If yes, how many weeks?    15. Have you had a procedure in the past that was difficult to tolerate with conscious sedation? Any allergies to Fentanyl or Versed NO    16. Are you taking any scheduled prescription narcotics more than once daily? NO    17. Do you have any chemical dependencies such as alcohol, street drugs, or methadone? NO    18. Do you have any history of post-traumatic stress syndrome or mental health issues? Depression    19. Do you transfer independently? YES    20.  Do you have any issues with constipation? NO    21. Preferred Pharmacy for Pre Prescription  [facility]     Scheduling Details    Procedure Scheduled: COLONOSCOPY   Provider/Surgeon: DR. HARTLEY   Date of Procedure: 08/12/2021  Location[place] [facility]   Caller (Please ask for phone number if not scheduled by patient): PATIENT      Sedation Type: C. SEDATION -PT is ok with this know about difficult colon   Conscious Sedation- Needs  for 6 hours after the " procedure  MAC/General-Needs  for 24 hours after procedure    Pre-op Required at [facility] [place], and OR for MAC sedation:   (if yes advise patient they will need a pre-op prior to procedure)      Is patient on blood thinners? -no  (If yes- inform patient to follow up with PCP or provider for follow up instructions)     Informed patient they will need an adult  yes  Cannot take any type of public or medical transportation alone    Informed Patient of COVID Test Requirement yes- scheduled     Confirmed Nurse will call to complete assessment yes    Additional comments:

## 2021-07-21 ENCOUNTER — HOSPITAL ENCOUNTER (OUTPATIENT)
Facility: CLINIC | Age: 61
End: 2021-07-21
Attending: INTERNAL MEDICINE | Admitting: INTERNAL MEDICINE
Payer: COMMERCIAL

## 2021-07-21 ENCOUNTER — TELEPHONE (OUTPATIENT)
Dept: GASTROENTEROLOGY | Facility: CLINIC | Age: 61
End: 2021-07-21

## 2021-07-21 DIAGNOSIS — Z11.59 ENCOUNTER FOR SCREENING FOR OTHER VIRAL DISEASES: ICD-10-CM

## 2021-07-21 NOTE — TELEPHONE ENCOUNTER
Caller: Casie Anne    Procedure: Colonoscopy    Date of Procedure Cancelled: 8/12    Ordering Provider:Sherice Jamil MD PhD    Reason for cancel: Patient would like to schedule with provider who does the water exchange method.     Rescheduled: Y     If rescheduled     Date: 8/11    Location: [facility]    Note any change or update to original order/sedation: Location and provider change

## 2021-08-03 ENCOUNTER — TELEPHONE (OUTPATIENT)
Dept: GASTROENTEROLOGY | Facility: CLINIC | Age: 61
End: 2021-08-03

## 2021-08-03 DIAGNOSIS — Z12.11 SPECIAL SCREENING FOR MALIGNANT NEOPLASMS, COLON: Primary | ICD-10-CM

## 2021-08-03 RX ORDER — BISACODYL 5 MG
TABLET, DELAYED RELEASE (ENTERIC COATED) ORAL
Qty: 2 TABLET | Refills: 0 | Status: SHIPPED | OUTPATIENT
Start: 2021-08-03 | End: 2021-09-29

## 2021-08-03 NOTE — TELEPHONE ENCOUNTER
Attempted to contact patient regarding upcoming colonoscopy procedure on 8/11/21 for pre assessment questions. No answer.     Left message to return call to [phone] #2    Covid test scheduled: 8/9/21    Arrival time: 0800    Facility location: [facility]    Sedation type: CS    Bowel prep recommendation: Extended prep. (meme Guan)    Extended prep sent to [id number] - [place], [place] - [address]. Prep instructions sent via [employer].     Ginny Flores RN

## 2021-08-03 NOTE — TELEPHONE ENCOUNTER
Patient returned call.     Pre assessment questions completed for upcoming colonoscopy procedure scheduled on 8/11/20    COVID test scheduled 8/9/21    Reviewed Extended prep instructions with patient. Patient was concerned that Golytely prep was not going to clean her bowels out as she was told that miralax is a better prep for her colonoscopies.     Informed patient that Extended prep is more fluid consumption than the regular Golytely prep. Extended prep is what provider has all patient's do for colonoscopies. If patient does not prefer to do Golytely prep than would need to be rescheduled under another provider.     Patient wishes to continue with scheduled procedure and will do Extended prep as recommended.     Procedural arrival time and facility location reviewed.    Designated  policy reviewed.    Patient verbalized understanding and had no questions or concerns at this time.    Ginny Flores RN

## 2021-08-09 ENCOUNTER — LAB (OUTPATIENT)
Dept: LAB | Facility: CLINIC | Age: 61
End: 2021-08-09
Payer: COMMERCIAL

## 2021-08-09 DIAGNOSIS — Z11.59 ENCOUNTER FOR SCREENING FOR OTHER VIRAL DISEASES: ICD-10-CM

## 2021-08-09 LAB — SARS-COV-2 RNA RESP QL NAA+PROBE: NEGATIVE

## 2021-08-09 PROCEDURE — U0005 INFEC AGEN DETEC AMPLI PROBE: HCPCS

## 2021-08-09 PROCEDURE — U0003 INFECTIOUS AGENT DETECTION BY NUCLEIC ACID (DNA OR RNA); SEVERE ACUTE RESPIRATORY SYNDROME CORONAVIRUS 2 (SARS-COV-2) (CORONAVIRUS DISEASE [COVID-19]), AMPLIFIED PROBE TECHNIQUE, MAKING USE OF HIGH THROUGHPUT TECHNOLOGIES AS DESCRIBED BY CMS-2020-01-R: HCPCS

## 2021-08-11 ENCOUNTER — TELEPHONE (OUTPATIENT)
Dept: GASTROENTEROLOGY | Facility: CLINIC | Age: 61
End: 2021-08-11

## 2021-08-11 NOTE — TELEPHONE ENCOUNTER
Caller: Casie Tsangkarynachelle    Procedure: colonoscopy     Date of Procedure Cancelled: 8/11/2021    Ordering Provider:Sherice Jamil    Reason for cancel: pt was unable to handle the prep    Rescheduled: not at the time of the call     If rescheduled:    Date:    Location:    Note any change or update to original order/sedation:

## 2021-09-12 ENCOUNTER — HEALTH MAINTENANCE LETTER (OUTPATIENT)
Age: 61
End: 2021-09-12

## 2021-09-28 NOTE — PROGRESS NOTES
"    Assessment & Plan     Tinnitus, bilateral  Long standing but worse lately   - Otolaryngology Referral    Decreased hearing of left ear  Decreased hearing by pt report - has been awhile since had hearing tested  - Adult Audiology Referral    Anxiety  Using low dose of lexapro intermittently - no counseling - seems to work for her - using klonopin very sparingly  PHQ9=0  BECCA =2  - clonazePAM (KLONOPIN) 0.5 MG ODT  Dispense: 10 tablet; Refill: 0      F/U prn     Sherice Jamil MD PhD  [facility]    Time note (e4, 30'): The total time (on the date of service) for this service was >30 minutes, including discussion/face-to-face, chart review, interpretation not otherwise reported, documentation, and updating of the computerized record.    Subjective   Casie Anne is a 60 year old who presents for the following health issues  Having tinnitus and hearing problems    HPI She has had long standing tinnitus and mainly left ear - also feels that hearing is down - no dizziness.      Right forearm has a lesion -has had this a long time  ?if growing    Using klonopin for flying.. -0.5mg prn     Hx of anxiety.  Intermittently uses lexapro -- helps her anxiety - no counseling - no panic attacks, takes a low dose of lexapro and currently on it.      Had chronic pelvic pain - as chronic tingling in abdominal, back and pelvic area started gabapentin 3 wks ago - seems to be helping      Review of Systems   Constitutional, HEENT, cardiovascular, pulmonary, GI, , musculoskeletal, neuro, skin, endocrine and psych systems are negative, except as otherwise noted.      Objective    LMP 03/25/2012  /74 (BP Location: Right arm, Patient Position: Sitting, Cuff Size: Adult Regular)   Pulse 77   Temp 98  F (36.7  C) (Oral)   Resp 16   Ht 1.803 m (5' 11\")   Wt 74.4 kg (164 lb)   LMP 03/25/2012   SpO2 99%   BMI 22.87 kg/m      Body mass index is 22.87 kg/m .  Physical Exam   GENERAL: " healthy, alert and no distress  NECK: no adenopathy, no asymmetry, masses, or scars and thyroid normal to palpation  RESP: lungs clear to auscultation - no rales, rhonchi or wheezes  CV: regular rate and rhythm, normal S1 S2, no S3 or S4, no murmur, click or rub, no peripheral edema and peripheral pulses strong  MS: no gross musculoskeletal defects noted, no edema  SKIN: on right forearm has small raised hemangioma no other suspicious lesions or rashes  NEURO: Normal strength and tone, mentation intact and speech normal  PSYCH: mentation appears normal, affect normal/bright    Sherice Jamil MD, PhD

## 2021-09-29 ENCOUNTER — OFFICE VISIT (OUTPATIENT)
Dept: FAMILY MEDICINE | Facility: CLINIC | Age: 61
End: 2021-09-29
Payer: COMMERCIAL

## 2021-09-29 VITALS
HEART RATE: 77 BPM | SYSTOLIC BLOOD PRESSURE: 116 MMHG | TEMPERATURE: 98 F | DIASTOLIC BLOOD PRESSURE: 74 MMHG | RESPIRATION RATE: 16 BRPM | HEIGHT: 71 IN | WEIGHT: 164 LBS | OXYGEN SATURATION: 99 % | BODY MASS INDEX: 22.96 KG/M2

## 2021-09-29 DIAGNOSIS — H91.92 DECREASED HEARING OF LEFT EAR: ICD-10-CM

## 2021-09-29 DIAGNOSIS — F41.9 ANXIETY: ICD-10-CM

## 2021-09-29 DIAGNOSIS — H93.13 TINNITUS, BILATERAL: Primary | ICD-10-CM

## 2021-09-29 PROCEDURE — 99214 OFFICE O/P EST MOD 30 MIN: CPT | Performed by: FAMILY MEDICINE

## 2021-09-29 RX ORDER — CLONAZEPAM 0.5 MG/1
0.5 TABLET, ORALLY DISINTEGRATING ORAL 2 TIMES DAILY PRN
Qty: 10 TABLET | Refills: 0 | Status: SHIPPED | OUTPATIENT
Start: 2021-09-29 | End: 2022-11-28

## 2021-09-29 RX ORDER — GABAPENTIN 300 MG/1
300 CAPSULE ORAL DAILY
COMMUNITY
End: 2022-10-25

## 2021-09-29 ASSESSMENT — PATIENT HEALTH QUESTIONNAIRE - PHQ9
5. POOR APPETITE OR OVEREATING: SEVERAL DAYS
SUM OF ALL RESPONSES TO PHQ QUESTIONS 1-9: 0

## 2021-09-29 ASSESSMENT — MIFFLIN-ST. JEOR: SCORE: 1410.03

## 2021-09-29 ASSESSMENT — ANXIETY QUESTIONNAIRES
1. FEELING NERVOUS, ANXIOUS, OR ON EDGE: NOT AT ALL
IF YOU CHECKED OFF ANY PROBLEMS ON THIS QUESTIONNAIRE, HOW DIFFICULT HAVE THESE PROBLEMS MADE IT FOR YOU TO DO YOUR WORK, TAKE CARE OF THINGS AT HOME, OR GET ALONG WITH OTHER PEOPLE: NOT DIFFICULT AT ALL
GAD7 TOTAL SCORE: 2
6. BECOMING EASILY ANNOYED OR IRRITABLE: SEVERAL DAYS
2. NOT BEING ABLE TO STOP OR CONTROL WORRYING: NOT AT ALL
3. WORRYING TOO MUCH ABOUT DIFFERENT THINGS: NOT AT ALL
5. BEING SO RESTLESS THAT IT IS HARD TO SIT STILL: NOT AT ALL
7. FEELING AFRAID AS IF SOMETHING AWFUL MIGHT HAPPEN: NOT AT ALL

## 2021-09-29 ASSESSMENT — PAIN SCALES - GENERAL: PAINLEVEL: NO PAIN (0)

## 2021-09-29 NOTE — PATIENT INSTRUCTIONS
[facility] Refill Request Information:    Please contact your pharmacy regarding any request for medication refills. The [facility] prescription fax number is [phone]. Please allow three business days for routine medication refills and five business days for controlled substance medication refills.    [facility] Test Result Notification Information:    You will be notified within seven to ten day of your appointment regarding your test results. If you are on MyChart, you will be notified as soon as the provider has reviewed and signed the results.     If you need anything else, feel free to contact the Primary Care Center at [phone].    [phone] (4th Floor [facility])   Audiology [phone] (4th Floor [facility])

## 2021-09-29 NOTE — NURSING NOTE
Chief Complaint   Patient presents with     Referral     Patient comes in to discuss ENT referral.          Jason Clark MA on 9/29/2021 at 3:18 PM

## 2021-09-30 ASSESSMENT — ANXIETY QUESTIONNAIRES: GAD7 TOTAL SCORE: 2

## 2021-10-03 DIAGNOSIS — F41.1 GAD (GENERALIZED ANXIETY DISORDER): ICD-10-CM

## 2021-10-04 RX ORDER — ESCITALOPRAM OXALATE 5 MG/1
5 TABLET ORAL DAILY
Qty: 90 TABLET | Refills: 3 | Status: SHIPPED | OUTPATIENT
Start: 2021-10-04 | End: 2022-10-25

## 2021-10-05 NOTE — TELEPHONE ENCOUNTER
FUTURE VISIT INFORMATION      FUTURE VISIT INFORMATION:    Date: 11/18/2021    Time: 10:10AM    Location: [facility]  REFERRAL INFORMATION:    Referring provider:  Sherice Jamil MD PhD    Referring providers clinic:  [facility] [place]     Reason for visit/diagnosis  Tinnitus, bilateral, referred by Sherice Jamil MD PhD, per Pt    RECORDS REQUESTED FROM:       Clinic name Comments Records Status Imaging Status   [facility]  9/29/2021 note from Sherice Jamil MD PhD EPIC    [facility] [place] 8/17/2017 note from Dr Best  8/17/2017 audiogram [facility]

## 2021-11-07 ENCOUNTER — HEALTH MAINTENANCE LETTER (OUTPATIENT)
Age: 61
End: 2021-11-07

## 2021-11-13 PROBLEM — M54.50 CHRONIC RIGHT-SIDED LOW BACK PAIN WITHOUT SCIATICA: Status: RESOLVED | Noted: 2018-10-09 | Resolved: 2021-11-13

## 2021-11-13 PROBLEM — G89.29 CHRONIC RIGHT-SIDED LOW BACK PAIN WITHOUT SCIATICA: Status: RESOLVED | Noted: 2018-10-09 | Resolved: 2021-11-13

## 2021-11-13 NOTE — PROGRESS NOTES
DISCHARGE REPORT    Casie did not return for further treatment after the last visit on 3/11/21.   Current status is unknown.  Please see information below for last known relevant information.  Patient seen for 4 visits.    SUBJECTIVE  Subjective changes noted by patient:  Pt feels that sx have changed.  Still will note right anterior pain, but is different in nature.  Awoke with pain -may be due to sleeping postion.    .  Current pain level is  .     Previous pain level was  6/10.   Changes in function: (See Goal flowsheet attached for changes in current functional level)  Adverse reaction to treatment or activity: None    OBJECTIVE  Changes noted in objective findings:   Main change in ex was femoral nerve flossing.  Reviewed and changed form slightly.       ASSESSMENT/PLAN  Diagnosis: R sided LBP without sciatica   Updated problem list and treatment plan:  Patient will continue to utilize HEP for any remaining deficits.   STG/LTGs have been met or progress has been made towards goals:  Please see goal flowsheet for most current information  Assessment of Progress: current status is unknown.    Last current status: Pt is progressing as expected   Self Management Plans:  HEP  I have re-evaluated this patient and find that the nature, scope, duration and intensity of the therapy is appropriate for the medical condition of the patient.  Casie continues to require the following intervention to meet STG and LTG's:  HEP.    Recommendations:  Discharge with current home program.  Patient to follow up with MD as needed.    Please refer to the daily flowsheet for treatment today, total treatment time and time spent performing 1:1 timed codes.

## 2021-11-18 ENCOUNTER — OFFICE VISIT (OUTPATIENT)
Dept: OTOLARYNGOLOGY | Facility: CLINIC | Age: 61
End: 2021-11-18
Payer: COMMERCIAL

## 2021-11-18 ENCOUNTER — PRE VISIT (OUTPATIENT)
Dept: OTOLARYNGOLOGY | Facility: CLINIC | Age: 61
End: 2021-11-18

## 2021-11-18 ENCOUNTER — OFFICE VISIT (OUTPATIENT)
Dept: AUDIOLOGY | Facility: CLINIC | Age: 61
End: 2021-11-18
Payer: COMMERCIAL

## 2021-11-18 VITALS
OXYGEN SATURATION: 98 % | HEIGHT: 71 IN | BODY MASS INDEX: 23.94 KG/M2 | TEMPERATURE: 97.3 F | WEIGHT: 171 LBS | HEART RATE: 67 BPM

## 2021-11-18 DIAGNOSIS — H90.3 ASYMMETRIC SNHL (SENSORINEURAL HEARING LOSS): Primary | ICD-10-CM

## 2021-11-18 DIAGNOSIS — R51.9 HEADACHE, CHRONIC DAILY: ICD-10-CM

## 2021-11-18 DIAGNOSIS — R42 VERTIGO: Primary | ICD-10-CM

## 2021-11-18 DIAGNOSIS — H93.13 TINNITUS, BILATERAL: ICD-10-CM

## 2021-11-18 DIAGNOSIS — H91.92 DECREASED HEARING OF LEFT EAR: ICD-10-CM

## 2021-11-18 PROCEDURE — 99203 OFFICE O/P NEW LOW 30 MIN: CPT | Mod: 25 | Performed by: REGISTERED NURSE

## 2021-11-18 PROCEDURE — 92550 TYMPANOMETRY & REFLEX THRESH: CPT | Performed by: AUDIOLOGIST

## 2021-11-18 PROCEDURE — 92557 COMPREHENSIVE HEARING TEST: CPT | Performed by: AUDIOLOGIST

## 2021-11-18 PROCEDURE — 92504 EAR MICROSCOPY EXAMINATION: CPT | Performed by: REGISTERED NURSE

## 2021-11-18 ASSESSMENT — PAIN SCALES - GENERAL: PAINLEVEL: NO PAIN (0)

## 2021-11-18 ASSESSMENT — MIFFLIN-ST. JEOR: SCORE: 1436.78

## 2021-11-18 NOTE — PROGRESS NOTES
Otolaryngology Clinic  November 18, 2021    Chief Complaint:   Bilateral Tinnitus       History of Present Illness:   Casie Harris is a 61 year old female who presents today for further evaluation of bilateral tinnitus. Patient was seen in 2017 for left sided tinnitus. Asymmetric SNHL was noted on audiogram. MRI brain was completed that was normal. Recommended masking strategies and monitoring. Symptoms had been stable until about the last couple months when she began to note new right-sided tinnitus and concern for worsening hearing.   Patient states that she continues to manage tinnitus well throughout the day. Most bothersome at night when she is trying to sleep. Patient uses a noise machine for masking but is now unable to hear noise machine when she lays on her right side. Also notes difficulty with consonants and watching TV. She is here to monitor hearing and make sure that new symptoms are not due to wax impaction.    Patient also notes an episode of vertigo that occurred about 1 week ago.  Reports waking up with room spinning dizziness.  Patient attempted head turning exercises which did cause acute nausea and vomiting.  Symptoms slowly resolved over the course of a few hours. Patient did not note any change in hearing, increased ear fullness, or worsening tinnitus at the time of her vertigo. Patient reports ongoing headaches since vertigo event.  Patient has taken 's migraine medicine for headache which have improved symptoms.    Patient denies any otalgia, otorrhea, facial numbness/weakness, history of frequent ear infections, or ear surgeries.     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     Autoimmune disease (H) 1987    Chronic Fatigue Syndrome     Colon polyps 2014     Sensorineural hearing loss 2012, 2017     Tinnitus April 2017       Past Surgical History:  Past Surgical History:   Procedure Laterality Date      "COLONOSCOPY  03/2014    4 polyps, repeat 3-5 yrs,fragments of tubular adenoma     COLONOSCOPY N/A 5/4/2018    Procedure: COMBINED COLONOSCOPY, SINGLE OR MULTIPLE BIOPSY/POLYPECTOMY BY BIOPSY;  Colonoscopy;  Surgeon: Jaycee Day MD;  [facility] polyp was hyper[facility] - repeeat in 3 yrs because of poor prep     DILATION AND CURETTAGE  1994     GYN SURGERY  09/2018    right ovarian cyst and ovary removed and bilateral fallopian tubes, and fibroid      HPV  1988    laser surgery  for HPV and was on 5FU     LASER CO2 CERVIX       Medications:  Current Outpatient Medications   Medication Sig Dispense Refill     Cholecalciferol (VITAMIN D) 2000 UNIT CAPS Take 2 daily       clonazePAM (KLONOPIN) 0.5 MG ODT Take 1 tablet (0.5 mg) by mouth 2 times daily as needed for anxiety 10 tablet 0     escitalopram (LEXAPRO) 5 MG tablet Take 1 tablet (5 mg) by mouth daily 90 tablet 3     gabapentin (NEURONTIN) 300 MG capsule Take 300 mg by mouth daily       Probiotic Product (PROBIOTIC DAILY PO) Take by mouth as needed        Allergies:  Allergies   Allergen Reactions     Nkda [No Known Drug Allergies]       Social History:  Social History     Tobacco Use     Smoking status: Never Smoker     Smokeless tobacco: Never Used     Tobacco comment: never smoked   Substance Use Topics     Alcohol use: Yes     Alcohol/week: 0.0 standard drinks     Comment: wine - 4 glasses/wk     Drug use: No     ROS: 10 point ROS neg other than the symptoms noted above in the HPI.    Physical Exam:    Pulse 67   Temp 97.3  F (36.3  C) (Temporal)   Ht 1.803 m (5' 11\")   Wt 77.6 kg (171 lb)   LMP 03/25/2012   SpO2 98%   BMI 23.85 kg/m       Constitutional:  The patient was unaccompanied, well-groomed, and in no acute distress.     Neurologic: Alert and oriented x 3.  HB 1/6.   Psychiatric: The patient's affect was calm, cooperative, and appropriate.     Communication:  Normal; communicates verbally, normal voice quality.    Respiratory: Breathing " comfortably without stridor or exertion of accessory muscles.    Eyes: Pupils were equal and reactive.  Extraocular movement intact.    Ears: Pinnae and tragus non-tender.        Otologic microscope exam:    Right ear was examined under the microscope.  Normal appearing TM, nicely aerated middle ear space.      Left ear was also examined under the microscope.  Normal appearing TM, nicely aerated middle ear space.         Audiogram: 11/18/2021- data independently reviewed  Right ear: Normal sloping to moderate sensorineural hearing loss  Left ear: Normal sloping to moderate sensorineural hearing loss (10-20 dB asymmetry from 250 to 4000 Hz with left being worse than right)  WR: 96% bilaterally at 70 dB  Acoustic Reflexes: Present in all conditions  Tympanograms: type A bilaterally       Assessment and Plan:  1. Tinnitus, bilateral  Patient with bilateral tinnitus.  Patient's ear exam is normal without cerumen impaction.  Reviewed audiogram with patient which does show a progressive sensorineural loss in both the right and left ear as compared to 2017 audiogram.  It is reassuring that patient had a normal MRI to rule out a retrocochlear lesion cause for her asymmetric hearing loss.  Recommend to continue tinnitus management strategies including masking.  Discussed benefit of cognitive behavioral therapy in managing tinnitus.  Recommend that patient follow-up in 1 to 2 years with a repeat audiogram to continue to monitor hearing    2. Vertigo  Patient with new acute episode of vertigo.  Unclear etiology with possibility of a vestibular neuritis or possible vertiginous migraine.  Recommend that patient continue to monitor and contact clinic if vertigo returns for vestibular therapy eval.    3. Headache, chronic daily  Patient with new chronic headache after vertigo episode.  Recommend that patient follow-up with PCP to discuss management of headache.    Patient will follow up 1 year with audiogram    Pippa Arciniega DNP,  APRN, CNP  Otolaryngology  Head & Neck Surgery  [phone]    40 minutes spent on the date of the encounter doing chart review, history and exam, documentation and further activities per the note

## 2021-11-18 NOTE — LETTER
11/18/2021       RE: Casie Harris  [address] [place] [address]     Dear Colleague,    Thank you for referring your patient, Casie Harris, to the [facility] EAR NOSE AND THRO[facility]IC [place] [facility]. Please see a copy of my visit note below.      Otolaryngology Clinic  November 18, 2021    Chief Complaint:   Bilateral Tinnitus       History of Present Illness:   Casie Harris is a 61 year old female who presents today for further evaluation of bilateral tinnitus. Patient was seen in 2017 for left sided tinnitus. Asymmetric SNHL was noted on audiogram. MRI brain was completed that was normal. Recommended masking strategies and monitoring. Symptoms had been stable until about the last couple months when she began to note new right-sided tinnitus and concern for worsening hearing.   Patient states that she continues to manage tinnitus well throughout the day. Most bothersome at night when she is trying to sleep. Patient uses a noise machine for masking but is now unable to hear noise machine when she lays on her right side. Also notes difficulty with consonants and watching TV. She is here to monitor hearing and make sure that new symptoms are not due to wax impaction.    Patient also notes an episode of vertigo that occurred about 1 week ago.  Reports waking up with room spinning dizziness.  Patient attempted head turning exercises which did cause acute nausea and vomiting.  Symptoms slowly resolved over the course of a few hours. Patient did not note any change in hearing, increased ear fullness, or worsening tinnitus at the time of her vertigo. Patient reports ongoing headaches since vertigo event.  Patient has taken 's migraine medicine for headache which have improved symptoms.    Patient denies any otalgia, otorrhea, facial numbness/weakness, history of frequent ear infections, or ear surgeries.     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     Autoimmune disease (H) 1987    Chronic Fatigue Syndrome     Colon polyps 2014     Sensorineural hearing loss 2012, 2017     Tinnitus April 2017       Past Surgical History:  Past Surgical History:   Procedure Laterality Date     COLONOSCOPY  03/2014    4 polyps, repeat 3-5 yrs,fragments of tubular adenoma     COLONOSCOPY N/A 5/4/2018    Procedure: COMBINED COLONOSCOPY, SINGLE OR MULTIPLE BIOPSY/POLYPECTOMY BY BIOPSY;  Colonoscopy;  Surgeon: Jaycee Day MD;  [facility]: [facility] polyp was hyperplastic - repeeat in 3 yrs because of poor prep     DILATION AND CURETTAGE  1994     GYN SURGERY  09/2018    right ovarian cyst and ovary removed and bilateral fallopian tubes, and fibroid      HPV  1988    laser surgery  for HPV and was on 5FU     LASER CO2 CERVIX       Medications:  Current Outpatient Medications   Medication Sig Dispense Refill     Cholecalciferol (VITAMIN D) 2000 UNIT CAPS Take 2 daily       clonazePAM (KLONOPIN) 0.5 MG ODT Take 1 tablet (0.5 mg) by mouth 2 times daily as needed for anxiety 10 tablet 0     escitalopram (LEXAPRO) 5 MG tablet Take 1 tablet (5 mg) by mouth daily 90 tablet 3     gabapentin (NEURONTIN) 300 MG capsule Take 300 mg by mouth daily       Probiotic Product (PROBIOTIC DAILY PO) Take by mouth as needed        Allergies:  Allergies   Allergen Reactions     Nkda [No Known Drug Allergies]       Social History:  Social History     Tobacco Use     Smoking status: Never Smoker     Smokeless tobacco: Never Used     Tobacco comment: never smoked   Substance Use Topics     Alcohol use: Yes     Alcohol/week: 0.0 standard drinks     Comment: wine - 4 glasses/wk     Drug use: No     ROS: 10 point ROS neg other than the symptoms noted above in the HPI.    Physical Exam:    Pulse 67   Temp 97.3  F (36.3  C) (Temporal)   Ht 1.803 m (5' 11\")   Wt 77.6 kg (171 lb)   LMP 03/25/2012   SpO2 98%   " BMI 23.85 kg/m       Constitutional:  The patient was unaccompanied, well-groomed, and in no acute distress.     Neurologic: Alert and oriented x 3.  HB 1/6.   Psychiatric: The patient's affect was calm, cooperative, and appropriate.     Communication:  Normal; communicates verbally, normal voice quality.    Respiratory: Breathing comfortably without stridor or exertion of accessory muscles.    Eyes: Pupils were equal and reactive.  Extraocular movement intact.    Ears: Pinnae and tragus non-tender.        Otologic microscope exam:    Right ear was examined under the microscope.  Normal appearing TM, nicely aerated middle ear space.      Left ear was also examined under the microscope.  Normal appearing TM, nicely aerated middle ear space.         Audiogram: 11/18/2021- data independently reviewed  Right ear: Normal sloping to moderate sensorineural hearing loss  Left ear: Normal sloping to moderate sensorineural hearing loss (10-20 dB asymmetry from 250 to 4000 Hz with left being worse than right)  WR: 96% bilaterally at 70 dB  Acoustic Reflexes: Present in all conditions  Tympanograms: type A bilaterally       Assessment and Plan:  1. Tinnitus, bilateral  Patient with bilateral tinnitus.  Patient's ear exam is normal without cerumen impaction.  Reviewed audiogram with patient which does show a progressive sensorineural loss in both the right and left ear as compared to 2017 audiogram.  It is reassuring that patient had a normal MRI to rule out a retrocochlear lesion cause for her asymmetric hearing loss.  Recommend to continue tinnitus management strategies including masking.  Discussed benefit of cognitive behavioral therapy in managing tinnitus.  Recommend that patient follow-up in 1 to 2 years with a repeat audiogram to continue to monitor hearing    2. Vertigo  Patient with new acute episode of vertigo.  Unclear etiology with possibility of a vestibular neuritis or possible vertiginous migraine.  Recommend  that patient continue to monitor and contact clinic if vertigo returns for vestibular therapy eval.    3. Headache, chronic daily  Patient with new chronic headache after vertigo episode.  Recommend that patient follow-up with PCP to discuss management of headache.    Patient will follow up 1 year with audiogram    Pippa Arciniega DNP, APRN, CNP  Otolaryngology  Head & Neck Surgery  [phone]    40 minutes spent on the date of the encounter doing chart review, history and exam, documentation and further activities per the note        Again, thank you for allowing me to participate in the care of your patient.      Sincerely,    Montserrat Arciniega, NP

## 2021-11-18 NOTE — PROGRESS NOTES
AUDIOLOGY REPORT    SUMMARY: Audiology visit completed. See audiogram for results.      RECOMMENDATIONS: Follow-up with ENT.    Iraida Randle M.A.   Audiology Doctoral Student    I was present with the patient for the entire Audiology appointment including all procedures/testing performed by the AuD student, and agree with the student s assessment and plan as documented.      Mehrdad Elias.  Licensed Audiologist  [place] #[id number]

## 2021-12-06 ENCOUNTER — MYC MEDICAL ADVICE (OUTPATIENT)
Dept: FAMILY MEDICINE | Facility: CLINIC | Age: 61
End: 2021-12-06
Payer: COMMERCIAL

## 2021-12-06 DIAGNOSIS — M54.9 BACK PAIN: Primary | ICD-10-CM

## 2021-12-07 DIAGNOSIS — M54.50 CHRONIC LOW BACK PAIN, UNSPECIFIED BACK PAIN LATERALITY, UNSPECIFIED WHETHER SCIATICA PRESENT: Primary | ICD-10-CM

## 2021-12-07 DIAGNOSIS — G89.29 CHRONIC LOW BACK PAIN, UNSPECIFIED BACK PAIN LATERALITY, UNSPECIFIED WHETHER SCIATICA PRESENT: Primary | ICD-10-CM

## 2021-12-07 NOTE — PROGRESS NOTES
12-7-21 physical therapy order written for low back pain - wants to go [facility]  Sherice Jamil MD, PhD

## 2021-12-09 ENCOUNTER — IMMUNIZATION (OUTPATIENT)
Dept: NURSING | Facility: CLINIC | Age: 61
End: 2021-12-09
Payer: COMMERCIAL

## 2021-12-09 PROCEDURE — 91300 PR COVID VAC PFIZER DIL RECON 30 MCG/0.3 ML IM: CPT

## 2021-12-09 PROCEDURE — 0004A PR COVID VAC PFIZER DIL RECON 30 MCG/0.3 ML IM: CPT

## 2021-12-21 ENCOUNTER — MYC REFILL (OUTPATIENT)
Dept: FAMILY MEDICINE | Facility: CLINIC | Age: 61
End: 2021-12-21
Payer: COMMERCIAL

## 2021-12-21 DIAGNOSIS — F41.9 ANXIETY: ICD-10-CM

## 2021-12-21 RX ORDER — CLONAZEPAM 0.5 MG/1
0.5 TABLET, ORALLY DISINTEGRATING ORAL 2 TIMES DAILY PRN
Qty: 10 TABLET | Refills: 0 | Status: CANCELLED | OUTPATIENT
Start: 2021-12-21

## 2021-12-27 RX ORDER — CLONAZEPAM 0.5 MG/1
0.5 TABLET ORAL 2 TIMES DAILY PRN
Qty: 10 TABLET | Refills: 0 | Status: SHIPPED | OUTPATIENT
Start: 2021-12-27 | End: 2023-05-30

## 2022-03-07 ENCOUNTER — ANCILLARY PROCEDURE (OUTPATIENT)
Dept: MAMMOGRAPHY | Facility: CLINIC | Age: 62
End: 2022-03-07
Attending: FAMILY MEDICINE
Payer: COMMERCIAL

## 2022-03-07 DIAGNOSIS — Z12.31 VISIT FOR SCREENING MAMMOGRAM: ICD-10-CM

## 2022-03-07 PROCEDURE — 77067 SCR MAMMO BI INCL CAD: CPT | Mod: TC | Performed by: RADIOLOGY

## 2022-03-09 ENCOUNTER — MYC MEDICAL ADVICE (OUTPATIENT)
Dept: FAMILY MEDICINE | Facility: CLINIC | Age: 62
End: 2022-03-09
Payer: COMMERCIAL

## 2022-03-11 ENCOUNTER — DOCUMENTATION ONLY (OUTPATIENT)
Dept: FAMILY MEDICINE | Facility: CLINIC | Age: 62
End: 2022-03-11
Payer: COMMERCIAL

## 2022-03-11 NOTE — PROGRESS NOTES
3-11-22  Pt needs further imaging of the left breast - pt concern for need of an exam - I told her to get imaging and if still a concern then will have her seen at the breast center.   Sherice Jamil MD, PhD

## 2022-03-18 ENCOUNTER — ANCILLARY PROCEDURE (OUTPATIENT)
Dept: MAMMOGRAPHY | Facility: CLINIC | Age: 62
End: 2022-03-18
Attending: FAMILY MEDICINE
Payer: COMMERCIAL

## 2022-03-18 DIAGNOSIS — R92.8 ABNORMAL MAMMOGRAM: ICD-10-CM

## 2022-03-18 PROCEDURE — 77065 DX MAMMO INCL CAD UNI: CPT | Mod: LT | Performed by: STUDENT IN AN ORGANIZED HEALTH CARE EDUCATION/TRAINING PROGRAM

## 2022-03-18 PROCEDURE — 77061 BREAST TOMOSYNTHESIS UNI: CPT | Mod: LT | Performed by: STUDENT IN AN ORGANIZED HEALTH CARE EDUCATION/TRAINING PROGRAM

## 2022-06-23 NOTE — PATIENT INSTRUCTIONS
HISTORY OF PRESENT ILLNESS     HPI  Nurse's note has been reviewed and I agree with its content. SUBJECTIVE:  Luis Fisher is a 34year old female presenting with complaint of vaginal irritation. Patient notes itching and discharge as well. Prone to yeast infections. PAST MEDICAL, FAMILY AND SOCIAL HISTORY     The following histories were personally reviewed and updated. Current medications, Allergies, Past Medical History and Social History    REVIEW OF SYSTEMS     Review of Systems   Constitutional: Negative for chills and fever. Gastrointestinal: Negative for abdominal pain. Genitourinary: Positive for vaginal discharge. Negative for dysuria, frequency, hematuria and urgency. PHYSICAL EXAM     Vitals:    06/22/22 1908   BP: 120/83   Pulse: 97   Resp: 18   Temp: 97.8 Â°F (36.6 Â°C)   TempSrc: Temporal   SpO2: 99%   Weight: 79 kg (174 lb 3.2 oz)   LMP: 05/16/2022       Physical Exam  Vitals and nursing note reviewed. HENT:      Head: Normocephalic. Pulmonary:      Effort: Pulmonary effort is normal.   Genitourinary:     Comments: Patient defers pelvic, self swab performed to obtain specimen  Psychiatric:         Mood and Affect: Mood normal.         Behavior: Behavior normal.       Walk In on 06/22/2022   Component Date Value Ref Range Status   â¢ CLUE CELLS, WET MOUNT 06/22/2022 None Seen  None Seen Final   â¢ TRICHOMONAS, WET MOUNT 06/22/2022 None Seen  None Seen Final   â¢ YEAST, WET MOUNT 06/22/2022 None Seen  None Seen Final       ASSESSMENT/PLAN     Sumi was seen today for yeast infection. Diagnoses and all orders for this visit:    Acute vaginitis  -     WET MOUNT      Patient presents with vaginitis. Negative wet prep today. Advised proper hygiene. Follow up in 2-3 days if no improvement, advised she will need pelvic at that time. Patient agrees with plan of care.     Patient seen and evaluated by Gabe Saeed PA-C    Collaborating Physician: Sedrick Negro MD You will have an MRI done - you will be called with the results.  You may call [facility] for [facility]- they have an open sided MRI.  [phone]   Please call our clinic for any questions,concerns,or worsening symptoms.      Clinic #[phone]       Option 3  for the triage nurse.   Vee ENT Nurse

## 2022-07-26 ENCOUNTER — MYC MEDICAL ADVICE (OUTPATIENT)
Dept: FAMILY MEDICINE | Facility: CLINIC | Age: 62
End: 2022-07-26

## 2022-09-06 NOTE — PROGRESS NOTES
Assessment & Plan     Pelvic pain in female  This is questionable etiology - she doesn't appear to have lichen sclerosis (no biopsy was ever done) and the perineum doesn't have white epithelium. What she experiences is more funny sensation - I encouraged her to go back on the vaginal estrogen cream twice/wk and this would keep the perineal tissue healthy    BECCA (generalized anxiety disorder)  She has a hx of anxiety and lexapro helps - I encouraged her to restart the lexapro and stay on it    Tingling sensation  This is total body tingling - sounds like nerve irritation   And gabapentin seemed to help.  She has seen several providers trying to sort this out.  I encouraged her to continue on the 600mg of gabapentin daily    F/u 1 month       Return in about 4 weeks (around 10/5/2022) for Routine Visit.    Sherice Jamil MD PhD  [facility] PRIMARY CARE [facility]    Time note (e5, 40'): The total time (on the date of service) for this service was 49 minutes, including discussion/face-to-face, chart review, interpretation not otherwise reported, documentation, and updating of the computerized record.    Subjective   Casie Anne is a 61 year old presenting for the following health issues:  Pelvic discomfort     HPI 62 yo female has body tingling for several years intermittently.  Has seen multiple specialists. Her main concern today is pelvic discomfort.       She has been seen by a gynecologist this past 2 yrs  for various reasons.  She had a suspicious cyst on her right ovary and in 2018 had a bilateral oophorectomy and 1 ovary was removed.  At that time the gynecologist mentioned to her that she had endometriosis. I don't have those records.   It was also mentioned at another visit because she was having vaginal symptoms that she might have lichen sclerosis.  However no biopsy was done.  She was treated  with strong steroid cream for a week and then given a lesser strength steroid cream.  She also  was encouraged to use vaginal estrogen which she has used  sporadically but not regularly.  She gets the vaginal estrogen cream compounded at [facility].  She does have dryness in the vaginal area and this tingling sensation.  She is also seeing dermatology and they felt that it was not lichen sclerosus that it was possibly atopic dermatitis in the perineum.  Currently she has less symptoms of itching, no vaginal bleeding no vaginal discharge and she has some dryness and discomfort with intercourse.  She is concerned as to what the symptoms mean.    She also has chronic bloating.  She also feels a fullness  like there is fluid in the lower suprapubic area.      She was given gabapentin for the tingling or the nerve pain.  The dose was recently increased to 600 mg a day for 2 months.  She feels that her body tingling is better but she still has symptoms in the pelvic area.      She also has a history of anxiety.  She has been on Lexapro since 2018.  She stopped that intermittently but then when she tries to get back on it she has increasing anxiety.  She is willing to go back on it she does feel good when she is on it.      Had a physical with Dr Vela (gyn)  in 1/2022.  Had a pap.       ROS - endorses periodic hot flashes - abdominal/pelvic pain, swelling of joints - tingling in pelvic area - the rest of the complete ROS: Constitutional, HEENT, cardiovascular, pulmonary, GI, , musculoskeletal, neuro, skin, endocrine and psych systems are negative, except as otherwise noted.      Objective    LMP 03/25/2012   Body mass index is 23.38 kg/m .     Physical Exam   GENERAL: healthy, alert and no distress  ABDOMEN: soft, nontender, no hepatosplenomegaly, no masses and bowel sounds normal - no suprapubic tenderness    (female):atrophic  female external genitalia, no whitish skin, normal urethral meatus, vaginal mucosa thin and smooth and pale, cervix anterior and uterus retroverted and nontender  and no adnexal tenderness  or masses   MS: no gross musculoskeletal defects noted, no edema    Sherice Jamil MD, PhD

## 2022-09-07 ENCOUNTER — OFFICE VISIT (OUTPATIENT)
Dept: FAMILY MEDICINE | Facility: CLINIC | Age: 62
End: 2022-09-07
Payer: COMMERCIAL

## 2022-09-07 VITALS
OXYGEN SATURATION: 99 % | BODY MASS INDEX: 23.46 KG/M2 | DIASTOLIC BLOOD PRESSURE: 76 MMHG | WEIGHT: 167.6 LBS | HEART RATE: 67 BPM | HEIGHT: 71 IN | SYSTOLIC BLOOD PRESSURE: 114 MMHG

## 2022-09-07 DIAGNOSIS — R10.2 PELVIC PAIN IN FEMALE: Primary | ICD-10-CM

## 2022-09-07 DIAGNOSIS — F41.1 GAD (GENERALIZED ANXIETY DISORDER): ICD-10-CM

## 2022-09-07 DIAGNOSIS — R20.2 TINGLING SENSATION: ICD-10-CM

## 2022-09-07 PROCEDURE — 99215 OFFICE O/P EST HI 40 MIN: CPT | Performed by: FAMILY MEDICINE

## 2022-09-07 NOTE — PATIENT INSTRUCTIONS
Start the estrogen twice weekly  My chart me what you are getting from the compound pharmacy  Continue the gabapentin 600mg daily  Restart the lexapro 5mg/day  See me 1 month

## 2022-09-07 NOTE — NURSING NOTE
Casie Harris is a 61 year old female patient that presents today in clinic for the following:    Chief Complaint   Patient presents with     RECHECK     Body tingling     The patient's allergies and medications were reviewed as noted. A set of vitals were recorded as noted without incident. The patient does not have any other questions for the provider.    Tim Pond, EMT at 4:36 PM on 9/7/2022

## 2022-09-26 DIAGNOSIS — N95.2 VAGINAL ATROPHY: Primary | ICD-10-CM

## 2022-09-26 RX ORDER — ESTRADIOL 10 UG/1
10 INSERT VAGINAL
Qty: 24 TABLET | Refills: 3 | Status: SHIPPED | OUTPATIENT
Start: 2022-09-26 | End: 2023-01-11

## 2022-11-08 ENCOUNTER — MYC MEDICAL ADVICE (OUTPATIENT)
Dept: FAMILY MEDICINE | Facility: CLINIC | Age: 62
End: 2022-11-08

## 2022-11-11 ENCOUNTER — ALLIED HEALTH/NURSE VISIT (OUTPATIENT)
Dept: INTERNAL MEDICINE | Facility: CLINIC | Age: 62
End: 2022-11-11
Payer: COMMERCIAL

## 2022-11-11 DIAGNOSIS — H61.23 BILATERAL IMPACTED CERUMEN: Primary | ICD-10-CM

## 2022-11-11 PROCEDURE — 69209 REMOVE IMPACTED EAR WAX UNI: CPT | Mod: 50

## 2022-11-11 NOTE — PROGRESS NOTES
Casie Harris presents in the primary clinic today at the request of the patient for an ear wash of their right ear, and possibly their left. The patient's ears were assessed for cerumen. After this, an ear wash was performed in which a minimal amount of impacted cerumen was removed from the right and left ear/s. After the ear wash, the tympanic membrane was visible. The ear wash was provided today under the supervision of Dr. Cartagena who was present if help was needed.    Patient stated that they had a previous condition of chronic tinnitus.    Sharonda Gill, EMT at 10:42 AM on 11/11/2022.  Primary Care Clinic: [phone]

## 2022-11-19 ENCOUNTER — HEALTH MAINTENANCE LETTER (OUTPATIENT)
Age: 62
End: 2022-11-19

## 2022-11-28 ENCOUNTER — MYC MEDICAL ADVICE (OUTPATIENT)
Dept: FAMILY MEDICINE | Facility: CLINIC | Age: 62
End: 2022-11-28

## 2022-11-28 DIAGNOSIS — F41.9 ANXIETY: ICD-10-CM

## 2022-11-28 RX ORDER — CLONAZEPAM 0.5 MG/1
0.5 TABLET, ORALLY DISINTEGRATING ORAL 2 TIMES DAILY PRN
Qty: 10 TABLET | Refills: 0 | Status: SHIPPED | OUTPATIENT
Start: 2022-11-28 | End: 2023-08-02

## 2023-01-10 NOTE — PROGRESS NOTES
Assessment & Plan     Lump of right wrist  Unclear how long it has been there - likely a subcutaneous nodule - will get opinion from general surgery  - Adult General Surg Referral    Anxiety  Better - on lexapro      Tingling sensation  overall improved since started gabapentin - unclear etiology - had a complete neuro work up a few years ago  - continue gabapentin    Saw Dr Brooks 1/2022 for annual exam with pap     Return in about 6 months (around 7/11/2023) for Physical Exam.    Sherice Jamil MD PhD  [facility] PRIMARY CARE [facility]    Time note (e4, 30'): The total time (on the date of service) for this service was 30 minutes, including discussion/face-to-face, chart review, interpretation not otherwise reported, documentation, and updating of the computerized record.      Subjective   Casie Anne is a 62 year old, presenting for the following health issues:  anxiety    HPI 63 yo with hx of anxiety.  Had a phone visit end of Nov and klonopin was renewed - comes in today in follow up. She remains on  lexapro.      Her total body tingling has gotten somewhat better.  But still has sensations in finger and feet, back and torso.   We had started gabapentin 300mg bid.      She is also using vaginal cream (compounded estrogen cream) and does it about 1x every 2 wks.  She thinks it might help but breasts feel full.    Has a nodule on right forearm - right handed - not sure how long it has been there- does not hurt   -     Review of Systems   Constitutional, HEENT, cardiovascular, pulmonary, GI, , musculoskeletal, neuro, skin, endocrine and psych systems are negative, except as otherwise noted.      Objective    LMP 03/25/2012   Body mass index is 23.98 kg/m .   /79 (BP Location: Right arm, Patient Position: Sitting, Cuff Size: Adult Regular)   Pulse 66   Wt 78 kg (171 lb 14.4 oz)   LMP 03/25/2012   SpO2 99%   BMI 23.98 kg/m      Physical Exam   GENERAL: healthy, alert and no  distress  NECK: no adenopathy, no asymmetry, masses, or scars and thyroid normal to palpation  RESP: lungs clear to auscultation - no rales, rhonchi or wheezes  CV: regular rate and rhythm, normal S1 S2, no S3 or S4, no murmur, click or rub, no peripheral edema   MS: right forearm there is a firm subcutaneous mass - nontender, and moblie -   no gross musculoskeletal defects noted, no edema  NEURO: Normal strength and tone, mentation intact and speech normal  PSYCH: mentation appears normal, affect normal/bright  LYMPH: no cervical, supraclavicular  adenopathy    Sherice Jamil MD, PhD

## 2023-01-11 ENCOUNTER — OFFICE VISIT (OUTPATIENT)
Dept: FAMILY MEDICINE | Facility: CLINIC | Age: 63
End: 2023-01-11
Payer: COMMERCIAL

## 2023-01-11 VITALS
SYSTOLIC BLOOD PRESSURE: 117 MMHG | WEIGHT: 171.9 LBS | HEART RATE: 66 BPM | BODY MASS INDEX: 23.98 KG/M2 | DIASTOLIC BLOOD PRESSURE: 79 MMHG | OXYGEN SATURATION: 99 %

## 2023-01-11 DIAGNOSIS — R22.31 LUMP OF RIGHT WRIST: ICD-10-CM

## 2023-01-11 DIAGNOSIS — F41.9 ANXIETY: ICD-10-CM

## 2023-01-11 DIAGNOSIS — R20.2 TINGLING SENSATION: Primary | ICD-10-CM

## 2023-01-11 PROCEDURE — 99214 OFFICE O/P EST MOD 30 MIN: CPT | Performed by: FAMILY MEDICINE

## 2023-01-11 RX ORDER — ESTRADIOL 0.1 MG/G
2 CREAM VAGINAL
COMMUNITY

## 2023-01-11 NOTE — NURSING NOTE
Casie Harris is a 62 year old female that presents in clinic today for the following:     Chief Complaint   Patient presents with     Follow Up     Pt here for 4 week follow up       The patient's allergies and medications were reviewed. The patient's vitals were obtained without incident. The patient does not have any other questions for the provider.     Tomás Gagnon, EMT at 4:21 PM on 1/11/2023.  Primary Care Clinic: [phone]

## 2023-01-12 NOTE — PATIENT INSTRUCTIONS
See general surgery about lump on right forearm,  Continue the gabapentin  Continue the lexapro.

## 2023-01-16 ENCOUNTER — TELEPHONE (OUTPATIENT)
Dept: FAMILY MEDICINE | Facility: CLINIC | Age: 63
End: 2023-01-16

## 2023-01-16 NOTE — TELEPHONE ENCOUNTER
LVM with pcc number for pt to call back to schedule annual physical with Dr Sherice Jamil for around 7/11/23

## 2023-01-18 ENCOUNTER — PRE VISIT (OUTPATIENT)
Dept: ORTHOPEDICS | Facility: CLINIC | Age: 63
End: 2023-01-18

## 2023-01-18 ENCOUNTER — ANCILLARY PROCEDURE (OUTPATIENT)
Dept: GENERAL RADIOLOGY | Facility: CLINIC | Age: 63
End: 2023-01-18
Attending: FAMILY MEDICINE
Payer: COMMERCIAL

## 2023-01-18 ENCOUNTER — OFFICE VISIT (OUTPATIENT)
Dept: ORTHOPEDICS | Facility: CLINIC | Age: 63
End: 2023-01-18
Payer: COMMERCIAL

## 2023-01-18 DIAGNOSIS — M25.531 RIGHT WRIST PAIN: Primary | ICD-10-CM

## 2023-01-18 DIAGNOSIS — M65.4 TENDINITIS, DE QUERVAIN'S: ICD-10-CM

## 2023-01-18 PROCEDURE — 73110 X-RAY EXAM OF WRIST: CPT | Mod: RT | Performed by: RADIOLOGY

## 2023-01-18 PROCEDURE — 99204 OFFICE O/P NEW MOD 45 MIN: CPT | Performed by: FAMILY MEDICINE

## 2023-01-18 NOTE — TELEPHONE ENCOUNTER
DIAGNOSIS: right wrist pain    APPOINTMENT DATE: 1.18.23   NOTES STATUS DETAILS   OFFICE NOTE from other specialist Internal 1.11.23 Sherice Jamil MD PhD   MEDICATION LIST Internal

## 2023-01-18 NOTE — PROGRESS NOTES
CHIEF COMPLAINT:  Pain of the Right Wrist       HISTORY OF PRESENT ILLNESS  Ms. Harris is a pleasant 62 year old female who presents to clinic today with right wrist pain.  Casie Anne has had pain in her right wrist for the past few weeks, no single inciting event that she can recall.  She points to the radial aspect of her wrist, sometimes pain radiates up her wrist.  This is worse with gripping, but will grab on occasion and be quite painful.        Additional history: as documented    MEDICAL HISTORY  Patient Active Problem List   Diagnosis     BECCA (generalized anxiety disorder)     Fibrocystic breast changes     Encounter for gynecological examination without abnormal finding     Tinnitus     HSV-1 infection     Complex cyst of right ovary       Current Outpatient Medications   Medication Sig Dispense Refill     Cholecalciferol (VITAMIN D) 2000 UNIT CAPS Take 2 daily       clonazePAM (KLONOPIN) 0.5 MG ODT Take 1 tablet (0.5 mg) by mouth 2 times daily as needed for anxiety 10 tablet 0     clonazePAM (KLONOPIN) 0.5 MG tablet Take 1 tablet (0.5 mg) by mouth 2 times daily as needed for anxiety 10 tablet 0     escitalopram (LEXAPRO) 5 MG tablet Take 1 tablet (5 mg) by mouth daily 90 tablet 3     estradiol (ESTRACE) 0.1 MG/GM vaginal cream Place 2 g vaginally twice a week Compounded by [facility]       gabapentin (NEURONTIN) 300 MG capsule Take 1 capsule (300 mg) by mouth 2 times daily 180 capsule 3     Probiotic Product (PROBIOTIC DAILY PO) Take by mouth as needed          Allergies   Allergen Reactions     Nkda [No Known Drug Allergies]        Family History   Problem Relation Age of Onset     Prostate Cancer Father      Heart Disease Father      Cardiovascular Father         AF CHF     Cancer Father         BCC     Neurologic Disorder Father      Cerebrovascular Disease Sister      Lipids Sister      Heart Disease Maternal Grandfather      Heart Disease Paternal Grandmother      Cerebrovascular Disease  Mother      Hypertension Sister      Breast Cancer Paternal Aunt      Suicide Sister      Depression Sister      Mental Illness Sister      Breast Cancer Other        Additional medical/Social/Surgical histories reviewed in EPIC and updated as appropriate.        PHYSICAL EXAM  General  - normal appearance, in no obvious distress  Musculoskeletal - right wrist  - inspection: no atrophy, normal joint alignment, no swelling  - palpation: mild tenderness over the radiocarpal joint  - ROM:  90 deg flexion   70 deg extension   25 deg abduction   65 deg adduction  - strength: 5/5  strength, 5/5 wrist abduction, 5/5 flexion, extension, pronation, supination, adduction  - special tests:  (+) Finkelstein  Neuro  - no numbness, no motor deficit, grossly normal coordination, normal muscle tone               ASSESSMENT & PLAN  Ms. Harris is a 62 year old female who presents to clinic today with right wrist pain.    I ordered and independently reviewed an x-ray of her wrist, this shows no obvious acute or chronic issues.    She does have clinical evidence of de Quervain's, we discussed pathophysiology and treatment strategies that include immobilization, medication management, and offloading.    I did provide her a wrist brace, I am also referring her to hand therapy.  Lastly, I do recommend topical treatment with diclofenac gel, as helpful.    If her symptoms are not resolving over the course of the next 4 to 6 weeks I would likely recommend imaging.    It was a pleasure seeing Casie today.    Gustavo Carpio DO, CAM  Primary Care Sports Medicine      This note was constructed using Dragon dictation software, please excuse any minor errors in spelling, grammar, or syntax.

## 2023-01-18 NOTE — LETTER
1/18/2023      RE: Casie Harris  [address] [place] [address]     Dear Colleague,    Thank you for referring your patient, Casie Harris, to the [facility] [place]. Please see a copy of my visit note below.    CHIEF COMPLAINT:  Pain of the Right Wrist       HISTORY OF PRESENT ILLNESS  Ms. Harris is a pleasant 62 year old female who presents to clinic today with right wrist pain.  Casie Anne has had pain in her right wrist for the past few weeks, no single inciting event that she can recall.  She points to the radial aspect of her wrist, sometimes pain radiates up her wrist.  This is worse with gripping, but will grab on occasion and be quite painful.        Additional history: as documented    MEDICAL HISTORY  Patient Active Problem List   Diagnosis     BECCA (generalized anxiety disorder)     Fibrocystic breast changes     Encounter for gynecological examination without abnormal finding     Tinnitus     HSV-1 infection     Complex cyst of right ovary       Current Outpatient Medications   Medication Sig Dispense Refill     Cholecalciferol (VITAMIN D) 2000 UNIT CAPS Take 2 daily       clonazePAM (KLONOPIN) 0.5 MG ODT Take 1 tablet (0.5 mg) by mouth 2 times daily as needed for anxiety 10 tablet 0     clonazePAM (KLONOPIN) 0.5 MG tablet Take 1 tablet (0.5 mg) by mouth 2 times daily as needed for anxiety 10 tablet 0     escitalopram (LEXAPRO) 5 MG tablet Take 1 tablet (5 mg) by mouth daily 90 tablet 3     estradiol (ESTRACE) 0.1 MG/GM vaginal cream Place 2 g vaginally twice a week Compounded by [facility]       gabapentin (NEURONTIN) 300 MG capsule Take 1 capsule (300 mg) by mouth 2 times daily 180 capsule 3     Probiotic Product (PROBIOTIC DAILY PO) Take by mouth as needed          Allergies   Allergen Reactions     Nkda [No Known Drug Allergies]        Family History   Problem Relation Age of Onset     Prostate Cancer Father      Heart Disease  Father      Cardiovascular Father         AF CHF     Cancer Father         BCC     Neurologic Disorder Father      Cerebrovascular Disease Sister      Lipids Sister      Heart Disease Maternal Grandfather      Heart Disease Paternal Grandmother      Cerebrovascular Disease Mother      Hypertension Sister      Breast Cancer Paternal Aunt      Suicide Sister      Depression Sister      Mental Illness Sister      Breast Cancer Other        Additional medical/Social/Surgical histories reviewed in EPIC and updated as appropriate.        PHYSICAL EXAM  General  - normal appearance, in no obvious distress  Musculoskeletal - right wrist  - inspection: no atrophy, normal joint alignment, no swelling  - palpation: mild tenderness over the radiocarpal joint  - ROM:  90 deg flexion   70 deg extension   25 deg abduction   65 deg adduction  - strength: 5/5  strength, 5/5 wrist abduction, 5/5 flexion, extension, pronation, supination, adduction  - special tests:  (+) Finkelstein  Neuro  - no numbness, no motor deficit, grossly normal coordination, normal muscle tone               ASSESSMENT & PLAN  Ms. Harris is a 62 year old female who presents to clinic today with right wrist pain.    I ordered and independently reviewed an x-ray of her wrist, this shows no obvious acute or chronic issues.    She does have clinical evidence of de Quervain's, we discussed pathophysiology and treatment strategies that include immobilization, medication management, and offloading.    I did provide her a wrist brace, I am also referring her to hand therapy.  Lastly, I do recommend topical treatment with diclofenac gel, as helpful.    If her symptoms are not resolving over the course of the next 4 to 6 weeks I would likely recommend imaging.    It was a pleasure seeing Casie today.    Gustavo Carpio DO, CAQSM  Primary Care Sports Medicine      This note was constructed using Dragon dictation software, please excuse any minor errors in  spelling, grammar, or syntax.

## 2023-02-06 ENCOUNTER — THERAPY VISIT (OUTPATIENT)
Dept: OCCUPATIONAL THERAPY | Facility: CLINIC | Age: 63
End: 2023-02-06
Attending: FAMILY MEDICINE
Payer: COMMERCIAL

## 2023-02-06 DIAGNOSIS — M65.4 TENDINITIS, DE QUERVAIN'S: ICD-10-CM

## 2023-02-06 PROCEDURE — 97535 SELF CARE MNGMENT TRAINING: CPT | Mod: GO | Performed by: OCCUPATIONAL THERAPIST

## 2023-02-06 PROCEDURE — 97165 OT EVAL LOW COMPLEX 30 MIN: CPT | Mod: GO | Performed by: OCCUPATIONAL THERAPIST

## 2023-02-06 PROCEDURE — 97760 ORTHOTIC MGMT&TRAING 1ST ENC: CPT | Mod: GO | Performed by: OCCUPATIONAL THERAPIST

## 2023-02-06 PROCEDURE — 97110 THERAPEUTIC EXERCISES: CPT | Mod: GO | Performed by: OCCUPATIONAL THERAPIST

## 2023-02-06 NOTE — PROGRESS NOTES
[facility] Initial Evaluation     Current Date: 2/6/2023    Diagnosis: Right wrist and thumb pain  DOI: January 2022    Subjective:  Patient Health History  Casie Harris being seen for wrist pain.       Problem occurred: not sure     General health as reported by patient is good.  Pertinent medical history includes: menopausal and numbness/tingling.     Medical allergies: none.   Surgeries include:  Other. Other surgery history details: removed fallopian tubes and one ovary.    Current medications:  Anti-depressants and other. Other medications details: gapapentin for nerve pain.    Current occupation is writer.   Primary job tasks include:  Computer work and prolonged sitting.                  Occupational Profile Information:  Right hand dominant  Prior functional level:  no limitations  Patient reports symptoms of pain and weakness/loss of strength  Special tests:  none  Previous treatment: none  Barriers include:none  Mobility: No difficulty  Transportation: drives  Currently working in normal job without restrictions  Leisure activities/hobbies: typing, yoga, cross country, reading, cleaning  Other: cooking, chopping    Functional Outcome Measure:   Upper Extremity Functional Index Score:  SCORE:   Column Totals: /80: (P) 73   (A lower score indicates greater disability.)    Objective:  Pain Level (Scale 0-10):   2/6/2023   At Rest 0   With Use 7     Pain Description:  Date 2/6/2023   Location wrist and thumb   Pain Quality Sharp, Shooting and Throbbing   Frequency intermittent     Pain is worst  daytime   Exacerbated by  use   Relieved by Voltarin   Progression unchanged     Sensation   WNL throughout all nerve distributions; per patient report    Edema   - none  + mild    ++ moderate    +++ severe    2/6/2023   1st DC +   Radial Styloid +      ROM  Pain Report: - none  + mild    ++ moderate    +++ severe   Thumb 2/6/2023 2/6/2023   AROM (PROM) Left Right   MP /45 /31   IP /90 /80   RABD 45- 45+    PABD 55 50 + to ++   Opposition 10 10-     Wrist 2/6/2023 2/6/2023   AROM (PROM) Left Right   Extension 73 70+   Flexion 70 70+   RD 20 20-   UD 30 26-   UD with Th Flex       Resisted Testing  Pain Report: - none  + mild    ++ moderate    +++ severe    2/6/2023   APL + to ++   EPB -   EPL -   FCR +     Strength   (Measured in pounds)  Pain Report: - none  + mild    ++ moderate    +++ severe    2/6/2023 2/6/2023   Trials Left Right   1  2  3     Average 58#+ 75#+     Lat Pinch 2/6/2023 2/6/2023   Trials Left Right   1  2  3     Average 16# 17#-     3 Pt Pinch 2/6/2023 2/6/2023   Trials Left Right   1  2  3     Average 17# 18#-     Special Tests   Pain Report:  - none    + mild    ++ moderate    +++ severe    2/6/2023   Finkelsteins ++   Radial Nerve Tinel's (DRSN) NT   Ap Joint Laxity of Trapezium NT   Crepitus Present NT   CMC Adduction Stress Test NT   CMC Extension Stress Test NT   WHAT test NT       Neural Tension Testing  RNT: Radial Neurodynamic Test (based on DS Gerson's ULNT)   2/6/2023   0-5 Scale NT   Position:   0/5: Arm across abdomen in coronal plane  1/5: Depress shoulder, ER to neutral ABD shoulder to 45 degrees  2/5: IR shoulder to end range, keep elbow at 90 degrees  3/5: Extend elbow to 0 degrees  4/5: Fully pronate forearm  5/5: Flex wrist and fingers with UD  Notes:    (+) indicates beyond grade level but less than custodial to next level    (-) indicates over custodial to level    S1  onset/change of patient's symptoms    S2 definite stop point based on patient's discomfort level    Palpation forearm  Pain Report:  - none    + mild    ++ moderate    +++ severe    2/6/2023   Radial Styloid +   1st DC ++   FCR ++   Thumb CMC +   PIN Site -   Extensor Wad ++   web ++         Assessment:  Patient presents with symptoms consistent with diagnosis of right wrist and thumb pain, with conservative intervention.     Patient's limitations or Problem List includes:  Pain, Weakness, Decreased ,  Decreased pinch and Tightness in musculature of the right wrist and thumb which interferes with the patient's ability to perform Self Care Tasks (dressing, eating, bathing, hygiene/toileting), Work Tasks, Sleep Patterns, Recreational Activities, Household Chores and Driving  as compared to previous level of function.    Rehab Potential:  Excellent - Return to full activity, no limitations    Patient will benefit from skilled Occupational Therapy to increase flexibility and overall strength and decrease pain to return to previous activity level and resume normal daily tasks and to reach their rehab potential.    Barriers to Learning:  No barrier    Communication Issues:  Patient appears to be able to clearly communicate and understand verbal and written communication and follow directions correctly.    Chart Review: Simple history review with patient    Identified Performance Deficits: bathing/showering, toileting, dressing, feeding, hygiene and grooming, care of others, driving and community mobility, health management and maintenance, home establishment and management, meal preparation and cleanup, shopping, sleep, work and leisure activities    Assessment of Occupational Performance:  5 or more Performance Deficits    Clinical Decision Making (Complexity): Low complexity    Treatment Explanation:  The following has been discussed with the patient:  RX ordered/plan of care  Anticipated outcomes  Possible risks and side effects    Plan:  Frequency:  1 X week, once daily  Duration:  for 6 visits    Treatment Plan:    Modalities:    US   Therapeutic Exercise:    AROM  Neuromuscular re-ed:   Nerve Gliding and Kinesiotaping  Manual Techniques:   Friction massage and Myofascial release  Orthotic Fabrication:    Static and Forearm based  Self Care:    Self Care Tasks, Ergonomic Considerations and Work Tasks    Discharge Plan:  Achieve all LTG.  Independent in home treatment program.  Reach maximal therapeutic  benefit.    Discharge Plan:    Achieve all LTG.  Independent in home treatment program.  Reach maximal therapeutic benefit.    Home Exercise Program:  Exercise Name: Self Elbow Mobilization, Trigger Point Massage Over Radial Head - Sessions: 1x every other day, Notes:  with hand or use a golf ball in top part of forearm wad and on bottom forearm wad  Exercise Name: [place] Massage to Thumb - Sessions: 1, Notes: Use a golf ball. Hold on tender spot for 30-60 seconds.    Exercise Name: Thumb Stabilization Web Space Release Method 1 with Clip - Sessions: 1, Notes: 2-3 minutes  Exercise Name: Ball Massage to Flexors, Sets: 1 - Sessions: 1x every other day, Notes: Roll up ball along underside of forearm  Cold pack 2-3 x/day, 5-10 min  DANTE thumb spica    Next Visit:  US or laser  MFR  Nerve gliding  K-tape

## 2023-02-13 ENCOUNTER — THERAPY VISIT (OUTPATIENT)
Dept: OCCUPATIONAL THERAPY | Facility: CLINIC | Age: 63
End: 2023-02-13
Payer: COMMERCIAL

## 2023-02-13 DIAGNOSIS — M65.4 TENDINITIS, DE QUERVAIN'S: Primary | ICD-10-CM

## 2023-02-13 PROCEDURE — 97140 MANUAL THERAPY 1/> REGIONS: CPT | Mod: GO | Performed by: OCCUPATIONAL THERAPIST

## 2023-02-13 PROCEDURE — 97035 APP MDLTY 1+ULTRASOUND EA 15: CPT | Mod: GO | Performed by: OCCUPATIONAL THERAPIST

## 2023-02-13 NOTE — PROGRESS NOTES
SOAP note objective information for 2/13/2023.    Please refer to the daily flowsheet for treatment today, total treatment time and time spent performing 1:1 timed codes.         Objective:  Pain Level (Scale 0-10):   2/6/23 2/13/23   At Rest 0 0   With Use 7 7-8     Pain Description:  Date 2/6/2023 2/13/2023   Location wrist and thumb    Pain Quality Sharp, Shooting and Throbbing    Frequency intermittent      Pain is worst  daytime    Exacerbated by  use    Relieved by Voltarin    Progression unchanged improving     Sensation   WNL throughout all nerve distributions; per patient report    Edema   - none  + mild    ++ moderate    +++ severe    2/6/2023    1st DC +    Radial Styloid +       ROM  Pain Report: - none  + mild    ++ moderate    +++ severe   Thumb 2/6/2023 2/6/2023    AROM (PROM) Left Right    MP /45 /31    IP /90 /80    RABD 45- 45+    PABD 55 50 + to ++    Opposition 10 10-      Wrist 2/6/2023 2/6/2023    AROM (PROM) Left Right    Extension 73 70+    Flexion 70 70+    RD 20 20-    UD 30 26-    UD with Th Flex        Resisted Testing  Pain Report: - none  + mild    ++ moderate    +++ severe    2/6/2023   APL + to ++   EPB -   EPL -   FCR +     Strength   (Measured in pounds)  Pain Report: - none  + mild    ++ moderate    +++ severe    2/6/2023 2/6/2023   Trials Left Right   1  2  3     Average 58#+ 75#+     Lat Pinch 2/6/2023 2/6/2023   Trials Left Right   1  2  3     Average 16# 17#-     3 Pt Pinch 2/6/2023 2/6/2023   Trials Left Right   1  2  3     Average 17# 18#-     Special Tests   Pain Report:  - none    + mild    ++ moderate    +++ severe    2/6/2023   Finkelsteins ++   Radial Nerve Tinel's (DRSN) NT   Ap Joint Laxity of Trapezium NT   Crepitus Present NT   CMC Adduction Stress Test NT   CMC Extension Stress Test NT   WHAT test NT       Neural Tension Testing  RNT: Radial Neurodynamic Test (based on DS Gerson's ULNT)   2/6/2023   0-5 Scale NT   Position:   0/5: Arm across abdomen in coronal  plane  1/5: Depress shoulder, ER to neutral ABD shoulder to 45 degrees  2/5: IR shoulder to end range, keep elbow at 90 degrees  3/5: Extend elbow to 0 degrees  4/5: Fully pronate forearm  5/5: Flex wrist and fingers with UD  Notes:    (+) indicates beyond grade level but less than alf to next level    (-) indicates over alf to level    S1  onset/change of patient's symptoms    S2 definite stop point based on patient's discomfort level    Palpation forearm  Pain Report:  - none    + mild    ++ moderate    +++ severe    2/6/2023 2/13/2023   Radial Styloid + -   1st DC ++ +   FCR ++ ++   Thumb CMC + 0   PIN Site -    Extensor Wad ++ ++   web ++ +          Treatment Plan:    Modalities:    US   Therapeutic Exercise:    AROM  Neuromuscular re-ed:   Nerve Gliding and Kinesiotaping  Manual Techniques:   Friction massage and Myofascial release  Orthotic Fabrication:    Static and Forearm based  Self Care:    Self Care Tasks, Ergonomic Considerations and Work Tasks    Discharge Plan:  Achieve all LTG.  Independent in home treatment program.  Reach maximal therapeutic benefit.    Discharge Plan:    Achieve all LTG.  Independent in home treatment program.  Reach maximal therapeutic benefit.    Home Exercise Program:  Exercise Name: Self Elbow Mobilization, Trigger Point Massage Over Radial Head - Sessions: 1x every other day, Notes:  with hand or use a golf ball in top part of forearm wad and on bottom forearm wad  Exercise Name: [place] Massage to Thumb - Sessions: 1, Notes: Use a golf ball. Hold on tender spot for 30-60 seconds.    Exercise Name: Thumb Stabilization Web Space Release Method 1 with Clip - Sessions: 1, Notes: 2-3 minutes  Exercise Name: Ball Massage to Flexors, Sets: 1 - Sessions: 1x every other day, Notes: Roll up ball along underside of forearm  Cold pack 2-3 x/day, 5-10 min  DANTE thumb spica    Next Visit:  US or laser  MFR  Nerve gliding  K-tape

## 2023-02-15 NOTE — PROGRESS NOTES
SOAP note objective information for 2/20/2023.    Please refer to the daily flowsheet for treatment today, total treatment time and time spent performing 1:1 timed codes.         Objective:  Pain Level (Scale 0-10):   2/6/23 2/13/23 2/20/23     At Rest 0 0 0     With Use 7 7-8 2-3       Pain Description:  Date 2/6/2023 2/13/2023   Location wrist and thumb    Pain Quality Sharp, Shooting and Throbbing    Frequency intermittent      Pain is worst  daytime    Exacerbated by  use    Relieved by Voltarin    Progression unchanged improving     Sensation   WNL throughout all nerve distributions; per patient report    Edema   - none  + mild    ++ moderate    +++ severe    2/6/2023    1st DC +    Radial Styloid +       ROM  Pain Report: - none  + mild    ++ moderate    +++ severe   Thumb 2/6/2023 2/6/2023 2/20/2023   AROM (PROM) Left Right Right   MP /45 /31    IP /90 /80    RABD 45- 45+ 45-   PABD 55 50 + to ++ 51-   Opposition 10 10-      Wrist 2/6/2023 2/6/2023 2/20/2023   AROM (PROM) Left Right Right   Extension 73 70+ 70-   Flexion 70 70+ 70-   RD 20 20-    UD 30 26-    UD with Th Flex        Resisted Testing  Pain Report: - none  + mild    ++ moderate    +++ severe    2/6/2023 2/20/2023   APL + to ++ tender   EPB -    EPL -    FCR +      Strength   (Measured in pounds)  Pain Report: - none  + mild    ++ moderate    +++ severe    2/6/2023 2/6/2023   Trials Left Right   1  2  3     Average 58#+ 75#+     Lat Pinch 2/6/2023 2/6/2023   Trials Left Right   1  2  3     Average 16# 17#-     3 Pt Pinch 2/6/2023 2/6/2023   Trials Left Right   1  2  3     Average 17# 18#-     Special Tests   Pain Report:  - none    + mild    ++ moderate    +++ severe    2/6/2023   Finkelsteins ++   Radial Nerve Tinel's (DRSN) NT   Ap Joint Laxity of Trapezium NT   Crepitus Present NT   CMC Adduction Stress Test NT   CMC Extension Stress Test NT   WHAT test NT       Neural Tension Testing  RNT: Radial Neurodynamic Test (based on AR Nieto's  ULNT)   2/6/2023   0-5 Scale NT   Position:   0/5: Arm across abdomen in coronal plane  1/5: Depress shoulder, ER to neutral ABD shoulder to 45 degrees  2/5: IR shoulder to end range, keep elbow at 90 degrees  3/5: Extend elbow to 0 degrees  4/5: Fully pronate forearm  5/5: Flex wrist and fingers with UD  Notes:    (+) indicates beyond grade level but less than detention to next level    (-) indicates over detention to level    S1  onset/change of patient's symptoms    S2 definite stop point based on patient's discomfort level    Palpation forearm  Pain Report:  - none    + mild    ++ moderate    +++ severe    2/6/2023 2/13/2023 2/20/2023   Radial Styloid + -    1st DC ++ + +   FCR ++ ++ ++   Thumb CMC + + tender   PIN Site -     Extensor Wad ++ ++ +   web ++ + +           Treatment Plan:    Modalities:    US   Therapeutic Exercise:    AROM  Neuromuscular re-ed:   Nerve Gliding and Kinesiotaping  Manual Techniques:   Friction massage and Myofascial release  Orthotic Fabrication:    Static and Forearm based  Self Care:    Self Care Tasks, Ergonomic Considerations and Work Tasks    Discharge Plan:  Achieve all LTG.  Independent in home treatment program.  Reach maximal therapeutic benefit.    Discharge Plan:    Achieve all LTG.  Independent in home treatment program.  Reach maximal therapeutic benefit.    Home Exercise Program:  Exercise Name: Self Elbow Mobilization, Trigger Point Massage Over Radial Head - Sessions: 1x every other day, Notes:  with hand or use a golf ball in top part of forearm wad and on bottom forearm wad  Exercise Name: [place] Massage to Thumb - Sessions: 1, Notes: Use a golf ball. Hold on tender spot for 30-60 seconds.    Exercise Name: Thumb Stabilization Web Space Release Method 1 with Clip - Sessions: 1, Notes: 2-3 minutes  Exercise Name: Ball Massage to Flexors, Sets: 1 - Sessions: 1x every other day, Notes: Roll up ball along underside of forearm  Cold pack 2-3 x/day, 5-10 min  DANTE thumb  maddie    2/20/2023  Double pencil massage  Wrist extensors/flexors stretch    Next Visit:  US or laser  MFR  Nerve gliding  K-tape

## 2023-02-20 ENCOUNTER — THERAPY VISIT (OUTPATIENT)
Dept: OCCUPATIONAL THERAPY | Facility: CLINIC | Age: 63
End: 2023-02-20
Payer: COMMERCIAL

## 2023-02-20 DIAGNOSIS — M65.4 TENDINITIS, DE QUERVAIN'S: Primary | ICD-10-CM

## 2023-02-20 PROCEDURE — 97035 APP MDLTY 1+ULTRASOUND EA 15: CPT | Mod: GO | Performed by: OCCUPATIONAL THERAPIST

## 2023-02-20 PROCEDURE — 97110 THERAPEUTIC EXERCISES: CPT | Mod: GO | Performed by: OCCUPATIONAL THERAPIST

## 2023-02-27 NOTE — PROGRESS NOTES
SOAP Note Objective Information for 2/28/2023.  Please refer to the daily flowsheet for treatment today, total treatment time and time spent performing 1:1 timed codes.       Pain Level (Scale 0-10):   2/6/23 2/13/23 2/20/23 2/28/23    At Rest 0 0 0 0    With Use 7 7-8 2-3 2      Pain Description:  Date 2/6/2023 2/13/2023   Location wrist and thumb    Pain Quality Sharp, Shooting and Throbbing    Frequency intermittent      Pain is worst  daytime    Exacerbated by  use    Relieved by Voltarin    Progression unchanged improving     Palpation forearm  Pain Report:  - none    + mild    ++ moderate    +++ severe    2/6/2023 2/13/2023 2/20/2023   Radial Styloid + -    1st DC ++ + +   FCR ++ ++ ++   Thumb CMC + + tender   PIN Site -     Extensor Wad ++ ++ +   web ++ + +     Home Exercise Program:  Self Elbow Mobilization, Trigger Point Massage Over Radial Head - Sessions: 1x every other day, Notes:  with hand or use a golf ball in top part of forearm wad and on bottom forearm wad  [place] Massage to Thumb - Sessions: 1, Notes: Use a golf ball. Hold on tender spot for 30-60 seconds.    Thumb Stabilization Web Space Release Method 1 with Clip - Sessions: 1, Notes: 2-3 minutes  Ball Massage to Flexors, Sets: 1 - Sessions: 1x every other day, Notes: Roll up ball along underside of forearm  Cold pack 2-3 x/day, 5-10 min  DANTE thumb spica    2/20/2023  Double pencil massage  Wrist extensors/flexors stretch    Next Visit:  US or laser  MFR  Nerve gliding  K-tape

## 2023-02-28 ENCOUNTER — THERAPY VISIT (OUTPATIENT)
Dept: OCCUPATIONAL THERAPY | Facility: CLINIC | Age: 63
End: 2023-02-28
Payer: COMMERCIAL

## 2023-02-28 DIAGNOSIS — M65.4 TENDINITIS, DE QUERVAIN'S: Primary | ICD-10-CM

## 2023-02-28 PROCEDURE — 97035 APP MDLTY 1+ULTRASOUND EA 15: CPT | Mod: GO | Performed by: OCCUPATIONAL THERAPIST

## 2023-02-28 PROCEDURE — 97140 MANUAL THERAPY 1/> REGIONS: CPT | Mod: GO | Performed by: OCCUPATIONAL THERAPIST

## 2023-02-28 PROCEDURE — 97110 THERAPEUTIC EXERCISES: CPT | Mod: GO | Performed by: OCCUPATIONAL THERAPIST

## 2023-03-09 NOTE — TELEPHONE ENCOUNTER
REFERRAL INFORMATION:    Referring Provider: Dr. Sherice Jamil    Referring Clinic: [facility] - [facility]    Reason for Visit/Diagnosis: Right Forearm Nodule       FUTURE VISIT INFORMATION:    Appointment Date: 5/12/2023    Appointment Time: 9:15 AM     NOTES RECORD STATUS  DETAILS   OFFICE NOTE from Referring Provider Internal [facility]:  1/11/23, 9/7/22 - [facility] OV with Dr. Jamil   OFFICE NOTE from Other Specialists Internal [facility]:  2/28/23 - OT OV with Billie Paniagua OT    [facility]:  1/18/23 - SPORT OV with Dr. Carpio   [facility] DISCHARGE SUMMARY/ ED VISITS  N/A    OPERATIVE REPORT N/A    ENDOSCOPY (EGD)  N/A    PERTINENT LABS Care Everywhere / Internal    PATHOLOGY REPORTS (RELATED) N/A    IMAGING (CT, MRI, US, XR)  Internal [facility]:  1/18/23 - XR Right Wrist

## 2023-03-10 NOTE — PROGRESS NOTES
SOAP Note Objective Information for 3/14/2023.  Please refer to the daily flowsheet for treatment today, total treatment time and time spent performing 1:1 timed codes.       Pain Level (Scale 0-10):   2/6/23 2/13/23 2/20/23 2/28/23 3/14/23   At Rest 0 0 0 0 0   With Use 7 7-8 2-3 2 1-2     Pain Description:  Date 2/6/2023 3/14/2023   Location wrist and thumb Radial wrist/thumb   Pain Quality Sharp, Shooting and Throbbing sore   Frequency intermittent   intermittent   Pain is worst  daytime daytime   Exacerbated by  use Wide grasp on something   Relieved by Voltarin    Progression unchanged improving     Palpation forearm  Pain Report:  - none    + mild    ++ moderate    +++ severe    2/6/2023 2/13/2023 2/20/2023   Radial Styloid + -    1st DC ++ + +   FCR ++ ++ ++   Thumb CMC + + tender   PIN Site -     Extensor Wad ++ ++ +   web ++ + +     Home Exercise Program:  Self Elbow Mobilization, Trigger Point Massage Over Radial Head - Sessions: 1x every other day, Notes:  with hand or use a golf ball in top part of forearm wad and on bottom forearm wad  [place] Massage to Thumb - Sessions: 1, Notes: Use a golf ball. Hold on tender spot for 30-60 seconds.    Thumb Stabilization Web Space Release Method 1 with Clip - Sessions: 1, Notes: 2-3 minutes  Ball Massage to Flexors, Sets: 1 - Sessions: 1x every other day, Notes: Roll up ball along underside of forearm  Cold pack 2-3 x/day, 5-10 min  DANTE thumb spica     2/20/2023  Double pencil massage  Wrist extensors/flexors stretch    3/14/23  EPB AROM    Next Visit:  Check response to EPB AROM  US or laser  MFR  Nerve gliding  K-tape

## 2023-03-14 ENCOUNTER — THERAPY VISIT (OUTPATIENT)
Dept: OCCUPATIONAL THERAPY | Facility: CLINIC | Age: 63
End: 2023-03-14
Payer: COMMERCIAL

## 2023-03-14 DIAGNOSIS — M65.4 TENDINITIS, DE QUERVAIN'S: Primary | ICD-10-CM

## 2023-03-14 PROCEDURE — 97140 MANUAL THERAPY 1/> REGIONS: CPT | Mod: GO | Performed by: OCCUPATIONAL THERAPIST

## 2023-03-14 PROCEDURE — 97110 THERAPEUTIC EXERCISES: CPT | Mod: GO | Performed by: OCCUPATIONAL THERAPIST

## 2023-03-14 PROCEDURE — 97035 APP MDLTY 1+ULTRASOUND EA 15: CPT | Mod: GO | Performed by: OCCUPATIONAL THERAPIST

## 2023-05-12 ENCOUNTER — PRE VISIT (OUTPATIENT)
Dept: SURGERY | Facility: CLINIC | Age: 63
End: 2023-05-12

## 2023-05-23 ENCOUNTER — ANCILLARY PROCEDURE (OUTPATIENT)
Dept: MAMMOGRAPHY | Facility: CLINIC | Age: 63
End: 2023-05-23
Attending: FAMILY MEDICINE
Payer: COMMERCIAL

## 2023-05-23 DIAGNOSIS — Z12.31 VISIT FOR SCREENING MAMMOGRAM: ICD-10-CM

## 2023-05-23 PROCEDURE — 77067 SCR MAMMO BI INCL CAD: CPT | Mod: TC | Performed by: RADIOLOGY

## 2023-05-30 ENCOUNTER — OFFICE VISIT (OUTPATIENT)
Dept: INTERNAL MEDICINE | Facility: CLINIC | Age: 63
End: 2023-05-30
Payer: COMMERCIAL

## 2023-05-30 VITALS
HEART RATE: 71 BPM | SYSTOLIC BLOOD PRESSURE: 119 MMHG | OXYGEN SATURATION: 99 % | BODY MASS INDEX: 23.51 KG/M2 | DIASTOLIC BLOOD PRESSURE: 77 MMHG | HEIGHT: 71 IN | WEIGHT: 167.9 LBS

## 2023-05-30 DIAGNOSIS — M54.2 NECK PAIN: ICD-10-CM

## 2023-05-30 DIAGNOSIS — G44.209 TENSION HEADACHE: Primary | ICD-10-CM

## 2023-05-30 PROCEDURE — 99213 OFFICE O/P EST LOW 20 MIN: CPT | Performed by: INTERNAL MEDICINE

## 2023-05-30 RX ORDER — FLUCONAZOLE 150 MG/1
150 TABLET ORAL WEEKLY
COMMUNITY
Start: 2023-04-29 | End: 2023-11-30

## 2023-05-30 RX ORDER — CETIRIZINE HYDROCHLORIDE 10 MG/1
1 TABLET ORAL DAILY PRN
COMMUNITY
Start: 2023-03-14 | End: 2023-11-30

## 2023-05-30 RX ORDER — GENTAMICIN SULFATE 1 MG/G
1 OINTMENT TOPICAL 2 TIMES DAILY
COMMUNITY
Start: 2022-03-04 | End: 2023-11-30

## 2023-05-30 NOTE — PROGRESS NOTES
Assessment & Plan     Tension headache  and  Neck pain    Casie Anne presents with a few months of tension type headache likely associated with neck pain/stiffness.  She was concerned about possible tumor given these headaches are new for her, but she has no red flag symptoms and her neuro exam is normal today, so reassured her that this seems unlikely.  She can continue current home treatments for the neck and add in PT.  Offered muscle relaxers, but she prefers to avoid these.  Follow-up as needed if symptoms are not improving in 1-2 months or any new/worsening symptoms develop and we could consider neuroimaging.     - Physical Therapy Referral; Future    Kalin Mitchell MD  [facility] INTERNAL MEDICINE [place]    Subjective   Casie Anne is a 62 year old, presenting for the following health issues:  Neck Pain (Pt here for neck pain and headache started early March this year.)    HPI     Casie Anne presents with about 3 months of posterior neck pain (worse in the R occipital area) associated with posterior headache that radiates up to the top of the head, once to the right eye.  She has had some fatigue, nausea but no vomiting.  No significant light or sound sensitivity.  She has left sided tinnitus that is constant, mild symptoms on the right- does seem a little worse recently.  No new hearing loss.  No vision changes.  No new weakness.  She is on gabapentin for limb tingling, not worse recently. She will wake with the headaches.  Has neck stiffness.  Occasionally has a mild sense of moving when she is still, not a full room spinning sensation.     She had gone cross country skiing and was afterward playing the piano vigorously the day the pain started.    Activity worsens the pain.  Pain was improving until last week when lifting weights.  She notes a chipped crown that is altering her bite and wonders if this is contributing to symptoms as well, she is going to have this replaced.  Ice helps the  "most.  She has also used heat, massage, Advil, and Tylenol and these also help.    Casie Anne would like to rule out brain tumor as a possible etiology of symptoms.       *    Neck x-ray from 2014:  Normal alignment through T1. Degenerative narrowing of the  C5-C6 and C6-C7 levels with mild associated hypertrophic changes. Mild  uncinate hypertrophy in the mid cervical spine. No fractures  Identified.    She received a PT referral for neck pain in 2014, but she doesn't recall this episode.         Review of Systems   Constitutional, MSK, neuro systems are negative, except as otherwise noted.      Objective    /77 (BP Location: Right arm, Patient Position: Sitting, Cuff Size: Adult Regular)   Pulse 71   Ht 1.803 m (5' 10.98\")   Wt 76.2 kg (167 lb 14.4 oz)   LMP 03/25/2012   SpO2 99%   BMI 23.43 kg/m    Body mass index is 23.43 kg/m .  Physical Exam     GENERAL: healthy, alert and no distress  RESP: lungs clear to auscultation - no rales, rhonchi or wheezes  CV: regular rate and rhythm, normal S1 S2, no S3 or S4, no murmur, click or rub, no peripheral edema  MSK: no pain to palpation of the neck but ROM is reduced in all directions  NEURO: Cranial nerves intact, normal strength and tone, sensory exam grossly normal, mentation intact, DTR's normal and symmetric at patellas, gait normal including heel/toe/tandem walking and Romberg normal, normal finger to nose and heel to shin tests                "

## 2023-05-30 NOTE — NURSING NOTE
"Casie Harris is a 62 year old female patient that presents today in clinic for the following:    Chief Complaint   Patient presents with     Neck Pain     Pt here for neck pain and headache started early March this year.     The patient's allergies and medications were reviewed as noted. A set of vitals were recorded as noted without incident: /77 (BP Location: Right arm, Patient Position: Sitting, Cuff Size: Adult Regular)   Pulse 71   Ht 1.803 m (5' 10.98\")   Wt 76.2 kg (167 lb 14.4 oz)   LMP 03/25/2012   SpO2 99%   BMI 23.43 kg/m  . The patient does not have any other questions for the provider.    Merissa Abel, EMT at 3:51 PM on 5/30/2023  "

## 2023-06-13 ENCOUNTER — THERAPY VISIT (OUTPATIENT)
Dept: PHYSICAL THERAPY | Facility: CLINIC | Age: 63
End: 2023-06-13
Attending: INTERNAL MEDICINE
Payer: COMMERCIAL

## 2023-06-13 DIAGNOSIS — M54.2 NECK PAIN: ICD-10-CM

## 2023-06-13 PROCEDURE — 97140 MANUAL THERAPY 1/> REGIONS: CPT | Mod: GP | Performed by: PHYSICAL THERAPIST

## 2023-06-13 PROCEDURE — 97161 PT EVAL LOW COMPLEX 20 MIN: CPT | Mod: GP | Performed by: PHYSICAL THERAPIST

## 2023-06-13 NOTE — PROGRESS NOTES
PHYSICAL THERAPY EVALUATION  Type of Visit: Evaluation    See electronic medical record for Abuse and Falls Screening details.    Subjective      Presenting condition or subjective complaint: Neck stiffness and pain w/headaches. Began in mid-March after a hard day of cross-country skiing and playing the piano forcefully with a nephew. Had gradual onset of neck stiffness in the days that followed. Went to the [facility] during April, had two massages which gave temporary relief (~2 weeks). Then, about two weeks ago, had a recurrence of her symptoms following lifting weights, which she had not done for quite some time. Again, neck stiffness, as well as pain in her R>L suboccipital region with associated headaches radiating in a jana's horn distribution bilaterally. She reports these headaches come and go, tend to last for a few hours, although most recent has been present a couple of days. Not debilitating, but new to her and initially were concerning for that reason. She did see her PCP who did a clinical neurological evaluation and did not believe any further neurologic imaging was indicated at this time.   She does feel her symptoms worsen if she is lying on her back and her head falls backwards slightly (into extension). She as avoided riding her upright bike with concern that it might trigger symptoms. She has been gardening and notices some stiffness, but does not limit her.   Has had one session of acupuncture, and not sure if it was helpful - but may give it more of an extended trial to assess whether it helps. Has done ice and heat, both help, has done heat more frequently. Tylenol has also helped, tried Advil and it didn't help much.   Date of onset: 03/15/23    Relevant medical history: Pain at night or rest   Dates & types of surgery: 2018: 1 ovary & both fallopian tubes removed    Prior diagnostic imaging/testing results:       Prior therapy history for the same diagnosis, illness or injury: Yes Maybe in  "2014?    Prior Level of Function   Transfers: Independent  Ambulation: Independent  ADL: Independent    Living Environment  Social support: With a significant other or spouse   Type of home: House; 2-story   Stairs to enter the home: Yes 12 Is there a railing: Yes   Ramp:     Stairs inside the home: Yes 12 Is there a railing: Yes   Help at home: None  Equipment owned:       Employment: Yes Writer  Hobbies/Interests: Biking, walking, gardening, kayaking, reading, cooking, skiing    Patient goals for therapy: Not have pain/headaches    Pain assessment: Pain present  Location: R side jana's horn headache distribution/Rating: \"low level\"     Objective   CERVICAL EXAMINATION    Posture: Forward head on neck, Increased thoracic kyphosis and Rounded shoulders (all mild)    Active ROM ROM   Flexion 25% loss   Extension 50% loss   Protraction na   Retraction 25% loss    L R   Rotation 25% loss 25% loss   Sidebend 75% loss 50% loss     Neurological testing (myotomes, sensation, reflexes, nerve tension) not indicated at this time.    Tenderness to palpation (with no change in headache) at bilateral suboccipital muscles.   Decreased cervical joint mobility mildly throughout, moderately O-C1, C1-C2, C2-C3.    Other:  Spurlings: n/a  ULTT: n/a  DNF endurance: 30\" achieved, with subjective report of significant effort required    Assessment & Plan   CLINICAL IMPRESSIONS   Medical Diagnosis: Neck pain with headaches    Treatment Diagnosis: Neck pain w/headaches   Impression/Assessment: Patient is a 62 year old female with neck pain and headache complaints.  The following significant findings have been identified: Pain, Decreased ROM/flexibility, Decreased joint mobility, Decreased strength, Impaired muscle performance, Decreased activity tolerance and Impaired posture. These impairments interfere with their ability to perform work tasks, recreational activities and household chores as compared to previous level of function. "     Clinical Decision Making (Complexity):   Clinical Presentation: Stable/Uncomplicated  Clinical Presentation Rationale: based on medical and personal factors listed in PT evaluation  Clinical Decision Making (Complexity): Low complexity    PLAN OF CARE  Treatment Interventions:  Modalities: Cryotherapy, Dry Needling, E-stim, Hot Pack, Ultrasound  Interventions: Manual Therapy, Neuromuscular Re-education, Therapeutic Activity, Therapeutic Exercise, Self-Care/Home Management    Long Term Goals     PT Goal 1  Goal Identifier: Headaches  Goal Description: Reduce frequency of headaches to 0x/week in order to promote independence in self-care and household chores.  Target Date: 07/25/23      Frequency of Treatment: 1x/week  Duration of Treatment: 6 weeks    Recommended Referrals to Other Professionals: none  Education Assessment:   Learner/Method: Patient  Education Comments: Pt demonstrated understanding throughout session.    Risks and benefits of evaluation/treatment have been explained.   Patient/Family/caregiver agrees with Plan of Care.     Evaluation Time:     PT Eval, Low Complexity Minutes (28372): 20   Present: No: not needed    Signing Clinician: Tyrel Hurtado, PT

## 2023-06-20 ENCOUNTER — THERAPY VISIT (OUTPATIENT)
Dept: PHYSICAL THERAPY | Facility: CLINIC | Age: 63
End: 2023-06-20
Attending: INTERNAL MEDICINE
Payer: COMMERCIAL

## 2023-06-20 DIAGNOSIS — M54.2 NECK PAIN: Primary | ICD-10-CM

## 2023-06-20 PROCEDURE — 97112 NEUROMUSCULAR REEDUCATION: CPT | Mod: GP | Performed by: PHYSICAL THERAPIST

## 2023-06-20 PROCEDURE — 97140 MANUAL THERAPY 1/> REGIONS: CPT | Mod: GP | Performed by: PHYSICAL THERAPIST

## 2023-06-27 ENCOUNTER — THERAPY VISIT (OUTPATIENT)
Dept: PHYSICAL THERAPY | Facility: CLINIC | Age: 63
End: 2023-06-27
Attending: INTERNAL MEDICINE
Payer: COMMERCIAL

## 2023-06-27 DIAGNOSIS — M54.2 NECK PAIN: Primary | ICD-10-CM

## 2023-06-27 PROCEDURE — 97140 MANUAL THERAPY 1/> REGIONS: CPT | Mod: GP | Performed by: PHYSICAL THERAPIST

## 2023-06-27 PROCEDURE — 97110 THERAPEUTIC EXERCISES: CPT | Mod: GP | Performed by: PHYSICAL THERAPIST

## 2023-06-27 PROCEDURE — 97112 NEUROMUSCULAR REEDUCATION: CPT | Mod: GP | Performed by: PHYSICAL THERAPIST

## 2023-06-30 NOTE — TELEPHONE ENCOUNTER
REFERRAL INFORMATION:    Referring Provider: Dr. Sherice Jamil    Referring Clinic: [facility] - [facility]    Reason for Visit/Diagnosis: Right Forearm Nodule       FUTURE VISIT INFORMATION:    Appointment Date: 7/12/2023    Appointment Time: 10:30 AM     NOTES RECORD STATUS  DETAILS   OFFICE NOTE from Referring Provider Internal [facility]:  1/11/23, 9/7/22 - [facility] OV with Dr. Jamil   OFFICE NOTE from Other Specialists Internal [facility] [place]:  2/28/23 - OT OV with Billie Paniagua OT     [facility]:  1/18/23 - SPORT OV with Dr. Carpio   [facility] DISCHARGE SUMMARY/ ED VISITS  N/A    OPERATIVE REPORT N/A    PERTINENT LABS Care Everywhere / Internal    IMAGING (CT, MRI, US, XR)  Internal [facility]:  1/18/23 - XR R Wrist  8/19/19 - XR L Wrist

## 2023-07-12 ENCOUNTER — PRE VISIT (OUTPATIENT)
Dept: SURGERY | Facility: CLINIC | Age: 63
End: 2023-07-12

## 2023-08-01 ENCOUNTER — THERAPY VISIT (OUTPATIENT)
Dept: PHYSICAL THERAPY | Facility: CLINIC | Age: 63
End: 2023-08-01
Payer: COMMERCIAL

## 2023-08-01 DIAGNOSIS — M54.2 NECK PAIN: Primary | ICD-10-CM

## 2023-08-01 PROCEDURE — 97110 THERAPEUTIC EXERCISES: CPT | Mod: GP | Performed by: PHYSICAL THERAPIST

## 2023-08-01 PROCEDURE — 97140 MANUAL THERAPY 1/> REGIONS: CPT | Mod: GP | Performed by: PHYSICAL THERAPIST

## 2023-08-01 ASSESSMENT — ENCOUNTER SYMPTOMS
NUMBNESS: 0
MEMORY LOSS: 0
NECK MASS: 0
BLOOD IN STOOL: 0
TROUBLE SWALLOWING: 0
NAUSEA: 0
STIFFNESS: 1
TASTE DISTURBANCE: 0
VOMITING: 0
PARALYSIS: 0
MUSCLE CRAMPS: 0
JOINT SWELLING: 0
HEADACHES: 1
DISTURBANCES IN COORDINATION: 0
BLOATING: 1
MUSCLE WEAKNESS: 0
SPEECH CHANGE: 0
SORE THROAT: 0
BACK PAIN: 1
SINUS CONGESTION: 0
DIARRHEA: 0
DECREASED LIBIDO: 1
HEARTBURN: 0
CONSTIPATION: 0
ARTHRALGIAS: 0
TREMORS: 0
BOWEL INCONTINENCE: 0
MYALGIAS: 1
SINUS PAIN: 0
DIZZINESS: 0
JAUNDICE: 0
WEAKNESS: 0
LOSS OF CONSCIOUSNESS: 0
TINGLING: 1
HOT FLASHES: 0
RECTAL PAIN: 1
NECK PAIN: 1
SEIZURES: 0
SMELL DISTURBANCE: 0
HOARSE VOICE: 0
ABDOMINAL PAIN: 0

## 2023-08-01 NOTE — PROGRESS NOTES
Assessment & Plan     Anxiety  She uses this sparingly and mainly for flying but also sometimes when she is very anxious about the tingling sensation process  - clonazePAM (KLONOPIN) 0.5 MG ODT  Dispense: 10 tablet; Refill: 0    Tingling sensation  This has been worked up in the past with no clear etiology.  She is still obviously is having symptoms and it sounds like a neuropathy.  She is on some gabapentin but perhaps a different med would work better.  She was asking for several labs which we will go ahead and get and then I gave her a referral back to neurology.   - CBC with platelets and differential  - ESR: Erythrocyte sedimentation rate  - CRP, inflammation  - Adult Neurology  Referral  - Comprehensive metabolic panel  - Vitamin D Deficiency    Screen for colon cancer   She is due for a colonoscopy that was ordered  - Adult GI  Referral - Procedure Only    Menopause  She had a DEXA in 2017 we will follow-up on that  - DX Hip/Pelvis/Spine    Bee sting reaction, accidental or unintentional, subsequent encounter  She had a reaction to some bee stings and wanted an EpiPen  - EPINEPHrine (EPIPEN 2-RD) 0.3 MG/0.3ML injection 2-pack  Dispense: 2 each; Refill: 1    Lichen sclerosus  She has reported plical lichen sclerosis by OB/GYN and Derm feels its not lichen sclerosis.  She is following the treatment by Derm.  She has not had a biopsy to show if this is lichen sclerosis, and and will let Derm and gyn monitor this.    Return if symptoms worsen or fail to improve.    Sherice Jamil MD PhD  [facility] PRIMARY CARE [facility]    Time note (e5, 40'): The total of my time (on the date of service) for this service was 69 minutes, including discussion/face-to-face, chart review, interpretation not otherwise reported, documentation, and updating of the computerized record.    Subjective   Casie Anne is a 62 year old, presenting for the following health issues:  RECHECK (Worsening  tingling all over body./Labs.)    HPI     Having low grade tingling with intermittent flares.  This is long standing. Had neurological work up in the past and was negative.  Feels it everywhere.  Taking gabapentin 600mg/day and benadryl at night. Slight weakness in left leg, no HA, no visual problems, slight numbness, has prickly feeling everywhere.       Seems to have symptoms  in the genital area. Has been told she has lichen sclerosis by gyn but derm isn't convinced.  Not using the triamcinolone cream.  No biopsy.  Area burns and itches.  When gets symptoms seems to cause more body tingling.  Derm recommends no steroid- thinks it is atopic dermatitis - told to use eucrista (ointment)  and gentamicin and diflucan 1x/wk  doing this 1 month -unclear if helping - it burns, itches, aches.      Wants blood work  - older sister diagnosed with  CLL and SLL - runs in Family     Had recent bee stings  - 3 stings - did swell up and painful - no breathing problems     Hx of anxiety - needs refill of klonopin for flying and if gets very anxious.  Using lexapro -no counseling -      Gyn - had a physical with Dr Vela 2023.   She is Gr0  PM   Hormone Use:  none currently, has taken in the past  Lichen sclerosus 2016  - recent exam showed (2023) showed thickened epithelium and white pigmentation above the clitoris - using triamcinolone cream      History of Pap Smear:  no previous abnormalities last one 2022  due 2025  Family History of Uterine or Ovarian Cancer:  No  History of Mammogram:  she has no history of abnormal mammograms - 2023  History of Abnormal Lipids:  Yes - borderline  Last colonoscopy 2014  needs to schedule had polyps in    Last tetanus booster   Last DEXA scan 2017  COVID-19 vaccine:   Shingles vaccine:   Last Lipids:  Chol:  235    2022  T    2022  HDL:     81    2022  LDL:   135    2022    Review of Systems   Constitutional, HEENT, cardiovascular,  "pulmonary, GI, , musculoskeletal, neuro, skin, endocrine and psych systems are negative, except as otherwise noted.      Objective    /75 (BP Location: Right arm, Patient Position: Sitting, Cuff Size: Adult Regular)   Pulse 68   Ht 1.81 m (5' 11.26\")   Wt 74.7 kg (164 lb 11.2 oz)   LMP 03/25/2012   SpO2 97%   BMI 22.80 kg/m    Body mass index is 22.8 kg/m .  Physical Exam   GENERAL: anxious and  alert  NECK: no adenopathy, no asymmetry, masses, or scars and thyroid normal to palpation  RESP: lungs clear to auscultation - no rales, rhonchi or wheezes  CV: regular rate and rhythm, normal S1 S2, no S3 or S4, no murmur, click or rub, no peripheral edema  MS: no gross musculoskeletal defects noted, no edema  NEURO: Cranial nerves intact.  Motor strength 5/5 and equal in upper and lower extremities.  Reflexes 2/4 and equal bilaterally in upper and lower extremities.  Mentation intact although appears anxious.  Subjective tingling in arms and legs.  PSYCH: mentation appears normal, affect normal/bright  LYMPH: no cervical, supraclavicular, adenopathy    Sherice Jamil MD, PhD                    "

## 2023-08-02 ENCOUNTER — OFFICE VISIT (OUTPATIENT)
Dept: FAMILY MEDICINE | Facility: CLINIC | Age: 63
End: 2023-08-02
Payer: COMMERCIAL

## 2023-08-02 ENCOUNTER — LAB (OUTPATIENT)
Dept: LAB | Facility: CLINIC | Age: 63
End: 2023-08-02
Payer: COMMERCIAL

## 2023-08-02 VITALS
HEART RATE: 68 BPM | BODY MASS INDEX: 23.06 KG/M2 | HEIGHT: 71 IN | DIASTOLIC BLOOD PRESSURE: 75 MMHG | SYSTOLIC BLOOD PRESSURE: 114 MMHG | WEIGHT: 164.7 LBS | OXYGEN SATURATION: 97 %

## 2023-08-02 DIAGNOSIS — Z12.11 SCREEN FOR COLON CANCER: ICD-10-CM

## 2023-08-02 DIAGNOSIS — F41.9 ANXIETY: ICD-10-CM

## 2023-08-02 DIAGNOSIS — Z78.0 MENOPAUSE: ICD-10-CM

## 2023-08-02 DIAGNOSIS — R20.2 TINGLING SENSATION: Primary | ICD-10-CM

## 2023-08-02 DIAGNOSIS — R20.2 TINGLING SENSATION: ICD-10-CM

## 2023-08-02 DIAGNOSIS — T63.441D BEE STING REACTION, ACCIDENTAL OR UNINTENTIONAL, SUBSEQUENT ENCOUNTER: ICD-10-CM

## 2023-08-02 DIAGNOSIS — L90.0 LICHEN SCLEROSUS: ICD-10-CM

## 2023-08-02 LAB
ALBUMIN SERPL BCG-MCNC: 4.6 G/DL (ref 3.5–5.2)
ALP SERPL-CCNC: 68 U/L (ref 35–104)
ALT SERPL W P-5'-P-CCNC: 19 U/L (ref 0–50)
ANION GAP SERPL CALCULATED.3IONS-SCNC: 9 MMOL/L (ref 7–15)
AST SERPL W P-5'-P-CCNC: 19 U/L (ref 0–45)
BASOPHILS # BLD AUTO: 0 10E3/UL (ref 0–0.2)
BASOPHILS NFR BLD AUTO: 1 %
BILIRUB SERPL-MCNC: 0.3 MG/DL
BUN SERPL-MCNC: 14 MG/DL (ref 8–23)
CALCIUM SERPL-MCNC: 9.4 MG/DL (ref 8.8–10.2)
CHLORIDE SERPL-SCNC: 102 MMOL/L (ref 98–107)
CREAT SERPL-MCNC: 0.85 MG/DL (ref 0.51–0.95)
CRP SERPL-MCNC: <3 MG/L
DEPRECATED CALCIDIOL+CALCIFEROL SERPL-MC: 43 UG/L (ref 20–75)
DEPRECATED HCO3 PLAS-SCNC: 28 MMOL/L (ref 22–29)
EOSINOPHIL # BLD AUTO: 0.1 10E3/UL (ref 0–0.7)
EOSINOPHIL NFR BLD AUTO: 1 %
ERYTHROCYTE [DISTWIDTH] IN BLOOD BY AUTOMATED COUNT: 13.1 % (ref 10–15)
ERYTHROCYTE [SEDIMENTATION RATE] IN BLOOD BY WESTERGREN METHOD: 2 MM/HR (ref 0–30)
GFR SERPL CREATININE-BSD FRML MDRD: 77 ML/MIN/1.73M2
GLUCOSE SERPL-MCNC: 100 MG/DL (ref 70–99)
HCT VFR BLD AUTO: 40.3 % (ref 35–47)
HGB BLD-MCNC: 13.1 G/DL (ref 11.7–15.7)
IMM GRANULOCYTES # BLD: 0 10E3/UL
IMM GRANULOCYTES NFR BLD: 0 %
LYMPHOCYTES # BLD AUTO: 2.1 10E3/UL (ref 0.8–5.3)
LYMPHOCYTES NFR BLD AUTO: 37 %
MCH RBC QN AUTO: 30.5 PG (ref 26.5–33)
MCHC RBC AUTO-ENTMCNC: 32.5 G/DL (ref 31.5–36.5)
MCV RBC AUTO: 94 FL (ref 78–100)
MONOCYTES # BLD AUTO: 0.3 10E3/UL (ref 0–1.3)
MONOCYTES NFR BLD AUTO: 5 %
NEUTROPHILS # BLD AUTO: 3.3 10E3/UL (ref 1.6–8.3)
NEUTROPHILS NFR BLD AUTO: 56 %
NRBC # BLD AUTO: 0 10E3/UL
NRBC BLD AUTO-RTO: 0 /100
PLATELET # BLD AUTO: 236 10E3/UL (ref 150–450)
POTASSIUM SERPL-SCNC: 4.5 MMOL/L (ref 3.4–5.3)
PROT SERPL-MCNC: 6.9 G/DL (ref 6.4–8.3)
RBC # BLD AUTO: 4.29 10E6/UL (ref 3.8–5.2)
SODIUM SERPL-SCNC: 139 MMOL/L (ref 136–145)
WBC # BLD AUTO: 5.8 10E3/UL (ref 4–11)

## 2023-08-02 PROCEDURE — 99000 SPECIMEN HANDLING OFFICE-LAB: CPT | Performed by: PATHOLOGY

## 2023-08-02 PROCEDURE — 80053 COMPREHEN METABOLIC PANEL: CPT | Performed by: PATHOLOGY

## 2023-08-02 PROCEDURE — 85025 COMPLETE CBC W/AUTO DIFF WBC: CPT | Performed by: PATHOLOGY

## 2023-08-02 PROCEDURE — 99215 OFFICE O/P EST HI 40 MIN: CPT | Performed by: FAMILY MEDICINE

## 2023-08-02 PROCEDURE — 99417 PROLNG OP E/M EACH 15 MIN: CPT | Performed by: FAMILY MEDICINE

## 2023-08-02 PROCEDURE — 36415 COLL VENOUS BLD VENIPUNCTURE: CPT | Performed by: PATHOLOGY

## 2023-08-02 PROCEDURE — 86140 C-REACTIVE PROTEIN: CPT | Performed by: PATHOLOGY

## 2023-08-02 PROCEDURE — 82306 VITAMIN D 25 HYDROXY: CPT | Performed by: FAMILY MEDICINE

## 2023-08-02 PROCEDURE — 85652 RBC SED RATE AUTOMATED: CPT | Performed by: PATHOLOGY

## 2023-08-02 RX ORDER — EPINEPHRINE 0.3 MG/.3ML
0.3 INJECTION SUBCUTANEOUS PRN
Qty: 2 EACH | Refills: 1 | Status: SHIPPED | OUTPATIENT
Start: 2023-08-02 | End: 2023-11-30

## 2023-08-02 RX ORDER — CLONAZEPAM 0.5 MG/1
0.5 TABLET, ORALLY DISINTEGRATING ORAL 2 TIMES DAILY PRN
Qty: 10 TABLET | Refills: 0 | Status: SHIPPED | OUTPATIENT
Start: 2023-08-02 | End: 2024-08-07

## 2023-08-02 ASSESSMENT — ANXIETY QUESTIONNAIRES
GAD7 TOTAL SCORE: 0
2. NOT BEING ABLE TO STOP OR CONTROL WORRYING: NOT AT ALL
3. WORRYING TOO MUCH ABOUT DIFFERENT THINGS: NOT AT ALL
6. BECOMING EASILY ANNOYED OR IRRITABLE: NOT AT ALL
7. FEELING AFRAID AS IF SOMETHING AWFUL MIGHT HAPPEN: NOT AT ALL
1. FEELING NERVOUS, ANXIOUS, OR ON EDGE: NOT AT ALL
GAD7 TOTAL SCORE: 0
5. BEING SO RESTLESS THAT IT IS HARD TO SIT STILL: NOT AT ALL

## 2023-08-02 ASSESSMENT — PATIENT HEALTH QUESTIONNAIRE - PHQ9
5. POOR APPETITE OR OVEREATING: NOT AT ALL
SUM OF ALL RESPONSES TO PHQ QUESTIONS 1-9: 0

## 2023-08-02 NOTE — PATIENT INSTRUCTIONS
Blood work today  See [facility] clinic for the tingling  Find about the genetic testing of drugs   Epipen script sent  Get colonoscopy  Get bone density

## 2023-08-02 NOTE — NURSING NOTE
"Casie Harris is a 62 year old female patient that presents today in clinic for the following:    Chief Complaint   Patient presents with    RECHECK     Worsening tingling all over body.  Labs.     The patient's allergies and medications were reviewed as noted. A set of vitals were recorded as noted without incident: /75 (BP Location: Right arm, Patient Position: Sitting, Cuff Size: Adult Regular)   Pulse 68   Ht 1.81 m (5' 11.26\")   Wt 74.7 kg (164 lb 11.2 oz)   LMP 03/25/2012   SpO2 97%   BMI 22.80 kg/m  . The patient does not have any other questions for the provider.    Alley Murry, EMT at 11:05 AM on 8/2/2023.  Primary care clinic: [phone]    "

## 2023-08-07 ENCOUNTER — MYC MEDICAL ADVICE (OUTPATIENT)
Dept: FAMILY MEDICINE | Facility: CLINIC | Age: 63
End: 2023-08-07
Payer: COMMERCIAL

## 2023-08-09 NOTE — TELEPHONE ENCOUNTER
I called Casie Anne and reached voicemail. Message left recommending patient either schedule an appointment or start an eVisit. Evaluation recommended per covering provider.    Antoinette ([facility]) JEN Wilder

## 2023-10-21 DIAGNOSIS — F41.1 GAD (GENERALIZED ANXIETY DISORDER): ICD-10-CM

## 2023-10-24 ENCOUNTER — MYC MEDICAL ADVICE (OUTPATIENT)
Dept: FAMILY MEDICINE | Facility: CLINIC | Age: 63
End: 2023-10-24
Payer: COMMERCIAL

## 2023-10-25 RX ORDER — ESCITALOPRAM OXALATE 5 MG/1
5 TABLET ORAL DAILY
Qty: 90 TABLET | Refills: 2 | Status: SHIPPED | OUTPATIENT
Start: 2023-10-25

## 2023-10-25 NOTE — TELEPHONE ENCOUNTER
escitalopram (LEXAPRO) 5 MG tablet 90 tablet 3 10/25/2022  Last Office Visit : 8/2/2023   Future Office visit:  0    Routing refill request to provider for review/approval because:

## 2023-11-16 ENCOUNTER — TRANSFERRED RECORDS (OUTPATIENT)
Dept: HEALTH INFORMATION MANAGEMENT | Facility: CLINIC | Age: 63
End: 2023-11-16
Payer: COMMERCIAL

## 2023-11-30 ENCOUNTER — ANCILLARY PROCEDURE (OUTPATIENT)
Dept: GENERAL RADIOLOGY | Facility: CLINIC | Age: 63
End: 2023-11-30
Attending: FAMILY MEDICINE
Payer: COMMERCIAL

## 2023-11-30 ENCOUNTER — OFFICE VISIT (OUTPATIENT)
Dept: FAMILY MEDICINE | Facility: CLINIC | Age: 63
End: 2023-11-30
Payer: COMMERCIAL

## 2023-11-30 VITALS
BODY MASS INDEX: 23.66 KG/M2 | WEIGHT: 169 LBS | SYSTOLIC BLOOD PRESSURE: 103 MMHG | TEMPERATURE: 97 F | HEART RATE: 63 BPM | DIASTOLIC BLOOD PRESSURE: 70 MMHG | OXYGEN SATURATION: 98 % | HEIGHT: 71 IN | RESPIRATION RATE: 16 BRPM

## 2023-11-30 DIAGNOSIS — R20.0 NUMBNESS OF TOES: Primary | ICD-10-CM

## 2023-11-30 DIAGNOSIS — R20.2 TINGLING SENSATION: ICD-10-CM

## 2023-11-30 DIAGNOSIS — F41.9 ANXIETY: ICD-10-CM

## 2023-11-30 DIAGNOSIS — R20.0 NUMBNESS OF TOES: ICD-10-CM

## 2023-11-30 DIAGNOSIS — S89.92XA INJURY OF LOWER EXTREMITY, LEFT, INITIAL ENCOUNTER: ICD-10-CM

## 2023-11-30 LAB
ANION GAP SERPL CALCULATED.3IONS-SCNC: 8 MMOL/L (ref 7–15)
BUN SERPL-MCNC: 9.7 MG/DL (ref 8–23)
CALCIUM SERPL-MCNC: 9.3 MG/DL (ref 8.8–10.2)
CHLORIDE SERPL-SCNC: 104 MMOL/L (ref 98–107)
CREAT SERPL-MCNC: 0.74 MG/DL (ref 0.51–0.95)
DEPRECATED HCO3 PLAS-SCNC: 27 MMOL/L (ref 22–29)
EGFRCR SERPLBLD CKD-EPI 2021: 90 ML/MIN/1.73M2
ERYTHROCYTE [DISTWIDTH] IN BLOOD BY AUTOMATED COUNT: 13 % (ref 10–15)
GLUCOSE SERPL-MCNC: 96 MG/DL (ref 70–99)
HCT VFR BLD AUTO: 41.6 % (ref 35–47)
HGB BLD-MCNC: 13.1 G/DL (ref 11.7–15.7)
MCH RBC QN AUTO: 30.8 PG (ref 26.5–33)
MCHC RBC AUTO-ENTMCNC: 31.5 G/DL (ref 31.5–36.5)
MCV RBC AUTO: 98 FL (ref 78–100)
PLATELET # BLD AUTO: 244 10E3/UL (ref 150–450)
POTASSIUM SERPL-SCNC: 4.8 MMOL/L (ref 3.4–5.3)
RBC # BLD AUTO: 4.25 10E6/UL (ref 3.8–5.2)
SODIUM SERPL-SCNC: 139 MMOL/L (ref 135–145)
VIT B12 SERPL-MCNC: 614 PG/ML (ref 232–1245)
WBC # BLD AUTO: 4.9 10E3/UL (ref 4–11)

## 2023-11-30 PROCEDURE — 73590 X-RAY EXAM OF LOWER LEG: CPT | Mod: TC | Performed by: RADIOLOGY

## 2023-11-30 PROCEDURE — 36415 COLL VENOUS BLD VENIPUNCTURE: CPT | Performed by: FAMILY MEDICINE

## 2023-11-30 PROCEDURE — 73630 X-RAY EXAM OF FOOT: CPT | Mod: TC | Performed by: RADIOLOGY

## 2023-11-30 PROCEDURE — 99215 OFFICE O/P EST HI 40 MIN: CPT | Performed by: FAMILY MEDICINE

## 2023-11-30 PROCEDURE — 99207 PR FOOT EXAM NO CHARGE: CPT | Performed by: FAMILY MEDICINE

## 2023-11-30 PROCEDURE — 80048 BASIC METABOLIC PNL TOTAL CA: CPT | Performed by: FAMILY MEDICINE

## 2023-11-30 PROCEDURE — 85027 COMPLETE CBC AUTOMATED: CPT | Performed by: FAMILY MEDICINE

## 2023-11-30 PROCEDURE — 82607 VITAMIN B-12: CPT | Performed by: FAMILY MEDICINE

## 2023-11-30 ASSESSMENT — ENCOUNTER SYMPTOMS: NUMBNESS: 1

## 2023-11-30 ASSESSMENT — PAIN SCALES - GENERAL: PAINLEVEL: NO PAIN (0)

## 2023-11-30 NOTE — PROGRESS NOTES
Assessment & Plan     Numbness of toes  The underlying cause of these symptoms is not entirely clear, but seems to be related to the recent injury she had to her left shin 4 months ago.  It is possible that this injury, which caused some swelling in the left leg led to some irritation and some of the cutaneous nerves, specifically the intermediate dorsal cutaneous nerve and its distal branches that are affecting the left lateral 3 toes.  It is reassuring that there is no loss of function or significant neurological deficits on exam.  I recommended checking labs as listed below.  These results have been normal in the past though.  We also decided to check x-rays of the left foot and leg.  I personally reviewed the images with her and provided an initial dictation.  No acute fractures or dislocations seen.  There is some mild degenerative changes in the toes.  I put in a referral for her to see podiatry for further evaluation.  She also has an appointment coming up to see neurology in February.  For now, she will continue gabapentin 300 mg twice daily.  - CBC with platelets; Future  - Basic metabolic panel  (Ca, Cl, CO2, Creat, Gluc, K, Na, BUN); Future  - Vitamin B12; Future  - XR Foot Left G/E 3 Views; Future  - Orthopedic  Referral; Future  - CBC with platelets  - Basic metabolic panel  (Ca, Cl, CO2, Creat, Gluc, K, Na, BUN)  - Vitamin B12    Injury of lower extremity, left, initial encounter  We checked x-rays of the left leg which showed no acute fractures or dislocations.  The formal radiology report is pending.  This injury seems to have healed up well.  There is no evidence of DVT on exam.  - XR Tibia and Fibula Left 2 Views; Future    Tingling sensation  She describes having a long history of generalized tingling sensations throughout her body.  She reports that no specific underlying cause has been discovered, although anxiety may be a contributing factor.  She has been following with neurology  and has an appointment with them coming up in February.    Anxiety  I recommended she continue Lexapro and she is also on gabapentin.  I explained that there is room to adjust dosing up on things if needed.  She is going to think about it for now.      45 minutes spent by me on the date of the encounter doing chart review, review of test results, interpretation of tests, patient visit, and documentation            Gustavo Ornelas DO  [facility]   Casie Anne is a 63 year old, presenting for the following health issues:  Numbness (Left toes for 2 months) and Pain (Left calf pain)      11/30/2023     7:19 AM   Additional Questions   Roomed by surekha hernandez       Numbness  Associated symptoms include numbness.   Pain  Associated symptoms include numbness.   History of Present Illness       Reason for visit:  Ongoing calf pain, toe numbness left leg/foot    She eats 4 or more servings of fruits and vegetables daily.She consumes 0 sweetened beverage(s) daily.She exercises with enough effort to increase her heart rate 30 to 60 minutes per day.  She exercises with enough effort to increase her heart rate 7 days per week.   She is taking medications regularly.       She describes having numbness symptoms in the left lateral 3 toes over the past couple of months.  Symptoms seem to have been triggered by an injury to her left shin that occurred 4 months ago.  She states that she may have bumped it on a table or something.  She describes a bruise in the left anterior-lateral shin which then developed into generalized swelling of the left lower leg.  The symptoms eventually improved with time and the leg is feeling back to normal.  However, over the past couple of months that she describes having numbness symptoms frequently in the left lateral 3 toes.  She notices this more at night when she is not distracted.  She denies any significant loss of function of the toes.  She denies any  "swelling or bruising within the left ankle, foot or toes.  She denies having history of any significant injuries to this area.  However, she remembers having an ankle sprain on the left side about 20 years ago.  On 8/2/2023 she had a normal CMP, CBC, ESR, CRP and vitamin D level.  She reports having a long history of generalized tingling symptoms throughout her body.  She has seen neurology about this and the work-up has been unremarkable.  She has been taking gabapentin 300 mg twice daily off-and-on for the symptoms.  She is not sure how helpful this medication is though.  She is also on Lexapro 5 mg daily to help with anxiety symptoms.              Review of Systems   Neurological:  Positive for numbness.      Constitutional, HEENT, cardiovascular, pulmonary, GI, , musculoskeletal, neuro, skin, endocrine and psych systems are negative, except as otherwise noted.      Objective    /70   Pulse 63   Temp 97  F (36.1  C) (Temporal)   Resp 16   Ht 1.803 m (5' 11\")   Wt 76.7 kg (169 lb)   LMP 03/25/2012   SpO2 98%   BMI 23.57 kg/m    Body mass index is 23.57 kg/m .  Physical Exam   GENERAL: healthy, alert and no distress  EYES: Eyes grossly normal to inspection, PERRL and conjunctivae and sclerae normal  NECK: no adenopathy, no asymmetry, masses, or scars and thyroid normal to palpation  RESP: lungs clear to auscultation - no rales, rhonchi or wheezes  CV: regular rate and rhythm, normal S1 S2, no S3 or S4, no murmur, click or rub, no peripheral edema and peripheral pulses strong  MS: no gross musculoskeletal defects noted, no edema.  Pedal pulses 2 out of 4 bilaterally.  Flexion and extension of the toes is normal bilaterally.  She ambulates without difficulty.  There is no bony tenderness in the left leg, ankle, foot or toes.  SKIN: no suspicious lesions or rashes.   NEURO: Normal strength and tone, mentation intact and speech normal.  Monofilament foot exam reveals slightly diminished sensation in the "  lateral left 3 toes  PSYCH: mentation appears normal, affect normal/bright

## 2023-12-01 DIAGNOSIS — R20.2 TINGLING IN EXTREMITIES: ICD-10-CM

## 2023-12-02 NOTE — TELEPHONE ENCOUNTER
gabapentin (NEURONTIN) 300 MG capsule 180 capsule 3 10/26/2022     Last Office Visit : 8/2/2023  [facility] Sherice Clement MD PhD     Future Office visit:  0    Routing refill request to provider for review/approval because:  Drug not on the [facility], P or [facility] refill protocol or controlled substance

## 2023-12-05 RX ORDER — GABAPENTIN 300 MG/1
300 CAPSULE ORAL 2 TIMES DAILY
Qty: 180 CAPSULE | Refills: 1 | Status: SHIPPED | OUTPATIENT
Start: 2023-12-05

## 2023-12-06 NOTE — TELEPHONE ENCOUNTER
DIAGNOSIS:  Numbness of toes [R20.0]  - Primary   APPOINTMENT DATE: 12/29/2023   NOTES STATUS DETAILS   OFFICE NOTE from referring provider Internal 11/30/2023 - Gustavo Stanley DO - [facility]   MEDICATION LIST Internal  Care Everywhere

## 2023-12-29 ENCOUNTER — PRE VISIT (OUTPATIENT)
Dept: ORTHOPEDICS | Facility: CLINIC | Age: 63
End: 2023-12-29

## 2024-01-16 ENCOUNTER — TRANSFERRED RECORDS (OUTPATIENT)
Dept: HEALTH INFORMATION MANAGEMENT | Facility: CLINIC | Age: 64
End: 2024-01-16

## 2024-02-01 ENCOUNTER — TRANSFERRED RECORDS (OUTPATIENT)
Dept: HEALTH INFORMATION MANAGEMENT | Facility: CLINIC | Age: 64
End: 2024-02-01

## 2024-02-08 ENCOUNTER — TRANSFERRED RECORDS (OUTPATIENT)
Dept: HEALTH INFORMATION MANAGEMENT | Facility: CLINIC | Age: 64
End: 2024-02-08

## 2024-02-15 ENCOUNTER — TRANSFERRED RECORDS (OUTPATIENT)
Dept: MULTI SPECIALTY CLINIC | Facility: CLINIC | Age: 64
End: 2024-02-15

## 2024-02-15 LAB — PAP SMEAR - HIM PATIENT REPORTED: NORMAL

## 2024-04-06 ENCOUNTER — HEALTH MAINTENANCE LETTER (OUTPATIENT)
Age: 64
End: 2024-04-06

## 2024-08-02 SDOH — HEALTH STABILITY: PHYSICAL HEALTH: ON AVERAGE, HOW MANY MINUTES DO YOU ENGAGE IN EXERCISE AT THIS LEVEL?: 30 MIN

## 2024-08-02 SDOH — HEALTH STABILITY: PHYSICAL HEALTH: ON AVERAGE, HOW MANY DAYS PER WEEK DO YOU ENGAGE IN MODERATE TO STRENUOUS EXERCISE (LIKE A BRISK WALK)?: 7 DAYS

## 2024-08-02 ASSESSMENT — SOCIAL DETERMINANTS OF HEALTH (SDOH): HOW OFTEN DO YOU GET TOGETHER WITH FRIENDS OR RELATIVES?: ONCE A WEEK

## 2024-08-06 NOTE — PROGRESS NOTES
Having low grade tingling with intermittent flares.  This is long standing. Had neurological work up in the past and was negative.  Feels it everywhere.  Taking gabapentin 600mg/day and benadryl at night. Slight weakness in left leg, no HA, no visual problems, slight numbness, has prickly feeling everywhere.   - saw someone at [facility] in 2024 - no new diagnosis - no tests ordered.       Seems to have symptoms  in the genital area. Has been told she has lichen sclerosis by gyn but derm isn't convinced.  Not using the triamcinolone cream.  No biopsy.  Area burns and itches.  When gets symptoms seems to cause more body tingling.  Derm recommends no steroid- thinks it is atopic dermatitis - told to use eucrista (ointment)  and gentamicin and diflucan 1x/wk  doing this 1 month -unclear if helping - it burns, itches, aches.       Wants blood work  - older sister diagnosed with  CLL and SLL - runs in Family      Had recent bee stings  - 3 stings - did swell up and painful - no breathing problems      Hx of anxiety - needs refill of klonopin for flying and if gets very anxious.  Using lexapro -no counseling -       Gyn - had a physical with Dr Vela 2023.   She is Gr0  PM   Hormone Use:  none currently, has taken in the past  Lichen sclerosus 2016  - recent exam showed (2023) showed thickened epithelium and white pigmentation above the clitoris - using triamcinolone cream      History of Pap Smear:  no previous abnormalities last one 2022  due 2025  Family History of Uterine or Ovarian Cancer:  No  History of Mammogram:  she has no history of abnormal mammograms - 2023  History of Abnormal Lipids:  Yes - borderline  Last colonoscopy 2014  needs to schedule had polyps in    Last tetanus booster 2017  Last DEXA scan 2017  COVID-19 vaccine:   Shingles vaccine:   Last Lipids:  Chol:  235    2022  T    2022  HDL:     81    2022  LDL  135  2022

## 2024-08-07 ENCOUNTER — OFFICE VISIT (OUTPATIENT)
Dept: FAMILY MEDICINE | Facility: CLINIC | Age: 64
End: 2024-08-07
Payer: COMMERCIAL

## 2024-08-07 VITALS
WEIGHT: 166.8 LBS | SYSTOLIC BLOOD PRESSURE: 117 MMHG | DIASTOLIC BLOOD PRESSURE: 75 MMHG | HEIGHT: 71 IN | HEART RATE: 72 BPM | BODY MASS INDEX: 23.35 KG/M2 | OXYGEN SATURATION: 98 %

## 2024-08-07 DIAGNOSIS — Z00.00 ANNUAL PHYSICAL EXAM: Primary | ICD-10-CM

## 2024-08-07 DIAGNOSIS — Z00.00 ROUTINE GENERAL MEDICAL EXAMINATION AT A HEALTH CARE FACILITY: ICD-10-CM

## 2024-08-07 DIAGNOSIS — L90.0 LICHEN SCLEROSUS: ICD-10-CM

## 2024-08-07 DIAGNOSIS — Z86.0100 HISTORY OF COLONIC POLYPS: ICD-10-CM

## 2024-08-07 DIAGNOSIS — R20.2 TINGLING IN EXTREMITIES: ICD-10-CM

## 2024-08-07 DIAGNOSIS — F41.1 GAD (GENERALIZED ANXIETY DISORDER): ICD-10-CM

## 2024-08-07 DIAGNOSIS — Z13.0 SCREENING FOR DEFICIENCY ANEMIA: ICD-10-CM

## 2024-08-07 DIAGNOSIS — Z13.29 SCREENING FOR THYROID DISORDER: ICD-10-CM

## 2024-08-07 DIAGNOSIS — E78.00 HYPERCHOLESTEROLEMIA: ICD-10-CM

## 2024-08-07 DIAGNOSIS — Z13.1 SCREENING FOR DIABETES MELLITUS: ICD-10-CM

## 2024-08-07 DIAGNOSIS — Z12.31 ENCOUNTER FOR SCREENING MAMMOGRAM FOR BREAST CANCER: ICD-10-CM

## 2024-08-07 DIAGNOSIS — F41.9 ANXIETY: ICD-10-CM

## 2024-08-07 PROCEDURE — 99396 PREV VISIT EST AGE 40-64: CPT | Performed by: FAMILY MEDICINE

## 2024-08-07 RX ORDER — CLONAZEPAM 0.5 MG/1
0.5 TABLET, ORALLY DISINTEGRATING ORAL 2 TIMES DAILY PRN
Qty: 10 TABLET | Refills: 0 | Status: SHIPPED | OUTPATIENT
Start: 2024-08-07

## 2024-08-07 ASSESSMENT — PATIENT HEALTH QUESTIONNAIRE - PHQ9
SUM OF ALL RESPONSES TO PHQ QUESTIONS 1-9: 0
5. POOR APPETITE OR OVEREATING: SEVERAL DAYS

## 2024-08-07 ASSESSMENT — ANXIETY QUESTIONNAIRES
GAD7 TOTAL SCORE: 4
7. FEELING AFRAID AS IF SOMETHING AWFUL MIGHT HAPPEN: NOT AT ALL
IF YOU CHECKED OFF ANY PROBLEMS ON THIS QUESTIONNAIRE, HOW DIFFICULT HAVE THESE PROBLEMS MADE IT FOR YOU TO DO YOUR WORK, TAKE CARE OF THINGS AT HOME, OR GET ALONG WITH OTHER PEOPLE: SOMEWHAT DIFFICULT
1. FEELING NERVOUS, ANXIOUS, OR ON EDGE: SEVERAL DAYS
3. WORRYING TOO MUCH ABOUT DIFFERENT THINGS: NOT AT ALL
2. NOT BEING ABLE TO STOP OR CONTROL WORRYING: NOT AT ALL
GAD7 TOTAL SCORE: 4
6. BECOMING EASILY ANNOYED OR IRRITABLE: SEVERAL DAYS
5. BEING SO RESTLESS THAT IT IS HARD TO SIT STILL: SEVERAL DAYS

## 2024-08-07 NOTE — PATIENT INSTRUCTIONS
Blood work when fasting  Mammogram  DEXA at age 65  No further paps  Colonoscopy in 2027  Tetanus in 2027       Patient Education   Preventive Care Advice   This is general advice given by our system to help you stay healthy. However, your care team may have specific advice just for you. Please talk to your care team about your preventive care needs.  Nutrition  Eat 5 or more servings of fruits and vegetables each day.  Try wheat bread, brown rice and whole grain pasta (instead of white bread, rice, and pasta).  Get enough calcium and vitamin D. Check the label on foods and aim for 100% of the RDA (recommended daily allowance).  Lifestyle  Exercise at least 150 minutes each week  (30 minutes a day, 5 days a week).  Do muscle strengthening activities 2 days a week. These help control your weight and prevent disease.  No smoking.  Wear sunscreen to prevent skin cancer.  Have a dental exam and cleaning every 6 months.  Yearly exams  See your health care team every year to talk about:  Any changes in your health.  Any medicines your care team has prescribed.  Preventive care, family planning, and ways to prevent chronic diseases.  Shots (vaccines)   HPV shots (up to age 26), if you've never had them before.  Hepatitis B shots (up to age 59), if you've never had them before.  COVID-19 shot: Get this shot when it's due.  Flu shot: Get a flu shot every year.  Tetanus shot: Get a tetanus shot every 10 years.  Pneumococcal, hepatitis A, and RSV shots: Ask your care team if you need these based on your risk.  Shingles shot (for age 50 and up)  General health tests  Diabetes screening:  Starting at age 35, Get screened for diabetes at least every 3 years.  If you are younger than age 35, ask your care team if you should be screened for diabetes.  Cholesterol test: At age 39, start having a cholesterol test every 5 years, or more often if advised.  Bone density scan (DEXA): At age 50, ask your care team if you should have this  scan for osteoporosis (brittle bones).  Hepatitis C: Get tested at least once in your life.  STIs (sexually transmitted infections)  Before age 24: Ask your care team if you should be screened for STIs.  After age 24: Get screened for STIs if you're at risk. You are at risk for STIs (including HIV) if:  You are sexually active with more than one person.  You don't use condoms every time.  You or a partner was diagnosed with a sexually transmitted infection.  If you are at risk for HIV, ask about PrEP medicine to prevent HIV.  Get tested for HIV at least once in your life, whether you are at risk for HIV or not.  Cancer screening tests  Cervical cancer screening: If you have a cervix, begin getting regular cervical cancer screening tests starting at age 21.  Breast cancer scan (mammogram): If you've ever had breasts, begin having regular mammograms starting at age 40. This is a scan to check for breast cancer.  Colon cancer screening: It is important to start screening for colon cancer at age 45.  Have a colonoscopy test every 10 years (or more often if you're at risk) Or, ask your provider about stool tests like a FIT test every year or Cologuard test every 3 years.  To learn more about your testing options, visit:   .  For help making a decision, visit:   https://bit.ly/mg20951.  Prostate cancer screening test: If you have a prostate, ask your care team if a prostate cancer screening test (PSA) at age 55 is right for you.  Lung cancer screening: If you are a current or former smoker ages 50 to 80, ask your care team if ongoing lung cancer screenings are right for you.  For informational purposes only. Not to replace the advice of your health care provider. Copyright   2023 [employer]. All rights reserved. Clinically reviewed by the [facility] Transitions Program. [employer] [id number] - REV 01/24.  Eating Healthy Foods: Care Instructions  With every meal, you can make healthy food choices. Try to  "eat a variety of fruits, vegetables, whole grains, lean proteins, and low-fat dairy products. This can help you get the right balance of nutrients, including vitamins and minerals. Small changes add up over time. You can start by adding one healthy food to your meals each day.    Try to make half your plate fruits and vegetables, one-fourth whole grains, and one-fourth lean proteins. Try including dairy with your meals.   Eat more fruits and vegetables. Try to have them with most meals and snacks.   Foods for healthy eating    Fruits    These can be fresh, frozen, canned, or dried.  Try to choose whole fruit rather than fruit juice.  Eat a variety of colors.    Vegetables    These can be fresh, frozen, canned, or dried.  Beans, peas, and lentils count too.    Whole grains    Choose whole-grain breads, cereals, and noodles.  Try brown rice.    Lean proteins    These can include lean meat, poultry, fish, and eggs.  You can also have tofu, beans, peas, lentils, nuts, and seeds.    Dairy    Try milk, yogurt, and cheese.  Choose low-fat or fat-free when you can.  If you need to, use lactose-free milk or fortified plant-based milk products, such as soy milk.    Water    Drink water when you're thirsty.  Limit sugar-sweetened drinks, including soda, fruit drinks, and sports drinks.  Where can you learn more?  Go to https://www.[employer].net/patiented  Enter T756 in the search box to learn more about \"Eating Healthy Foods: Care Instructions.\"  Current as of: September 20, 2023               Content Version: 14.0    2583-3111 [employer].   Care instructions adapted under license by your healthcare professional. If you have questions about a medical condition or this instruction, always ask your healthcare professional. [employer] disclaims any warranty or liability for your use of this information.      Learning About Being Physically Active  What is physical activity?     Being physically active " "means doing any kind of activity that gets your body moving.  The types of physical activity that can help you get fit and stay healthy include:  Aerobic or \"cardio\" activities. These make your heart beat faster and make you breathe harder, such as brisk walking, riding a bike, or running. They strengthen your heart and lungs and build up your endurance.  Strength training activities. These make your muscles work against, or \"resist,\" something. Examples include lifting weights or doing push-ups. These activities help tone and strengthen your muscles and bones.  Stretches. These let you move your joints and muscles through their full range of motion. Stretching helps you be more flexible.  Reaching a balance between these three types of physical activity is important because each one contributes to your overall fitness.  What are the benefits of being active?  Being active is one of the best things you can do for your health. It helps you to:  Feel stronger and have more energy to do all the things you like to do.  Focus better at school or work.  Feel, think, and sleep better.  Reach and stay at a healthy weight.  Lose fat and build lean muscle.  Lower your risk for serious health problems, including diabetes, heart attack, high blood pressure, and some cancers.  Keep your heart, lungs, bones, muscles, and joints strong and healthy.  How can you make being active part of your life?  Start slowly. Make it your long-term goal to get at least 30 minutes of exercise on most days of the week. Walking is a good choice. You also may want to do other activities, such as running, swimming, cycling, or playing tennis or team sports.  Pick activities that you like--ones that make your heart beat faster, your muscles stronger, and your muscles and joints more flexible. If you find more than one thing you like doing, do them all. You don't have to do the same thing every day.  Get your heart pumping every day. Any activity that " "makes your heart beat faster and keeps it at that rate for a while counts.  Here are some great ways to get your heart beating faster:  Go for a brisk walk, run, or hike.  Go for a swim or bike ride.  Take an online exercise class or dance.  Play a game of touch football, basketball, or soccer.  Play tennis, pickleball, or racquetball.  Climb stairs.  Even some household chores can be aerobic. Just do them at a faster pace. Raking or mowing the lawn, sweeping the garage, and vacuuming and cleaning your home all can help get your heart rate up.  Strengthen your muscles during the week. You don't have to lift heavy weights or grow big, bulky muscles to get stronger. Doing a few simple activities that make your muscles work against, or \"resist,\" something can help you get stronger. Aim for at least twice a week.  For example, you can:  Do push-ups or sit-ups, which use your own body weight as resistance.  Lift weights or dumbbells or use stretch bands at home or in a gym or community center.  Stretch your muscles often. Stretching will help you as you become more active. It can help you stay flexible and loosen tight muscles. It can also help improve your balance and posture and can be a great way to relax.  Be sure to stretch the muscles you'll be using when you work out. It's best to warm your muscles slightly before you stretch them. Walk or do some other light aerobic activity for a few minutes. Then start stretching.  When you stretch your muscles:  Do it slowly. Stretching is not about going fast or making sudden movements.  Don't push or bounce during a stretch.  Hold each stretch for at least 15 to 30 seconds, if you can. You should feel a stretch in the muscle, but not pain.  Breathe out as you do the stretch. Then breathe in as you hold the stretch. Don't hold your breath.  If you're worried about how more activity might affect your health, have a checkup before you start. Follow any special advice your " "doctor gives you for getting a smart start.  Where can you learn more?  Go to https://www.[employer].net/patiented  Enter W332 in the search box to learn more about \"Learning About Being Physically Active.\"  Current as of: June 5, 2023  Content Version: 14.1 2006-2024 [employer].   Care instructions adapted under license by your healthcare professional. If you have questions about a medical condition or this instruction, always ask your healthcare professional. [employer] disclaims any warranty or liability for your use of this information.       "

## 2024-08-07 NOTE — PROGRESS NOTES
Preventive Care Visit  [facility]  Sherice Jamil MD PhD, Family Medicine  Aug 7, 2024      Assessment & Plan     Anxiety  She has fear of flying and needs a refill of Klonopin  - clonazePAM (KLONOPIN) 0.5 MG ODT  Dispense: 10 tablet; Refill: 0    Hypercholesterolemia  She currently is not on a statin her last cholesterol was in 2022 we will recheck this  - Lipid panel reflex to direct LDL Fasting    Tingling in extremities  She has had longstanding symptoms of tingling in her hands and feet.  She finally went to the [facility].  She had an MRI.  Nothing significant  was found.  It was recommended she to wean off the Lexapro.  Her gabapentin dose was also decreased.  Her symptoms are slightly better.  She seems to be more settled with her symptoms    History of colonic polyps  She has a history of polyps had a colonoscopy in March 2024 and again they found polyps, she needs follow-up in 2027    Lichen sclerosus  There is a question of this diagnosis, GYN thinks she has lichen sclerosis but Derm does not feel she has this.  She has not had a biopsy.  Currently she is not having symptoms and she is followed by both GYN and Derm.  She is not using steroids at this time for the lichen sclerosus    BECCA (generalized anxiety disorder)  Her PHQ 9 was 0, she still has some mild intermittent anxiety at times, but she has gotten off of the Lexapro.    Annual physical exam  Immunizations are up to date will check several labs and screening, her mammogram also is due, she had a Pap with her GYN in February.    Screening for deficiency anemia  Will get CBC  - CBC with platelets and differential    Screening for thyroid disorder  Will check a thyroid  - TSH with free T4 reflex    Screening for diabetes mellitus  Will check a BMP  - Basic metabolic panel  (Ca, Cl, CO2, Creat, Gluc, K, Na, BUN)    Encounter for screening mammogram for breast cancer  Will get a mammogram  - MA  Screen Bilateral w/[facility]      Patient has been advised of split billing requirements and indicates understanding: Yes        Counseling  Appropriate preventive services were addressed with this patient via screening, questionnaire, or discussion as appropriate for fall prevention, nutrition, physical activity, Tobacco-use cessation, weight loss and cognition.  Checklist reviewing preventive services available has been given to the patient.  Reviewed patient's diet, addressing concerns and/or questions.           Return in about 53 weeks (around 8/13/2025) for Annual Wellness Visit.    Subjective   Casie Anne is a 63 year old, presenting for the following:  Physical (Routine, discontinued escitalopram  )        8/7/2024    11:59 AM   Additional Questions   Roomed by KTE, EMT        Via the Health Maintenance questionnaire, the patient has reported the following services have been completed -Cervical Cancer Screening: Kirsten Baig MD 2024-02-15, this information has been sent to the abstraction team.  Health Care Directive  Patient does not have a Health Care Directive or Living Will: Discussed advance care planning with patient; information given to patient to review.    HPI  Seen at [facility] - had MRI  -cognitive testing was negative - still has tingling in hands and feet -  slightly better - has decreased the gabapentin to 300mg/night.  Suggested stopping lexapro      Hx of anxiety - just stopped lexapro - end of May -  for most part OK - may get anxiety intermittently - brain fog, hard to breathe, and feels heart sensations - no counseling -  needs refill of klonopin for flying and if gets very anxious.  -no counseling -      Hx of tinnitus - has been worse this summer -      Gyn - had a physical with Dr Baig  2/2024 - had negative pap    She is Gr0  PM   Hormone Use: using vaginal estrogen cream - compounded    Lichen sclerosus 2016  - recent exam showed (2/2024) showed thickened epithelium and white  pigmentation above the clitoris - never biopsied -  using bucrista cream  (from  derm)      History of Pap Smear:  no previous abnormalities last one 2022  had this done 2024 - neg   Family History of Uterine or Ovarian Cancer:  No    HCM  History of Mammogram:  she has no history of abnormal mammograms - 2023  History of Abnormal Lipids:  Yes - borderline  Last colonoscopy   needs to schedule had polyps in   - just done 3/2024  - had polyps - recheck in 3 yrs  -    Last tetanus booster   DEXA - screen at age 65   Shingles vaccine:   Last Lipids:  Chol:  235    2022  T    2022  HDL:     81    2022  LDL  135  2022   General Health   How would you rate your overall physical health? Good   Feel stress (tense, anxious, or unable to sleep) To some extent      (!) STRESS CONCERN      2024   Nutrition   Three or more servings of calcium each day? (!) I DON'T KNOW   Diet: Regular (no restrictions)   How many servings of fruit and vegetables per day? 4 or more   How many sweetened beverages each day? 0-1            2024   Exercise   Days per week of moderate/strenous exercise 7 days   Average minutes spent exercising at this level 30 min            2024   Social Factors   Frequency of gathering with friends or relatives Once a week   Worry food won't last until get money to buy more No   Food not last or not have enough money for food? No   Do you have housing? (Housing is defined as stable permanent housing and does not include staying ouside in a car, in a tent, in an abandoned building, in an overnight shelter, or couch-surfing.) Yes   Are you worried about losing your housing? No   Lack of transportation? No   Unable to get utilities (heat,electricity)? No            2024   Fall Risk   Fallen 2 or more times in the past year? No   Trouble with walking or balance? No             2024   Dental   Dentist two times every year? Yes             8/2/2024   TB Screening   Were you born outside of the [place]? No            Today's PHQ-2 Score:       8/7/2024    11:58 AM   PHQ-2 ( 1999 [employer])   Q1: Little interest or pleasure in doing things 0   Q2: Feeling down, depressed or hopeless 0   PHQ-2 Score 0   Q1: Little interest or pleasure in doing things Not at all   Q2: Feeling down, depressed or hopeless Not at all   PHQ-2 Score 0           8/2/2024   Substance Use   Alcohol more than 3/day or more than 7/wk No   Do you use any other substances recreationally? No        Social History     Tobacco Use    Smoking status: Never    Smokeless tobacco: Never    Tobacco comments:     never smoked   Substance Use Topics    Alcohol use: Yes     Alcohol/week: 0.0 standard drinks of alcohol     Comment: wine - 4 glasses/wk    Drug use: No           8/7/2024   LAST FHS-7 RESULTS   1st degree relative breast or ovarian cancer No   Any relative bilateral breast cancer No   Any male have breast cancer No   Any ONE woman have BOTH breast AND ovarian cancer No   Any woman with breast cancer before 50yrs No   2 or more relatives with breast AND/OR ovarian cancer No   2 or more relatives with breast AND/OR bowel cancer No           Mammogram Screening - Mammogram every 1-2 years updated in Health Maintenance based on mutual decision making          8/2/2024   One time HIV Screening   Previous HIV test? No          8/2/2024   STI Screening   New sexual partner(s) since last STI/HIV test? No        History of abnormal Pap smear: No - age 30- 64 PAP with HPV every 5 years recommended       ASCVD Risk   The 10-year ASCVD risk score (Laura DAVISON, et al., 2019) is: 3.2%    Values used to calculate the score:      Age: 63 years      Sex: Female      Is Non- : No      Diabetic: No      Tobacco smoker: No      Systolic Blood Pressure: 117 mmHg      Is BP treated: No      HDL Cholesterol: 95 mg/dL      Total Cholesterol: 234 mg/dL           Reviewed  "and updated as needed this visit by Provider                          Review of Systems  Constitutional, HEENT, cardiovascular, pulmonary, GI, , musculoskeletal, neuro, skin, endocrine and psych systems are negative, except as otherwise noted.     Objective    Exam  /75 (BP Location: Right arm, Patient Position: Sitting, Cuff Size: Adult Regular)   Pulse 72   Ht 1.803 m (5' 10.98\")   Wt 75.7 kg (166 lb 12.8 oz)   LMP 03/25/2012   SpO2 98%   BMI 23.27 kg/m     Estimated body mass index is 23.27 kg/m  as calculated from the following:    Height as of this encounter: 1.803 m (5' 10.98\").    Weight as of this encounter: 75.7 kg (166 lb 12.8 oz).    Physical Exam  GENERAL: alert and no distress  EYES: Eyes grossly normal to inspection, PERRL and conjunctivae and sclerae normal  HENT: right ear canal plugged - left ear canal plugged  and TM  normal, nose and mouth without ulcers or lesions  NECK: no adenopathy, no asymmetry, masses, or scars  RESP: lungs clear to auscultation - no rales, rhonchi or wheezes  BREAST: normal without masses, tenderness or nipple discharge and no palpable axillary masses or adenopathy  CV: regular rate and rhythm, normal S1 S2, no S3 or S4, no murmur, click or rub, no peripheral edema  ABDOMEN: soft, nontender, no hepatosplenomegaly, no masses and bowel sounds normal  MS: no gross musculoskeletal defects noted, no edema  SKIN: no suspicious lesions or rashes  NEURO: Normal strength and tone, mentation intact and speech normal  PSYCH: mentation appears normal, affect normal/bright  NODES: No cervical, supraclavicular or axillary nodes       Signed Electronically by: Sherice Jamil MD PhD    "

## 2024-08-08 ENCOUNTER — PATIENT OUTREACH (OUTPATIENT)
Dept: GASTROENTEROLOGY | Facility: CLINIC | Age: 64
End: 2024-08-08
Payer: COMMERCIAL

## 2024-08-15 ENCOUNTER — TELEPHONE (OUTPATIENT)
Dept: FAMILY MEDICINE | Facility: CLINIC | Age: 64
End: 2024-08-15
Payer: COMMERCIAL

## 2024-08-22 ENCOUNTER — LAB (OUTPATIENT)
Dept: LAB | Facility: CLINIC | Age: 64
End: 2024-08-22
Payer: COMMERCIAL

## 2024-08-22 DIAGNOSIS — Z13.0 SCREENING FOR DEFICIENCY ANEMIA: ICD-10-CM

## 2024-08-22 DIAGNOSIS — Z13.1 SCREENING FOR DIABETES MELLITUS: ICD-10-CM

## 2024-08-22 DIAGNOSIS — E78.00 HYPERCHOLESTEROLEMIA: ICD-10-CM

## 2024-08-22 DIAGNOSIS — Z13.29 SCREENING FOR THYROID DISORDER: ICD-10-CM

## 2024-08-22 LAB
ANION GAP SERPL CALCULATED.3IONS-SCNC: 9 MMOL/L (ref 7–15)
BASOPHILS # BLD AUTO: 0 10E3/UL (ref 0–0.2)
BASOPHILS NFR BLD AUTO: 0 %
BUN SERPL-MCNC: 10.4 MG/DL (ref 8–23)
CALCIUM SERPL-MCNC: 9 MG/DL (ref 8.8–10.4)
CHLORIDE SERPL-SCNC: 106 MMOL/L (ref 98–107)
CHOLEST SERPL-MCNC: 219 MG/DL
CREAT SERPL-MCNC: 0.78 MG/DL (ref 0.51–0.95)
EGFRCR SERPLBLD CKD-EPI 2021: 85 ML/MIN/1.73M2
EOSINOPHIL # BLD AUTO: 0.2 10E3/UL (ref 0–0.7)
EOSINOPHIL NFR BLD AUTO: 3 %
ERYTHROCYTE [DISTWIDTH] IN BLOOD BY AUTOMATED COUNT: 12.5 % (ref 10–15)
FASTING STATUS PATIENT QL REPORTED: YES
FASTING STATUS PATIENT QL REPORTED: YES
GLUCOSE SERPL-MCNC: 91 MG/DL (ref 70–99)
HCO3 SERPL-SCNC: 26 MMOL/L (ref 22–29)
HCT VFR BLD AUTO: 40 % (ref 35–47)
HDLC SERPL-MCNC: 86 MG/DL
HGB BLD-MCNC: 13.1 G/DL (ref 11.7–15.7)
IMM GRANULOCYTES # BLD: 0 10E3/UL
IMM GRANULOCYTES NFR BLD: 0 %
LDLC SERPL CALC-MCNC: 118 MG/DL
LYMPHOCYTES # BLD AUTO: 2.3 10E3/UL (ref 0.8–5.3)
LYMPHOCYTES NFR BLD AUTO: 48 %
MCH RBC QN AUTO: 30.5 PG (ref 26.5–33)
MCHC RBC AUTO-ENTMCNC: 32.8 G/DL (ref 31.5–36.5)
MCV RBC AUTO: 93 FL (ref 78–100)
MONOCYTES # BLD AUTO: 0.2 10E3/UL (ref 0–1.3)
MONOCYTES NFR BLD AUTO: 4 %
NEUTROPHILS # BLD AUTO: 2.1 10E3/UL (ref 1.6–8.3)
NEUTROPHILS NFR BLD AUTO: 44 %
NONHDLC SERPL-MCNC: 133 MG/DL
PLATELET # BLD AUTO: 245 10E3/UL (ref 150–450)
POTASSIUM SERPL-SCNC: 4.5 MMOL/L (ref 3.4–5.3)
RBC # BLD AUTO: 4.29 10E6/UL (ref 3.8–5.2)
SODIUM SERPL-SCNC: 141 MMOL/L (ref 135–145)
TRIGL SERPL-MCNC: 77 MG/DL
TSH SERPL DL<=0.005 MIU/L-ACNC: 3.12 UIU/ML (ref 0.3–4.2)
WBC # BLD AUTO: 4.8 10E3/UL (ref 4–11)

## 2024-08-22 PROCEDURE — 84443 ASSAY THYROID STIM HORMONE: CPT

## 2024-08-22 PROCEDURE — 80061 LIPID PANEL: CPT

## 2024-08-22 PROCEDURE — 36415 COLL VENOUS BLD VENIPUNCTURE: CPT

## 2024-08-22 PROCEDURE — 85025 COMPLETE CBC W/AUTO DIFF WBC: CPT

## 2024-08-22 PROCEDURE — 80048 BASIC METABOLIC PNL TOTAL CA: CPT

## 2024-09-19 ENCOUNTER — ANCILLARY PROCEDURE (OUTPATIENT)
Dept: MAMMOGRAPHY | Facility: CLINIC | Age: 64
End: 2024-09-19
Attending: FAMILY MEDICINE
Payer: COMMERCIAL

## 2024-09-19 DIAGNOSIS — Z12.31 ENCOUNTER FOR SCREENING MAMMOGRAM FOR BREAST CANCER: ICD-10-CM

## 2024-09-19 PROCEDURE — 77067 SCR MAMMO BI INCL CAD: CPT | Mod: TC | Performed by: RADIOLOGY

## 2024-09-19 PROCEDURE — 77063 BREAST TOMOSYNTHESIS BI: CPT | Mod: TC | Performed by: RADIOLOGY

## 2024-10-22 ASSESSMENT — ANXIETY QUESTIONNAIRES: GAD7 TOTAL SCORE: 2

## 2024-10-24 ASSESSMENT — ANXIETY QUESTIONNAIRES: GAD7 TOTAL SCORE: 6

## 2024-10-29 ENCOUNTER — MYC MEDICAL ADVICE (OUTPATIENT)
Dept: FAMILY MEDICINE | Facility: CLINIC | Age: 64
End: 2024-10-29
Payer: COMMERCIAL

## 2024-10-29 DIAGNOSIS — R20.2 TINGLING IN EXTREMITIES: ICD-10-CM

## 2024-10-29 RX ORDER — GABAPENTIN 300 MG/1
300 CAPSULE ORAL AT BEDTIME
Qty: 90 CAPSULE | Refills: 1 | Status: SHIPPED | OUTPATIENT
Start: 2024-10-29

## 2024-12-05 ENCOUNTER — OFFICE VISIT (OUTPATIENT)
Dept: INTERNAL MEDICINE | Facility: CLINIC | Age: 64
End: 2024-12-05
Payer: COMMERCIAL

## 2024-12-05 VITALS
TEMPERATURE: 97.5 F | HEIGHT: 71 IN | WEIGHT: 164.7 LBS | HEART RATE: 78 BPM | OXYGEN SATURATION: 99 % | BODY MASS INDEX: 23.06 KG/M2 | SYSTOLIC BLOOD PRESSURE: 135 MMHG | RESPIRATION RATE: 16 BRPM | DIASTOLIC BLOOD PRESSURE: 79 MMHG

## 2024-12-05 DIAGNOSIS — Z11.4 SCREENING FOR HIV (HUMAN IMMUNODEFICIENCY VIRUS): ICD-10-CM

## 2024-12-05 DIAGNOSIS — H61.23 BILATERAL IMPACTED CERUMEN: Primary | ICD-10-CM

## 2024-12-05 DIAGNOSIS — R42 VERTIGO: ICD-10-CM

## 2024-12-05 DIAGNOSIS — H93.13 TINNITUS, BILATERAL: ICD-10-CM

## 2024-12-05 NOTE — PROGRESS NOTES
Assessment & Plan     (H61.23) Bilateral impacted cerumen  (primary encounter diagnosis)  Comment: Patient presented with impacted cerumen bilaterally. While inially there was concern as to possible perforation of the tympanic membrane which would have precluded washout, the patient did not report any symptoms consistent with TM rupture or perforation, thus it was safe to attempt cerumen removal. Cerumen removal was partially successful, with improvement of tinnitus and ability to visualize intact Tms bilaterally. Patient was sent home with debrox for continued therapy.  Plan: REMOVE IMPACTED CERUMEN, DEBROX 15 mL, 5 drops, both ears BID        ENT referral placed for tinnitus, vertigo, and cerumen follow up                  No follow-ups on file.    Subjective   Casie Anne is a 64 year old, presenting for the following health issues:  Follow Up (EAR WAX BUILDUP; tinnitus concerns)      12/5/2024     1:04 PM   Additional Questions   Roomed by MR     History of Present Illness       Reason for visit:  Check ears for wax buildup; tinnitus increased; slight occasional dizziness    She eats 4 or more servings of fruits and vegetables daily.She consumes 0 sweetened beverage(s) daily.She exercises with enough effort to increase her heart rate 20 to 29 minutes per day.  She exercises with enough effort to increase her heart rate 7 days per week.   She is taking medications regularly.     Earwax buildup: Chronic, recurring,   Had her right ear washed out at her last PCP visit  Had tinnitus worsening since and some vertigo, periodic nausea  Tinnitus is really loud in her left ear, this is where the hearing loss is the worst  Right ear tinnitus comes and goes now. No attributed cause for sensorineural hearing loss.   Last visit to ENT in 2021. At that time was treated for vertigo, tinnitus, headache.     Rare morning headaches, goes away quickly. Last a few days ago. Has not been changing or getting more frequent  Some  "periodic nausea, no vomiting. This is also chronic with her vertigo.   Last major vertigo was when she was exercising 3 weeks ago, then had a sensation of the room spinning. Has improved since then.                     Objective    /79 (BP Location: Right arm, Patient Position: Sitting, Cuff Size: Adult Regular)   Pulse 78   Temp 97.5  F (36.4  C) (Oral)   Resp 16   Ht 1.803 m (5' 10.98\")   Wt 74.7 kg (164 lb 11.2 oz)   LMP 03/25/2012   SpO2 99%   BMI 22.98 kg/m    Body mass index is 22.98 kg/m .  Physical Exam               Signed Electronically by: Javier Sales MD    "

## 2024-12-10 ENCOUNTER — TELEPHONE (OUTPATIENT)
Dept: OTOLARYNGOLOGY | Facility: CLINIC | Age: 64
End: 2024-12-10
Payer: COMMERCIAL

## 2024-12-10 NOTE — TELEPHONE ENCOUNTER
After doing dizzy intake, patient stated she is more so coming in for ear wax removal as she tried to get it out with per PCP but they may have pushed it farther in. Patient knows dizziness will not be discussed

## 2024-12-10 NOTE — TELEPHONE ENCOUNTER
1. Have you noticed any changes in hearing? No  2. Do you have ringing, buzzing, or other sounds in your ears or head, this is also referred to as Tinnitus? Yes  3. When and where was your last hearing test? [facility]  4. Do you feel lightheaded or foggy? Yes  5. Do you have a spinning sensation? Yes  6. Is there any specific position that can bring on dizziness? Random  7. Does looking up cause dizziness? No  8. Does getting in and our of bed cause dizziness? No  9. Does turning over in bed increase or cause dizziness? No  10. Does bending over cause dizziness? No  11. Is there anything that you can do to prevent the dizziness? no  12. Has the dizziness gotten better with time? No  13. Have you seen Physical Therapy for dizziness? (Please indicate clinic and as much of the location as possible): No  14. Are you being referred to a specific physician? No  15. Have you been evaluated/treated for your dizziness at any other location?  (If yes,obtian as much clinic/provider/locaiton as possible) Yes. (If yes answer the following questions:)   Have you seen any ENT, Neurology, or other providers for these symptoms?             Yes, If yes, where? Neuro- [facility] if yes, who?    Have you had any balance or Audiology testing? No Have you had an MRI or CT scan of your head or neck? Yes, If yes, where? Nancy f yes, who?     Would you like to receive your Release of Information by mail or e-mail?  e-mail

## 2024-12-11 ENCOUNTER — TRANSFERRED RECORDS (OUTPATIENT)
Dept: HEALTH INFORMATION MANAGEMENT | Facility: CLINIC | Age: 64
End: 2024-12-11

## 2024-12-16 ENCOUNTER — MYC REFILL (OUTPATIENT)
Dept: FAMILY MEDICINE | Facility: CLINIC | Age: 64
End: 2024-12-16
Payer: COMMERCIAL

## 2024-12-16 DIAGNOSIS — F41.9 ANXIETY: ICD-10-CM

## 2024-12-16 RX ORDER — CLONAZEPAM 0.5 MG/1
0.5 TABLET, ORALLY DISINTEGRATING ORAL 2 TIMES DAILY PRN
Qty: 10 TABLET | Refills: 0 | Status: SHIPPED | OUTPATIENT
Start: 2024-12-16

## 2024-12-16 NOTE — TELEPHONE ENCOUNTER
FUTURE VISIT INFORMATION      FUTURE VISIT INFORMATION:  Date: 3/31/25  Time: 9:40 AM  Location: [facility]   REFERRAL INFORMATION:  Referring provider:  Monse Nichols MD  Referring providers clinic:  [facility]   Reason for visit/diagnosis:  Tinnitus/ear wax- Referred by Monse Nichols MD in [facility]. Patient knows dizziness will not be covered in this     RECORDS REQUESTED FROM      Clinic name Comments Records Status Imaging Status   [facility]  12/5/2024 referral/ note- Javier Sales MD/ Monse Nichols MD  [facility] 2/1/2024 MR brain Scanned in  Pending   [facility] 11/18/2021 note- Montserrat Arciniega, FELICIA   11/18/2021 note- Susan Jackson AuD   11/18/2021 audiogram Epic

## 2024-12-16 NOTE — TELEPHONE ENCOUNTER
Controlled substance refill request notes    Refill request received for: Clonazepam 0.5 Mg Dis Tablet (ODT)  [place]  [place] reviewed 12/16/24:  Medication last refill: 10 tab, 5 day supply, filled/sold to patient on 08/15/2024  Pended order: Clonazepam 0.5 Mg Dis Tablet (ODT), 10 tab, 5 day supply, no delay in fill date     Primary care provider: Sherice Jamil  Last office visit this department: 8/7/2024 with Dr. Jamil, 12/05/2024 with Dr. Sales  Last virtual visit this department: Visit date not found  Next appointment with PCP: None    Refill request forwarded to provider for review.     Ashley BRAGG LPN  [facility]

## 2025-01-08 ENCOUNTER — NURSE TRIAGE (OUTPATIENT)
Dept: NURSING | Facility: CLINIC | Age: 65
End: 2025-01-08
Payer: COMMERCIAL

## 2025-01-08 NOTE — TELEPHONE ENCOUNTER
Per MyChart encounter dated 10/29/2024, the [facility]  RN advised the patient to schedule an appointment at the [facility] or to seek care at Urgent Care or in the Emergency Department if the patient feels like symptoms require immediate attention.

## 2025-01-08 NOTE — TELEPHONE ENCOUNTER
"  Nurse Triage SBAR    Is this a 2nd Level Triage? YES, LICENSED PRACTITIONER REVIEW IS REQUIRED    Situation: patient with Anxiety s/s x 1 month, increasing due to recent stressors .Asking about medications, please see protocol assessment  Wondering about if she can be started on buspirone, it helped her brother in law.  Also she is taking her Lexapro again, started it this am on her own.   She is also taking Clonepin at nighttime and daytime usage as well.   Has questions about this as well and dissolving vs regular scorable tablet.  Background:       Assessment:   Please see below for assessment    Protocol Recommended Disposition:   Routing to PCP for a call back    Recommendation:   Go for a walk,  Go to sisters while your spouse is out of town  Do some Yoga  Cold water facial submersion, pt states that she takes a cold shower every day, but will try this facial submersion as well.    Routed to provider        Reason for Disposition   Panic attack symptoms (diagnosed in the past) that is not better with usual treatment, reassurance, or Care Advice    Additional Information   Negative: SEVERE difficulty breathing (e.g., struggling for each breath, speaks in single words)   Negative: Bluish (or gray) lips or face   Negative: Difficult to awaken or acting confused (e.g., disoriented, slurred speech)   Negative: Violent behavior, or threatening to physically hurt or kill someone   Negative: Sounds like a life-threatening emergency to the triager   Negative: Difficulty breathing and persists > 10 minutes and not relieved by reassurance provided by triager   Negative: Feeling weak or lightheaded (e.g., woozy, feeling like they might faint), and lasts > 10 minutes and not relieved by reassurance provided by triager   Negative: Patient sounds very upset or troubled to the triager    Answer Assessment - Initial Assessment Questions  1. CONCERN: \"Did anything happen that prompted you to call today?\"       This has been " "building for the past month with s/s  \"I feel like I am going to fall apart\"  2. ANXIETY SYMPTOMS: \"Can you describe how you (your loved one; patient) have been feeling?\" (e.g., tense, restless, panicky, anxious, keyed up, overwhelmed, sense of impending doom).       Overwhelmed, brain fog, anxious  3. ONSET: \"How long have you been feeling this way?\" (e.g., hours, days, weeks)      Building this past month  4. SEVERITY: \"How would you rate the level of anxiety?\" (e.g., 0 - 10; or mild, moderate, severe).        5. FUNCTIONAL IMPAIRMENT: \"How have these feelings affected your ability to do daily activities?\" \"Have you had more difficulty than usual doing your normal daily activities?\" (e.g., getting better, same, worse; self-care, school, work, interactions)      Patient went for a long walk this am, took her  to the airport, spoke at length with her sister about how she is feeling.  She is currently at home alone and is thinking about going to her sisters home while her  is out of town.  Pt states that she is not suicidal,  \"I just know my body\"    6. HISTORY: \"Have you felt this way before?\" \"Have you ever been diagnosed with an anxiety problem in the past?\" (e.g., generalized anxiety disorder, panic attacks, PTSD). If Yes, ask: \"How was this problem treated?\" (e.g., medicines, counseling, etc.)      She has not reached out to Therapist and Psychologist is retired.  Sister good talk today about how she is feeling and possible going to stay with her while her spouse is out of town.  7. RISK OF HARM - SUICIDAL IDEATION: \"Do you ever have thoughts of hurting or killing yourself?\" If Yes, ask:  \"Do you have these feelings now?\" \"Do you have a plan on how you would do this?\"      Not suicial  8. TREATMENT:  \"What has been done so far to treat this anxiety?\" (e.g., medicines, relaxation strategies). \"What has helped?\"      Took a walk, took spouse to the Airport, talked with sister  Ashley hernández.  She " "started a Lexapro this am, on her own.  She has been using Clonepin at bedtime to help her sleep BUT is taking this during the daytime in this acute phase of anxiety.      9. THERAPIST: \"Do you have a counselor or therapist?\" If Yes, ask: \"What is their name?\"      Her Psychologist retired and she has not found another one.  Her Psychiatrist is at  and she is not sure if they are still around or not.  So no recent support noted here.  10. POTENTIAL TRIGGERS: \"Do you drink caffeinated beverages (e.g., coffee, rené, teas), and how much daily?\" \"Do you drink alcohol or use any drugs?\" \"Have you started any new medicines recently?\"        Healthy eating and excercise  11. PATIENT SUPPORT: \"Who is with you now?\" \"Who do you live with?\" \"Do you have family or friends who you can talk to?\"         Lives with spouse.  Sister locally and spoke with her in a lengthy conversation this am about how she is feeling.  12. OTHER SYMPTOMS: \"Do you have any other symptoms?\" (e.g., feeling depressed, trouble concentrating, trouble sleeping, trouble breathing, palpitations or fast heartbeat, chest pain, sweating, nausea, or diarrhea)        Brain fog, she states loss of sleep, but then states has been sleeping well, except for last night.  She does state that she has a pinching in her gut, both sides and upper into her back area. Abdomen is not distended or tender.  It is usually distended , but not now.  She states that the pinching is about 6 weeks old.  In December she did have some pain under her rib cage and back,  she states that it was a muscle strain and used heat and tiger balm on it to help with this.  2 weeks ago, she had a Respiratory virus in her chest with a cough,  She did not go to the MD,  She states that she is pretty well recovered from that.  She has had a lack of appetite that is about 2 weeks now.  She is having diarrhea and constipation the last 3 weeks.  She is taking in a lot of liquids, but has not taken " "stool softners, or Miralax, or MOM or senna.   Alot of cramping the last 6 weeks or so. As she was wondering if it was a virus.  She states this effects her mood , it develops very quickly.  \"I am not suicidal\"  I just have a lot of anxiety.  \"I know my body and have gone thru this in the past as well\"  Brother in law, was started on Buspirone and she was wondering if she needs to be started on this, will this help her?  Recent stressors:  Father in law just passed away and not sure if she can make it to the  feeling like this.  Just put her  on an airplane to [place] to take care of the arrangements.  Her Mother has fallen twice recently and she is coordinating things with her.    She is managing her anxiety with Clonopin,  Daytime dose is usually 1/2 tablet and if she needs another one, then she takes it.   This has been her practice the last month or so.  It is good for her for her acute s/s.    She is using it judiciously.   She is not addicted to it.  She states that she was just given some dissolvable tablets and that she can't do a good job of scoring them for 1/2 tablet.  She would like maybe the non dissolvable ones that she can score.      13. PREGNANCY: \"Is there any chance you are pregnant?\" \"When was your last menstrual period?\"        unknown    Protocols used: Anxiety and Panic Attack-A-[place]    "

## 2025-01-09 ASSESSMENT — ANXIETY QUESTIONNAIRES
7. FEELING AFRAID AS IF SOMETHING AWFUL MIGHT HAPPEN: MORE THAN HALF THE DAYS
GAD7 TOTAL SCORE: 16
1. FEELING NERVOUS, ANXIOUS, OR ON EDGE: NEARLY EVERY DAY
IF YOU CHECKED OFF ANY PROBLEMS ON THIS QUESTIONNAIRE, HOW DIFFICULT HAVE THESE PROBLEMS MADE IT FOR YOU TO DO YOUR WORK, TAKE CARE OF THINGS AT HOME, OR GET ALONG WITH OTHER PEOPLE: SOMEWHAT DIFFICULT
3. WORRYING TOO MUCH ABOUT DIFFERENT THINGS: MORE THAN HALF THE DAYS
6. BECOMING EASILY ANNOYED OR IRRITABLE: MORE THAN HALF THE DAYS
8. IF YOU CHECKED OFF ANY PROBLEMS, HOW DIFFICULT HAVE THESE MADE IT FOR YOU TO DO YOUR WORK, TAKE CARE OF THINGS AT HOME, OR GET ALONG WITH OTHER PEOPLE?: SOMEWHAT DIFFICULT
5. BEING SO RESTLESS THAT IT IS HARD TO SIT STILL: MORE THAN HALF THE DAYS
2. NOT BEING ABLE TO STOP OR CONTROL WORRYING: MORE THAN HALF THE DAYS
4. TROUBLE RELAXING: NEARLY EVERY DAY

## 2025-01-10 ENCOUNTER — VIRTUAL VISIT (OUTPATIENT)
Dept: INTERNAL MEDICINE | Facility: CLINIC | Age: 65
End: 2025-01-10
Payer: COMMERCIAL

## 2025-01-10 DIAGNOSIS — F41.1 GAD (GENERALIZED ANXIETY DISORDER): ICD-10-CM

## 2025-01-10 PROCEDURE — 98013 SYNCH AUDIO-ONLY EST LOW 20: CPT | Performed by: INTERNAL MEDICINE

## 2025-01-10 RX ORDER — ESCITALOPRAM OXALATE 5 MG/1
5 TABLET ORAL DAILY
Qty: 90 TABLET | Refills: 1 | Status: SHIPPED | OUTPATIENT
Start: 2025-01-10

## 2025-01-10 RX ORDER — BUSPIRONE HYDROCHLORIDE 5 MG/1
5 TABLET ORAL 2 TIMES DAILY
Qty: 120 TABLET | Refills: 1 | Status: SHIPPED | OUTPATIENT
Start: 2025-01-10

## 2025-01-10 RX ORDER — CLONAZEPAM 0.5 MG/1
0.5 TABLET ORAL 2 TIMES DAILY PRN
Qty: 20 TABLET | Refills: 1 | Status: SHIPPED | OUTPATIENT
Start: 2025-01-10

## 2025-01-10 ASSESSMENT — ANXIETY QUESTIONNAIRES: GAD7 TOTAL SCORE: 16

## 2025-01-10 NOTE — PATIENT INSTRUCTIONS
Thank you for visiting the Primary Care Center today at the [facility]! The following is some information about our clinic:     Primary Care Center Frequently-Asked Questions    (1) How do I schedule appointments at the [facility]?     Primary Care--to schedule or make changes to an existing appointment, please call our primary care line at [phone].    Labs--to schedule a lab appointment at the [facility] you can use Elite Form or call [phone]. If you have a [place] location that is closer to home, you can reach out to that location for scheduling options.     Imaging--if you need to schedule a CT, X-ray, MRI, ultrasound, or other imaging study you can call [phone] to schedule at the [facility] or any other [facility] imaging location.     Referrals--if a referral to another specialty was ordered you can expect a phone call from their scheduling team. If you have not heard from them in a week, please call us or send us a Elite Form message to check the status or get a scheduling number. Please note that this only applies to internal [facility] referrals. If the referral is external you would need to contact their office for scheduling.     (2) I have a question about my visit, who do I contact?     You can call us at the primary care line at [phone] to ask questions about your visit. You can also send a secure message through Elite Form, which is reviewed by clinic staff. Please note that Elite Form messages have a twenty-four to forty-eight business hour turnaround time and should not be used for urgent concerns.    (3) How will I get the results of my tests?    If you are signed up for [employer] all tests will be released to you within twenty-four hours of resulting. Please allow three to five days for your doctor to review your results and place a note interpreting the results. If you do not have [employer] you will receive your  results through mail seven to ten business days following the return of the tests. Please note that if there should be any urgent or concerning results that your doctor or their registered nurse will reach out to you the same day as the tests come back. If you have follow up questions about your results or would like to discuss the results in detail please schedule a follow up with your provider either in person or virtually.     (4) How do I get refills of my prescriptions?     You should always first contact your pharmacy for refills of your medications. If submitting a refill request on [employer], please be sure to submit the request only once--repeat requests can cause delays in refill. If you are requesting a NEW medication or a medication related to new symptoms you will need to schedule an appointment with a provider prior to approval. Please note: Routine medication refills have up to one to three business day turnaround whereas controlled substances refills have up to five to seven business day turnaround.    (5) I have new symptoms, what do I do?     If you are having an immediate medical emergency, you should dial 911 for assistance.   For anything urgent that needs to be seen within a few hours to one day you should visit a local urgent care for assistance.  For non-urgent symptoms that need to be seen within a few days to a week you can schedule with an available provider in primary care by going to [employer] or calling [phone].   If you are not sure how serious your symptoms are or you would like to receive medical advice you can always call [phone] to speak with a triage nurse.

## 2025-01-10 NOTE — PROGRESS NOTES
Casie Anne is a 64 year old who is being evaluated via a billable video visit.    How would you like to obtain your AVS? MyChart  If the video visit is dropped, the invitation should be resent by: Text to cell phone: [phone]  Will anyone else be joining your video visit? No      Video visit converted to telephone due to pt having setting issues while trying to connect in [employer] video. Provider approved for telephone visit change.    Assessment & Plan     BECCA (generalized anxiety disorder)  She has generalized anxiety disorder with situational issues that may make it worse right now.  She had previously been on Lexapro but came off of it when she was doing better.  She recognizes now that this may have been a mistake and has gone back on it.  She would prefer to stay at a low dose since she had symptoms at a higher dose.  She also does take Klonopin as needed for severe anxiety especially at night.  We did talk about her being able to take this during the day but do so cautiously because of the sedation involved.  Because of success she is hard of with others on buspirone she did ask about this medication.  Given the chronicity and intensity of the anxiety she has and the desire to minimize the use of Klonopin I think buspirone is a good option for her.  We talked about its use including the dose range.  She prefers to start at a low dose and work her way up as needed.  - escitalopram (LEXAPRO) 5 MG tablet  Dispense: 90 tablet; Refill: 1  - clonazePAM (KLONOPIN) 0.5 MG tablet  Dispense: 20 tablet; Refill: 1 -she can take this during the day but cautiously  - busPIRone (BUSPAR) 5 MG tablet twice a day dispense: 120 tablet; Refill: 1 -this can increase to 10 mg twice a day if there is inadequate response          No follow-ups on file.    Behavioral changes: uses light therapy and walks regularly    Subjective   Casie Anne is a 64 year old, presenting for the following health issues:  No chief complaint on  file.    History of Present Illness       Mental Health Follow-up:  Patient presents to follow-up on Depression & Anxiety.Patient's depression since last visit has been:  Worse  The patient is having other symptoms associated with depression.  Patient's anxiety since last visit has been:  Worse  The patient is having other symptoms associated with anxiety.  Any significant life events: relationship concerns and grief or loss  Patient is feeling anxious or having panic attacks.  Patient has no concerns about alcohol or drug use.    She eats 4 or more servings of fruits and vegetables daily.She consumes 1 sweetened beverage(s) daily.She exercises with enough effort to increase her heart rate 20 to 29 minutes per day.  She exercises with enough effort to increase her heart rate 7 days per week.   She is taking medications regularly.     She was unable to connect via video using multiple modalities so was switched to telephone.    She has depression and anxiety over her life and has gone off and one. She will years where she does well and then goes off. She was on Lexapro 2018 until 6 months ago. She probably should not have stopped. She is getting very agitated, uncomfortable, scary with anxiety. She has restarted some of her old Lexapro at 5mg already. She then uses Klonipin at night to help with sleep - she had asked Dr. Jamil for a refill and was given dissolvable but she would prefer to cut the regular pills so she can minimize the dosing.    Her brother in law had anxiety and she was put on Zoloft and buspirone. She wonders about buspirone.     She is on gabapentin and took another one in the middle of the night and felt less anxious.     She prefers to keep the Lexapro 5mg - higher doses make her more agitated even with the 10mg. She has 30 of them.       Stressors:  Her 's father just  prompting multiple visits to [place]. She cares for her mother in [place] who is having falls.                    Objective           Vitals:  No vitals were obtained today due to virtual visit.    Physical Exam             Virtual Visit Details    Type of service:  Telephone Visit   Phone call duration: 22 minutes   Originating Location (pt. Location): Home    Distant Location (provider location):  Off-site  Telephone visit completed due to the patient did not have access to video, while the distant provider did.  Signed Electronically by: Silverio Cortes MD

## 2025-01-26 ENCOUNTER — MYC MEDICAL ADVICE (OUTPATIENT)
Dept: INTERNAL MEDICINE | Facility: CLINIC | Age: 65
End: 2025-01-26
Payer: COMMERCIAL

## 2025-01-26 DIAGNOSIS — F41.1 GAD (GENERALIZED ANXIETY DISORDER): Primary | ICD-10-CM

## 2025-01-26 DIAGNOSIS — F41.9 ANXIETY: ICD-10-CM

## 2025-01-29 RX ORDER — HYDROXYZINE HYDROCHLORIDE 25 MG/1
25 TABLET, FILM COATED ORAL EVERY 6 HOURS PRN
Qty: 28 TABLET | Refills: 1 | Status: SHIPPED | OUTPATIENT
Start: 2025-01-29

## 2025-02-06 DIAGNOSIS — F41.9 ANXIETY: ICD-10-CM

## 2025-02-06 RX ORDER — CLONAZEPAM 0.5 MG/1
0.5 TABLET, ORALLY DISINTEGRATING ORAL 2 TIMES DAILY PRN
Qty: 10 TABLET | Refills: 0 | Status: CANCELLED | OUTPATIENT
Start: 2025-02-06

## 2025-02-06 RX ORDER — CLONAZEPAM 0.5 MG/1
0.5 TABLET, ORALLY DISINTEGRATING ORAL 2 TIMES DAILY PRN
Qty: 10 TABLET | Refills: 0 | Status: SHIPPED | OUTPATIENT
Start: 2025-02-06

## 2025-02-06 NOTE — PROGRESS NOTES
2-6-25 pt feels the clonazepam ODT works better than tablets - requesting refill  Ordered for 10 tablets  Sherice Jamil MD, PhD

## 2025-02-18 ENCOUNTER — MYC REFILL (OUTPATIENT)
Dept: FAMILY MEDICINE | Facility: CLINIC | Age: 65
End: 2025-02-18
Payer: COMMERCIAL

## 2025-02-18 ENCOUNTER — MYC MEDICAL ADVICE (OUTPATIENT)
Dept: INTERNAL MEDICINE | Facility: CLINIC | Age: 65
End: 2025-02-18
Payer: COMMERCIAL

## 2025-02-18 ENCOUNTER — TELEPHONE (OUTPATIENT)
Dept: FAMILY MEDICINE | Facility: CLINIC | Age: 65
End: 2025-02-18
Payer: COMMERCIAL

## 2025-02-18 DIAGNOSIS — F41.9 ANXIETY: ICD-10-CM

## 2025-02-18 DIAGNOSIS — G47.00 INSOMNIA, UNSPECIFIED TYPE: Primary | ICD-10-CM

## 2025-02-18 NOTE — TELEPHONE ENCOUNTER
Spoke with the patient to schedule an annual physical per check out: Return in about 53 weeks (around 8/13/2025) for Annual Wellness Visit.   Pt will call to schedule, but she also wants a med refill. Added appt notes for upcomming follow up on 2/28

## 2025-02-18 NOTE — TELEPHONE ENCOUNTER
Controlled substance refill request notes    Refill request received for: Clonazepam 0.5 Mg Dis Tablet  [place]  data reviewed 02/18/25:  Medication last refill:   Clonazepam 0.5 Mg Dis Tablet, 10 tab, 5 day supply, filled/sold to patient on 02/07/2025  Clonazepam 0.5 Mg Tablet, 20 tab, 10 day supply, filled/sold to patient on 02/07/2025  Pended order: Clonazepam 0.5 Mg Dis Tablet, 10 tab, 5 day supply, reviewing with care team regarding fill date    Primary care provider: Sherice Jamil  Last office visit this department: 8/7/2024  Last virtual visit this department: Visit date not found  Next appointment with PCC - Dr. Cortes - 02/28/2025     Refill request forwarded to provider for review.     Ashley BRAGG LPN  [facility]

## 2025-02-19 ENCOUNTER — TELEPHONE (OUTPATIENT)
Dept: OTOLARYNGOLOGY | Facility: CLINIC | Age: 65
End: 2025-02-19
Payer: COMMERCIAL

## 2025-02-19 NOTE — TELEPHONE ENCOUNTER
Patient confirmed scheduled appointment:     Date: 6/24/25  Time: 1:10PM  Appointment Type: New Ear  Provider: Pippa Arciniega NP   Location: [facility]  Testing/imaging: AUDIO @ 12:30PM  Additional Notes:

## 2025-02-20 RX ORDER — CLONAZEPAM 0.5 MG/1
0.5 TABLET, ORALLY DISINTEGRATING ORAL 2 TIMES DAILY PRN
Qty: 10 TABLET | Refills: 0 | Status: SHIPPED | OUTPATIENT
Start: 2025-02-22

## 2025-03-18 ENCOUNTER — MYC REFILL (OUTPATIENT)
Dept: INTERNAL MEDICINE | Facility: CLINIC | Age: 65
End: 2025-03-18
Payer: COMMERCIAL

## 2025-03-18 DIAGNOSIS — F41.1 GAD (GENERALIZED ANXIETY DISORDER): ICD-10-CM

## 2025-03-18 RX ORDER — ESCITALOPRAM OXALATE 5 MG/1
5 TABLET ORAL DAILY
Qty: 90 TABLET | Refills: 1 | Status: CANCELLED | OUTPATIENT
Start: 2025-03-18

## 2025-03-28 ENCOUNTER — HOSPITAL ENCOUNTER (EMERGENCY)
Facility: CLINIC | Age: 65
Discharge: HOME | End: 2025-03-28
Attending: EMERGENCY MEDICINE | Admitting: EMERGENCY MEDICINE
Payer: COMMERCIAL

## 2025-03-28 VITALS
BODY MASS INDEX: 19.21 KG/M2 | OXYGEN SATURATION: 96 % | HEIGHT: 72 IN | HEART RATE: 84 BPM | RESPIRATION RATE: 18 BRPM | DIASTOLIC BLOOD PRESSURE: 80 MMHG | WEIGHT: 141.8 LBS | SYSTOLIC BLOOD PRESSURE: 119 MMHG | TEMPERATURE: 97.8 F

## 2025-03-28 DIAGNOSIS — G47.09 OTHER INSOMNIA: ICD-10-CM

## 2025-03-28 DIAGNOSIS — F41.8 DEPRESSION WITH ANXIETY: ICD-10-CM

## 2025-03-28 PROCEDURE — 250N000013 HC RX MED GY IP 250 OP 250 PS 637: Performed by: PSYCHIATRY & NEUROLOGY

## 2025-03-28 PROCEDURE — 99284 EMERGENCY DEPT VISIT MOD MDM: CPT | Mod: 95 | Performed by: NURSE PRACTITIONER

## 2025-03-28 PROCEDURE — 99284 EMERGENCY DEPT VISIT MOD MDM: CPT

## 2025-03-28 RX ORDER — ESZOPICLONE 2 MG/1
2 TABLET, FILM COATED ORAL
Qty: 10 TABLET | Refills: 0 | Status: SHIPPED | OUTPATIENT
Start: 2025-03-28

## 2025-03-28 RX ORDER — CLONAZEPAM 0.5 MG/1
0.5 TABLET ORAL 2 TIMES DAILY PRN
Status: DISCONTINUED | OUTPATIENT
Start: 2025-03-28 | End: 2025-03-28 | Stop reason: HOSPADM

## 2025-03-28 RX ADMIN — CLONAZEPAM 0.5 MG: 0.5 TABLET ORAL at 12:39

## 2025-03-28 ASSESSMENT — COLUMBIA-SUICIDE SEVERITY RATING SCALE - C-SSRS
6. HAVE YOU EVER DONE ANYTHING, STARTED TO DO ANYTHING, OR PREPARED TO DO ANYTHING TO END YOUR LIFE?: NO
1. IN THE PAST MONTH, HAVE YOU WISHED YOU WERE DEAD OR WISHED YOU COULD GO TO SLEEP AND NOT WAKE UP?: NO
2. HAVE YOU ACTUALLY HAD ANY THOUGHTS OF KILLING YOURSELF IN THE PAST MONTH?: NO

## 2025-03-28 ASSESSMENT — ACTIVITIES OF DAILY LIVING (ADL)
ADLS_ACUITY_SCORE: 41

## 2025-03-28 NOTE — ED NOTES
[facility]  ED to EMPATH Checklist:      Goal for EMPATH: Depression management and Anxiety management    Current Behavior: Calm and Cooperative    Safety Concerns: Aggressive    Legal Hold Status: Voluntary    Medically Cleared by ED provider: Yes    Patient Therapeutically Searched: Not searched - Currently in triage    Belongings: Remain with patient    Independent Ambulation at Baseline: Yes/No: Yes    Participates in Care/Conversation: Yes/No: Yes    Patient Informed about EMPATH: Yes/No: Yes    DEC: Ordered and pending    Patient Ready to be Transferred to EMPATH? Yes/No: Yes

## 2025-03-28 NOTE — ED NOTES
Patient brought in from triage.  She was with her  and daughter. She was reluctant to come in but was encouraged by her family. Patient is here due to insomnia.  She quit lexapro in July and then due to an agitated depression she went on 5mg of lexapro.  She has been having trouble sleeping ever since. She averages 4 hours per night but gets between 0 and 6 hours.  She tried ambien but it did nothing.  She has since gone up to 7.5mg of lexapro.  She feels it is not the right medication. She has also lost 26 lbs in the last few months.She is fearful and hopeless.  She denies suicidal ideation, homicidal ideation and A/V hallucinations. She takes gabapentin, hydroxyzine and klonopin also at HS. She thinks that the klonopin and  hydroxyzine cause a burning sensation in her body at times.  She does want to go home as she feels she will not be comfortable sleeping here due to back pain and other things.Nursing and risk assessments completed.  Assessments reviewed with LMHP and physician. Video monitoring in progress, patient informed.  Admission information reviewed with patient. Patient given a tour of [facility] and instructions on using the facility. Questions regarding [facility] addressed.

## 2025-03-28 NOTE — CONSULTS
"Diagnostic Evaluation Consultation  Crisis Assessment    Patient Name: Casie Harris  Age:  64 year old  Legal Sex: female  Gender Identity: female  Pronouns:   Race: White  Ethnicity: Not  or   Language: English      Patient was assessed: Virtual: iPad   Crisis Assessment Start Date: 03/28/25  Crisis Assessment Start Time: 1146  Crisis Assessment Stop Time: 1202  Patient location: [facility] Emergency Dept                             [id number]    Referral Data and Chief Complaint  Casie Harris presents to the ED with family/friends. Patient is presenting to the ED for the following concerns: Other (see comment) (insomnia). Factors that make the mental health crisis life threatening or complex are: Pt presents to ED with her  and sister for concerns of worsening insomnia, specifically over the past week.  Upon assessment, pt is oriented, cooperative, but appears with flat affect and describes feeling \"depleted\". Pt denies any recent stressors that could be contributing to her insomnia, but reports several medication changes in the last few months. She has specifically be experiencing an increase in anxiety symptoms since December 2024, and her issues with sleeping started in January, increasing to only getting 0-6 hours of sleep a night over the past week. She now feels fearful and hopeless about getting to sleep. She has not been able to work due to trouble concentrating. She reports losing weight recently due to eating smaller portions. She denies suicidal ideation, homicidal ideation and A/V hallucinations. She is hoping for a medication change, and wants to go home as she feels she will not be comfortable sleeping on [facility]path recliner due to back pain and other things..      Informed Consent and Assessment Methods  Explained the crisis assessment process, including applicable information disclosures and limits to confidentiality, assessed understanding of the process, " and obtained consent to proceed with the assessment.  Assessment methods included conducting a formal interview with patient, review of medical records, collaboration with medical staff, and obtaining relevant collateral information from family and community providers when available.  : done     History of the Crisis   Pt has history of anxiety and depression who presents for worsening insomnia, anxiety, and depression since January 2025. No hx of IP  hospitalizations. She does have a therapist, that she sees every Tuesday. She previously worked with her primary doctor regarding her medications. She notes an upcoming psych consult this Monday 3/31.    Brief Psychosocial History  Family:  , Children yes  Support System:  , Children, Sibling(s)  Employment Status:  employed part-time  Source of Income:  salary/wages  Financial Environmental Concerns:  none  Current Hobbies:  outdoor activities, music, family functions  Barriers in Personal Life:  mental health concerns    Significant Clinical History  Current Anxiety Symptoms:  racing thoughts, excessive worry, anxious  Current Depression/Trauma:  irritable, hopelessness, difficulty concentrating, sadness, apathy  Current Somatic Symptoms:  anxious  Current Psychosis/Thought Disturbance:     Current Eating Symptoms:  loss of appetite  Chemical Use History:  Alcohol: None  Benzodiazepines: None  Opiates: None  Cocaine: None  Marijuana: None  Other Use: None   Past diagnosis:  Anxiety Disorder, Depression  Family history:  No known history of mental health or chemical health concerns  Past treatment:  Individual therapy, Psychiatric Medication Management, Primary Care  Details of most recent treatment:  She notes an upcoming psych consult this Monday 3/31  Other relevant history:  pt works as a . She lies with her . No legal history. Reports back pain as chronic condition    Have there been any medication changes in the past two  weeks:  yes, please comment       Is the patient compliant with medications:  yes        Collateral Information  Is there collateral information: Yes     Collateral information name, relationship, phone number:  MARY JANE EM (Spouse) [phone]    What happened today: She has been unable to sleep more than 4 hours a night over the past week, and is really not doing well and so her sister and I convinced her to come here with the hope of getting a medication change     What is different about patient's functioning: she has always struggled with anxiety, but it has been signifigantly increasing since December 2024. Since she hasn't been able to sleep, she hasn't been able to do any of her usual activites and she feels sad and frustrated.     What do you think the patient needs:      Has patient made comments about wanting to kill themselves/others: no    If d/c is recommended, can they take part in safety/aftercare planning:  yes    Additional collateral information:        Risk Assessment  Herrin Suicide Severity Rating Scale Full Clinical Version:  Suicidal Ideation  Q1 Wish to be Dead (Lifetime): No  Q2 Non-Specific Active Suicidal Thoughts (Lifetime): No  Q6 Suicide Behavior (Lifetime): no     Suicidal Behavior (Lifetime)  Actual Attempt (Lifetime): No  Has subject engaged in non-suicidal self-injurious behavior? (Lifetime): No  Interrupted Attempts (Lifetime): No  Aborted or Self-Interrupted Attempt (Lifetime): No  Preparatory Acts or Behavior (Lifetime): No    Herrin Suicide Severity Rating Scale Recent:   Suicidal Ideation (Recent)  Q1 Wished to be Dead (Past Month): no  Q2 Suicidal Thoughts (Past Month): no  Level of Risk per Screen: no risks indicated     Suicidal Behavior (Recent)  Actual Attempt (Past 3 Months): No  Has subject engaged in non-suicidal self-injurious behavior? (Past 3 Months): No  Interrupted Attempts (Past 3 Months): No  Aborted or Self-Interrupted Attempt (Past 3 Months):  No  Preparatory Acts or Behavior (Past 3 Months): No    Environmental or Psychosocial Events: other life stressors, loss of a loved one (father in law passed away in december, but this wasn't unexpected)  Protective Factors: Protective Factors: strong bond to family unit, community support, or employment, intact marriage or domestic partnership, responsibilities and duties to others, including pets and children, lives in a responsibly safe and stable environment, good treatment engagement    Does the patient have thoughts of harming others? Feels Like Hurting Others: no  Previous Attempt to Hurt Others: no  Is the patient engaging in sexually inappropriate behavior?: no  Does Patient have a known history of aggressive behavior: No    Is the patient engaging in sexually inappropriate behavior?  no        Mental Status Exam   Affect: Flat  Appearance: Appropriate  Attention Span/Concentration: Attentive  Eye Contact: Engaged    Fund of Knowledge: Appropriate   Language /Speech Content: Fluent  Language /Speech Volume: Normal  Language /Speech Rate/Productions: Normal  Recent Memory: Intact  Remote Memory: Intact  Mood: Anxious  Orientation to Person: Yes   Orientation to Place: Yes  Orientation to Time of Day: Yes  Orientation to Date: Yes     Situation (Do they understand why they are here?): Yes  Psychomotor Behavior: Normal  Thought Content: Clear  Thought Form: Intact     Mini-Cog Assessment  Number of Words Recalled:    Clock-Drawing Test:     Three Item Recall:    Mini-Cog Total Score:       Medication  Psychotropic medications:   Medication Orders - Psychiatric (From admission, onward)      None             Current Care Team  Patient Care Team:  Sherice Jamil MD PhD as PCP - General (Family Practice)  Sherice Jamil MD PhD as Referring Physician ([facility])  Montserrat Arciniega NP as Nurse Practitioner  Susan Jackson AuD as Audiologist (Audiology)  Sherice Jamil MD PhD as Assigned  PCP  Linda Lara MD as MD (Surgery)  Kathie Ball MD as MD (Surgery Surgical Critical Care)  Zain Dee MD as MD (Physical Medicine and Rehabilitation)  Silverio Cortes MD as MD (Internal Medicine - Pediatrics)    Diagnosis  Patient Active Problem List   Diagnosis Code    BECCA (generalized anxiety disorder) F41.1    Fibrocystic breast changes N60.19    Encounter for gynecological examination without abnormal finding Z01.419    Tinnitus H93.19    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 At this time the pt is not presenting as an acute risk to self or others due to the following factors: Pt denies active SI, with no plan or intent, and was able to engage in safety planning. Pt's primary concern is insomnia which is contributing to her anxiety, which in turn, then makes it more difficult to sleep. Pt identified previous barrier of not being able to get a medication change from her PCP due to him being on vacation for one month, and pt not being able to establish with a psychiatrist until 3/31. This barrier was addressed by consulting with EMPATH provider, Dr. Caballero, who will see pt and determine medication adjustments. Pt is agreeable to stay until then, but would like to discharge so that she can sleep in her own bed. Collateral is also in agreement with plan and confirms that he and his wife will follow up with the psychiatry appointment on 3/31.    Goals for crisis stabilization:  med  management    Next steps for Care Team:  psychiatry will see pt this afternoon    Treatment Objectives Addressed:  rapport building, identifying treatment goals, assessing safety    Therapeutic Interventions:  Engaged in safety planning, Reviewed healthy living that supports positive mental health, including looking at sleep hygiene, regular movement, nutrition, and regular socialization.    Has a specific means been identified for suicidal/homicide actions: No      Patient coping skills attempted to reduce the crisis:  help seeking    Disposition  Recommended referrals: Medication Management        Reviewed case and recommendations with attending provider. Attending Name: Dr. Nikita Saldaña       Attending concurs with disposition: yes       Patient and/or validated legal guardian concurs with disposition:   yes       Final disposition:  discharge             Legal status:  voluntary                                                                                                   Assessment Details   Total duration spent with the patient: 16 min     CPT code(s) utilized: 37975 - Psychotherapy (with patient) - 30 (16-37*) min    WHIT Benavides, Psychotherapist  DEC - Triage & Transition Services  Callback: [phone]       WHIT Benavides on 3/28/2025 at 12:35 PM

## 2025-03-28 NOTE — PLAN OF CARE
Casie Harris  March 28, 2025  Plan of Care Hand-off Note     Patient Recommended Care Path: discharge    Clinical Substantiation:  After therapeutic assessment, intervention and aftercare planning by ED care team and LM and in consultation with attending provider, the patient's circumstances and mental state were appropriate for outpatient management. At this time the pt is not presenting as an acute risk to self or others due to the following factors: Pt denies active SI, with no plan or intent, and was able to engage in safety planning. Pt's primary concern is insomnia which is contributing to her anxiety, which in turn, then makes it more difficult to sleep. Pt identified previous barrier of not being able to get a medication change from her PCP due to him being on vacation for one month, and pt not being able to establish with a psychiatrist until 3/31. This barrier was addressed by consulting with [facility] provider, Dr. Caballero, who will see pt and determine medication adjustments. Pt is agreeable to stay until then, but would like to discharge so that she can sleep in her own bed. Collateral is also in agreement with plan and confirms that he and his wife will follow up with the psychiatry appointment on 3/31.    Goals for crisis stabilization:  med management    Next steps for Care Team:  psychiatry will see pt this afternoon    Treatment Objectives Addressed:  rapport building, identifying treatment goals, assessing safety    Therapeutic Interventions:  Engaged in safety planning, Reviewed healthy living that supports positive mental health, including looking at sleep hygiene, regular movement, nutrition, and regular socialization.    Has a specific means been identified for suicidal.homicide actions: No      Patient coping skills attempted to reduce the crisis:  help seeking                 Collateral contact information:  MARY JANE EM (Spouse) [phone]    Legal Status:  voluntary                                                                                                                                        Psychiatry Consult: yes, pt will be seen on [facility] unit, with plan to discharge after    WHIT Benavides LICSW on 3/28/2025 at 12:36 PM

## 2025-03-28 NOTE — DISCHARGE INSTRUCTIONS
Today we discussed the following:    - Lunesta 2 mg at bedtime for sleep. Recommend taking gabapentin earlier in the evening and Lunesta at bedtime.     - We discussed not taking clonazepam with Lunesta, and also discussed talking with your outpatient psychiatrist about a slow benzodiazepine taper.    - Discuss longer-term sleep support with the psychiatrist you will see on Monday. We discussed trazodone as an option.    - We discussed switching from escitalopram (Lexapro) to desvenlafaxine (Pristiq) for depression and anxiety.    - We discussed the potential benefit of obtaining genetic testing. This is typically ordered in the outpatient setting. Here is a link to the [facility] option we discussed: [email or web address]

## 2025-03-28 NOTE — ED PROVIDER NOTES
Emergency Department Note      History of Present Illness     Chief Complaint   Insomnia, Anxiety, and Depression      HPI   Casie Harris is a 64 year old female with history of anxiety and depression who presents for worsening insomnia, anxiety, and depression since January 2025. Patient reports difficulty functioning and sleeping. Her symptoms have intensified this past 1 week. She states she has lost weight due to eating smaller portions. No suicidal thoughts. No recent illness. Patient has tried klonopin, gabapentin, atarax and Ambien, which have not been helping her much. She does have a therapist, but she has been talking with her primary doctor regarding her medications. She notes an upcoming psych consult this Monday 3/31. She has never been to [facility] before. Patient is otherwise healthy. She is a medical  and works periodically.     Independent Historian   None    Review of External Notes   None    Past Medical History     Medical History and Problem List   Anxiety  Depression   Autoimmune disease  Colon polyps  Fibrocystic breast changes  Hyperglycemia    Medications   Buspar  Klonopin  Lexapro  Neurontin  Atarax  Ambien     Surgical History   Colonoscopy x2  D&C  GYN surgery  HPV  Laser CO2 cervix    Physical Exam     Patient Vitals for the past 24 hrs:   BP Temp Temp src Pulse Resp SpO2   03/28/25 0932 106/67 98.2  F (36.8  C) Temporal 99 22 97 %     Physical Exam  Nursing note and vitals reviewed.  Constitutional:  Appears comfortable.   Eyes:    Conjunctivae are normal. No jaundice.  Pupils equal  Cardiovascular:  Normal rate, regular rhythm.      Normal heart sounds.     No murmur heard.  Pulmonary/Chest:  Breath sounds clear. No respiratory distress.     No stridor.   Musculoskeletal: No edema.  Moves all extremities equally.  Neurological:   Alert and appropriate. No focal weakness.  Gait is normal.  Skin:    Skin is warm and dry. No rash noted. No diaphoresis.   Psychiatric:    Patient with depressed mood, somewhat of a flat affect but makes good eye contact.  Not delusional or psychotic.    Diagnostics     Lab Results   Labs Ordered and Resulted from Time of ED Arrival to Time of ED Departure - No data to display    Independent Interpretation   None    ED Course      Medications Administered   Medications - No data to display    Discussion of Management   None    ED Course   ED Course as of 03/28/25 1011   Fri Mar 28, 2025   0940 I obtained history and examined the patient as noted above.         Additional Documentation  None    Medical Decision Making / Diagnosis     CMS Diagnoses: None    MIPS       None    MDM   Casie Harrsi is a 64 year old female who comes in with significant depressive and some anxiety symptoms and not sleeping.  Its gotten much worse the last 6 days.  She has an appoint with psychiatry on Monday but could not wait.  She has not been sick physically.  She is open to going to [facility].  No suicidal ideation.  She is healthy otherwise and she is cleared to go to [facility] for further evaluation.  She is tried numerous medications for her insomnia and they are not working.    Disposition   The patient was transferred to [facility].     Diagnosis     ICD-10-CM    1. Depression with anxiety  F41.8       2. Other insomnia  G47.09            Discharge Medications   New Prescriptions    No medications on file         Scribe Disclosure:  I, Chen Jean Baptiste, am serving as a scribe at 10:11 AM on 3/28/2025 to document services personally performed by Sakshi Tristan MD based on my observations and the provider's statements to me.        Sakshi Tristan MD  03/28/25 1014

## 2025-03-28 NOTE — ED PROVIDER NOTES
[facility] Unit - Psychiatric Consultation  [facility] Emergency Department    Casie Harris MRN: [id number]   Age: 64 year old YOB: 1960   Telemedicine Visit: The patient's condition can be safely assessed and treated via synchronous audio and visual telemedicine encounter.      Reason for Telemedicine Visit: Services only offered telehealth      Originating Site (Patient Location): [facility] emergency department unit    Distant Site (Provider Location): Provider Remote Home Office Setting    Consent:  The patient/guardian has verbally consented to: the potential risks and benefits of telemedicine (video visit or phone) versus in person care; bill my insurance or make self-payment for services provided; and responsibility for payment of non-covered services.     Mode of Communication: Wise Connect, a secure HIPAA compliant video platform      Start time:  1650  End time:   1725   History     Chief Complaint   Patient presents with    Insomnia    Anxiety    Depression     HPI  Casie Harris is a 64 year old female with history notable for    8  {Past History:545396}      Review of Systems  {Complete vs limited ROS:674958}    Physical Examination   BP: 106/67  Pulse: 99  Temp: 98.2  F (36.8  C)  Resp: 22  Height: 182.9 cm (6')  Weight: 64.3 kg (141 lb 12.8 oz)  SpO2: 97 %    Physical Exam  General: Appears stated age.   Neuro: Alert and fully oriented. Extremities appear to demonstrate normal strength on visual inspection.   Integumentary/Skin: no rash visualized, normal color    Psychiatric Examination   Appearance: { :327335}  Attitude:  { :209381}  Eye Contact:  { :623768}  Mood:  { :913481}  Affect:  { :433124}  Speech:  { :775433}  Psychomotor Behavior:  { :780601}  Thought Process:  { :270698}  Associations:  { :616626}  Thought Content:  { :625305}  Insight:  { :385230}  Judgement:  { :715510}  Oriented to:  { :045156}  Attention Span and Concentration:  { :413886}  Recent and Remote  Memory:  { :637647}  Language: able to name/identify objects without impairment  Fund of Knowledge: intact with awareness of current and past events    ED Course     ED Course as of 03/28/25 1733   Fri Mar 28, 2025   0940 I obtained history and examined the patient as noted above.         Labs Ordered and Resulted from Time of ED Arrival to Time of ED Departure - No data to display    Assessments & Plan (with Medical Decision Making)   Patient presenting with . Nursing notes reviewed noting no acute issues.     I have reviewed the assessment completed by the [facility].     Preliminary diagnosis:    ICD-10-CM    1. Depression with anxiety  F41.8       2. Other insomnia  G47.09            Treatment Plan:  - Stop Ambien.  - Lunesta 2 mg at bedtime, small supply ordered.  - No other medication changes at this time.  - Recommended discussing the following with the psychiatrist she has an appointment with on Monday:   - benzodiazepine taper   - longer-term sleep support, we discussed trazodone.   - consider GeneSight testing   - we discussed switching from escitalopram to desvenlafaxine    - consider CBTi  -Medication education provided this visit includes, rationale for medication, importance of compliance, medication interactions, and common side effects.   -Supportive psychotherapy provided regarding patient's stressors and past mental health symptoms problem solving ways to improve overall wellness and cope with acute and chronic mental health symptoms.  -Discharge home with family support.    --  KIM Valladares CNP   [facility] EMERGENCY DEPT  [facility] Unit      Problem Relation Age of Onset    Prostate Cancer Father     Heart Disease Father         congestive heart failure/AF    Cardiovascular Father         AF CHF    Cancer Father         prostate cancer    Neurologic Disorder Father     Cerebrovascular Disease Sister     Lipids Sister     Anxiety Disorder Sister     Early Death Sister     Heart Disease Maternal Grandfather     Heart Disease Paternal Grandmother     Cerebrovascular Disease Mother     Hypertension Mother     Anxiety Disorder Mother     Hypertension Sister     Breast Cancer Paternal Aunt     Suicide Sister     Migraines Sister     Breast Cancer Other     Cerebrovascular Disease Sister     Cerebrovascular Disease Sister     Hypertension Sister     Other Cancer Sister         CLL.SLL    Cancer Sister         CLL/SLL    Asthma Nephew     Asthma Niece     Depression No family hx of     Mental Illness No family hx of      Social History   Social History     Tobacco Use    Smoking status: Never    Smokeless tobacco: Never    Tobacco comments:     never smoked   Vaping Use    Vaping status: Never Used   Substance Use Topics    Alcohol use: Yes     Alcohol/week: 0.0 standard drinks of alcohol     Comment: wine - 4 glasses/wk    Drug use: No          Review of Systems  A medically appropriate review of systems was performed with pertinent positives and negatives noted in the HPI, and all other systems negative.    Physical Examination   BP: 106/67  Pulse: 99  Temp: 98.2  F (36.8  C)  Resp: 22  Height: 182.9 cm (6')  Weight: 64.3 kg (141 lb 12.8 oz)  SpO2: 97 %    Physical Exam  General: Appears stated age.   Neuro: Alert and fully oriented. Extremities appear to demonstrate normal strength on visual inspection.   Integumentary/Skin: no rash visualized, normal color    Psychiatric Examination   Appearance: awake, alert  Attitude:  cooperative  Eye Contact:  good  Mood:  anxious  Affect:  mood congruent  Speech:  clear, coherent  Psychomotor Behavior:  no evidence  of tardive dyskinesia, dystonia, or tics  Thought Process:  linear and goal oriented  Associations:  no loose associations  Thought Content:  no evidence of suicidal ideation or homicidal ideation and no evidence of psychotic thought  Insight:  fair  Judgement:  fair  Oriented to:  time, person, and place  Attention Span and Concentration:  intact  Recent and Remote Memory:  intact  Language: able to name/identify objects without impairment  Fund of Knowledge: intact with awareness of current and past events    ED Course     ED Course as of 03/28/25 1733   Fri Mar 28, 2025   0940 I obtained history and examined the patient as noted above.         Labs Ordered and Resulted from Time of ED Arrival to Time of ED Departure - No data to display    Assessments & Plan (with Medical Decision Making)   Patient presenting with an increase in insomnia with anxiety and depression who presented voluntarily primarily to treat insomnia and anxiety. She is open to medication changes. She reports that she sees a psychiatric provider and has a therapist. She would like to discharge home; there are no safety concerns. Together, through a shared decision-making process, a plan of care was developed as is noted below . Nursing notes reviewed noting no acute issues.     I have reviewed the assessment completed by the [facility].     Preliminary diagnosis:    ICD-10-CM    1. Depression with anxiety  F41.8       2. Other insomnia  G47.09            Treatment Plan:  - Stop Ambien.  - Lunesta 2 mg at bedtime, small supply ordered.  - No other medication changes at this time.  - Recommended discussing the following with the psychiatrist she has an appointment with on Monday:   - benzodiazepine taper   - longer-term sleep support, we discussed trazodone.   - consider GeneSight testing   - we discussed switching from escitalopram to desvenlafaxine    - consider CBTi  -Medication education provided this visit includes, rationale for medication,  importance of compliance, medication interactions, and common side effects.   -Supportive psychotherapy provided regarding patient's stressors and past mental health symptoms problem solving ways to improve overall wellness and cope with acute and chronic mental health symptoms.  -Discharge home with family support.    --  KIM Valladares CNP   [facility] EMERGENCY DEPT  [facility] Unit      Sarah Roland APRN CNP  05/09/25 7783

## 2025-03-28 NOTE — PROGRESS NOTES
Patient agreeable to discharge plan. Discharge instructions reviewed with patient including follow-up care plan. Medications: reviewed. Reviewed safety plan and outpatient resources. Denies SI and HI. All belongings that were brought into the hospital have been returned to patient. Escorted off the unit at 1740 accompanied by [facility] staff. Discharged to home via private transportation.

## 2025-03-28 NOTE — ED TRIAGE NOTES
Pt reports worsening anxiety, depression, and insomnia since January.  Pt denies SI, ETOH or drug use.     Triage Assessment (Adult)       Row Name 03/28/25 0932          Triage Assessment    Airway WDL WDL        Respiratory WDL    Respiratory WDL WDL        Skin Circulation/Temperature WDL    Skin Circulation/Temperature WDL WDL        Cardiac WDL    Cardiac WDL WDL        Peripheral/Neurovascular WDL    Peripheral Neurovascular WDL WDL        Cognitive/Neuro/Behavioral WDL    Cognitive/Neuro/Behavioral WDL WDL

## 2025-03-29 ENCOUNTER — TELEPHONE (OUTPATIENT)
Dept: BEHAVIORAL HEALTH | Facility: CLINIC | Age: 65
End: 2025-03-29
Payer: COMMERCIAL

## 2025-03-29 NOTE — TELEPHONE ENCOUNTER
LVM for PT regarding follow-up + left [facility] phone number. No other follow-up needed at this time.

## 2025-03-31 ENCOUNTER — PRE VISIT (OUTPATIENT)
Dept: OTOLARYNGOLOGY | Facility: CLINIC | Age: 65
End: 2025-03-31

## 2025-04-11 ENCOUNTER — LAB (OUTPATIENT)
Dept: LAB | Facility: CLINIC | Age: 65
End: 2025-04-11
Payer: COMMERCIAL

## 2025-04-11 DIAGNOSIS — Z11.4 SCREENING FOR HIV (HUMAN IMMUNODEFICIENCY VIRUS): Primary | ICD-10-CM

## 2025-04-14 LAB
ALBUMIN SERPL BCG-MCNC: 4.3 G/DL (ref 3.5–5.2)
ALP SERPL-CCNC: 68 U/L (ref 40–150)
ALT SERPL W P-5'-P-CCNC: 24 U/L (ref 0–50)
ANION GAP SERPL CALCULATED.3IONS-SCNC: 12 MMOL/L (ref 7–15)
AST SERPL W P-5'-P-CCNC: 21 U/L (ref 0–45)
BILIRUB DIRECT SERPL-MCNC: 0.09 MG/DL (ref 0–0.3)
BILIRUB SERPL-MCNC: 0.2 MG/DL
BUN SERPL-MCNC: 15.5 MG/DL (ref 8–23)
CALCIUM SERPL-MCNC: 9.2 MG/DL (ref 8.8–10.4)
CHLORIDE SERPL-SCNC: 106 MMOL/L (ref 98–107)
CREAT SERPL-MCNC: 0.87 MG/DL (ref 0.51–0.95)
EGFRCR SERPLBLD CKD-EPI 2021: 74 ML/MIN/1.73M2
GLUCOSE SERPL-MCNC: 119 MG/DL (ref 70–99)
HCO3 SERPL-SCNC: 24 MMOL/L (ref 22–29)
POTASSIUM SERPL-SCNC: 4.5 MMOL/L (ref 3.4–5.3)
PROT SERPL-MCNC: 6.6 G/DL (ref 6.4–8.3)
SODIUM SERPL-SCNC: 142 MMOL/L (ref 135–145)
TSH SERPL DL<=0.005 MIU/L-ACNC: 1.92 UIU/ML (ref 0.3–4.2)
VIT D+METAB SERPL-MCNC: 37 NG/ML (ref 20–50)

## 2025-04-21 ENCOUNTER — LAB (OUTPATIENT)
Dept: LAB | Facility: CLINIC | Age: 65
End: 2025-04-21
Payer: COMMERCIAL

## 2025-04-21 DIAGNOSIS — Z11.4 SCREENING FOR HIV (HUMAN IMMUNODEFICIENCY VIRUS): Primary | ICD-10-CM

## 2025-04-21 DIAGNOSIS — Z79.899 POLYPHARMACY: ICD-10-CM

## 2025-04-21 LAB
EST. AVERAGE GLUCOSE BLD GHB EST-MCNC: 117 MG/DL
HBA1C MFR BLD: 5.7 % (ref 0–5.6)

## 2025-04-21 PROCEDURE — 36415 COLL VENOUS BLD VENIPUNCTURE: CPT

## 2025-04-21 PROCEDURE — 83036 HEMOGLOBIN GLYCOSYLATED A1C: CPT

## 2025-05-05 ENCOUNTER — MYC MEDICAL ADVICE (OUTPATIENT)
Dept: INTERNAL MEDICINE | Facility: CLINIC | Age: 65
End: 2025-05-05
Payer: COMMERCIAL

## 2025-05-05 DIAGNOSIS — R73.09 ELEVATED GLUCOSE: Primary | ICD-10-CM

## 2025-05-08 ENCOUNTER — OFFICE VISIT (OUTPATIENT)
Dept: FAMILY MEDICINE | Facility: CLINIC | Age: 65
End: 2025-05-08
Payer: COMMERCIAL

## 2025-05-08 VITALS
WEIGHT: 140.5 LBS | SYSTOLIC BLOOD PRESSURE: 110 MMHG | TEMPERATURE: 97.1 F | OXYGEN SATURATION: 99 % | RESPIRATION RATE: 16 BRPM | DIASTOLIC BLOOD PRESSURE: 75 MMHG | BODY MASS INDEX: 19.06 KG/M2 | HEART RATE: 76 BPM

## 2025-05-08 DIAGNOSIS — Z11.4 SCREENING FOR HIV (HUMAN IMMUNODEFICIENCY VIRUS): ICD-10-CM

## 2025-05-08 DIAGNOSIS — R20.0 NUMBNESS AND TINGLING OF BOTH FEET: Primary | ICD-10-CM

## 2025-05-08 DIAGNOSIS — R73.09 ELEVATED GLUCOSE: ICD-10-CM

## 2025-05-08 DIAGNOSIS — R20.2 NUMBNESS AND TINGLING OF BOTH FEET: Primary | ICD-10-CM

## 2025-05-08 LAB
ALBUMIN UR-MCNC: NEGATIVE MG/DL
APPEARANCE UR: CLEAR
BACTERIA #/AREA URNS HPF: ABNORMAL /HPF
BILIRUB UR QL STRIP: NEGATIVE
COLOR UR AUTO: YELLOW
CRP SERPL-MCNC: <3 MG/L
EST. AVERAGE GLUCOSE BLD GHB EST-MCNC: 117 MG/DL
FASTING STATUS PATIENT QL REPORTED: NO
GLUCOSE SERPL-MCNC: 97 MG/DL (ref 70–99)
GLUCOSE UR STRIP-MCNC: NEGATIVE MG/DL
HBA1C MFR BLD: 5.7 % (ref 0–5.6)
HGB UR QL STRIP: NEGATIVE
HIV 1+2 AB+HIV1 P24 AG SERPL QL IA: NONREACTIVE
KETONES UR STRIP-MCNC: NEGATIVE MG/DL
LEUKOCYTE ESTERASE UR QL STRIP: NEGATIVE
NITRATE UR QL: NEGATIVE
PH UR STRIP: 5.5 [PH] (ref 5–7)
RBC #/AREA URNS AUTO: ABNORMAL /HPF
SP GR UR STRIP: 1.01 (ref 1–1.03)
UROBILINOGEN UR STRIP-ACNC: 0.2 E.U./DL
VIT B12 SERPL-MCNC: 512 PG/ML (ref 232–1245)
WBC #/AREA URNS AUTO: ABNORMAL /HPF

## 2025-05-08 PROCEDURE — 82607 VITAMIN B-12: CPT | Performed by: PHYSICIAN ASSISTANT

## 2025-05-08 PROCEDURE — 81001 URINALYSIS AUTO W/SCOPE: CPT | Performed by: PHYSICIAN ASSISTANT

## 2025-05-08 PROCEDURE — 99214 OFFICE O/P EST MOD 30 MIN: CPT | Performed by: PHYSICIAN ASSISTANT

## 2025-05-08 PROCEDURE — 3074F SYST BP LT 130 MM HG: CPT | Performed by: PHYSICIAN ASSISTANT

## 2025-05-08 PROCEDURE — 87389 HIV-1 AG W/HIV-1&-2 AB AG IA: CPT | Performed by: PHYSICIAN ASSISTANT

## 2025-05-08 PROCEDURE — 36415 COLL VENOUS BLD VENIPUNCTURE: CPT | Performed by: PHYSICIAN ASSISTANT

## 2025-05-08 PROCEDURE — 3078F DIAST BP <80 MM HG: CPT | Performed by: PHYSICIAN ASSISTANT

## 2025-05-08 PROCEDURE — 83036 HEMOGLOBIN GLYCOSYLATED A1C: CPT | Performed by: PHYSICIAN ASSISTANT

## 2025-05-08 PROCEDURE — 84207 ASSAY OF VITAMIN B-6: CPT | Mod: 90 | Performed by: PHYSICIAN ASSISTANT

## 2025-05-08 PROCEDURE — 99000 SPECIMEN HANDLING OFFICE-LAB: CPT | Performed by: PHYSICIAN ASSISTANT

## 2025-05-08 PROCEDURE — 86140 C-REACTIVE PROTEIN: CPT | Performed by: PHYSICIAN ASSISTANT

## 2025-05-08 PROCEDURE — 1125F AMNT PAIN NOTED PAIN PRSNT: CPT | Performed by: PHYSICIAN ASSISTANT

## 2025-05-08 PROCEDURE — 82947 ASSAY GLUCOSE BLOOD QUANT: CPT | Performed by: PHYSICIAN ASSISTANT

## 2025-05-08 PROCEDURE — 86038 ANTINUCLEAR ANTIBODIES: CPT | Performed by: PHYSICIAN ASSISTANT

## 2025-05-08 RX ORDER — MIRTAZAPINE 7.5 MG/1
15 TABLET, FILM COATED ORAL AT BEDTIME
COMMUNITY

## 2025-05-08 RX ORDER — MIRTAZAPINE 15 MG/1
TABLET, FILM COATED ORAL
COMMUNITY
Start: 2025-05-06

## 2025-05-08 ASSESSMENT — PAIN SCALES - GENERAL: PAINLEVEL_OUTOF10: MILD PAIN (2)

## 2025-05-08 NOTE — PROGRESS NOTES
{PROVIDER CHARTING PREFERENCE:992005}    Subjective   Casie Anne is a 64 year old, presenting for the following health issues:  No chief complaint on file.        5/8/2025     9:03 AM   Additional Questions   Roomed by Perlita     History of Present Illness       Reason for visit:  Feet feel padded    She eats 4 or more servings of fruits and vegetables daily.She consumes 0 sweetened beverage(s) daily.She exercises with enough effort to increase her heart rate 30 to 60 minutes per day.  She exercises with enough effort to increase her heart rate 7 days per week.   She is taking medications regularly.      Onset - 5 days ago   Shares that it started from her toes and radiated to the soles of feet   Tingling/Numbness present   Mild pain in left foot       {MA/LPN/RN Pre-Provider Visit Orders- hCG/UA/Strep (Optional):841366}  {SUPERLIST (Optional):119350}  {additonal problems for provider to add (Optional):695411}    {ROS Picklists (Optional):850198}      Objective    LMP 03/25/2012   There is no height or weight on file to calculate BMI.  Physical Exam   {Exam List (Optional):765701}    {Diagnostic Test Results (Optional):955100}        Signed Electronically by: Eduardo Martinez PA-C  {Email feedback regarding this note to [email or web address]   :948986}   click or rub, no peripheral edema   Diabetic foot exam: normal DP and PT pulses and normal sensory exam            Signed Electronically by: Eduardo Martinez PA-C

## 2025-05-09 LAB — ANA SER QL IF: NEGATIVE

## 2025-05-12 ENCOUNTER — RESULTS FOLLOW-UP (OUTPATIENT)
Dept: INTERNAL MEDICINE | Facility: CLINIC | Age: 65
End: 2025-05-12

## 2025-05-13 ENCOUNTER — PATIENT OUTREACH (OUTPATIENT)
Dept: CARE COORDINATION | Facility: CLINIC | Age: 65
End: 2025-05-13
Payer: COMMERCIAL

## 2025-05-15 LAB — PYRIDOXAL PHOS SERPL-SCNC: 69.8 NMOL/L

## 2025-05-30 ENCOUNTER — MYC MEDICAL ADVICE (OUTPATIENT)
Dept: ORTHOPEDICS | Facility: CLINIC | Age: 65
End: 2025-05-30
Payer: COMMERCIAL

## 2025-06-01 DIAGNOSIS — M54.2 CERVICALGIA: Primary | ICD-10-CM

## 2025-06-03 NOTE — PROGRESS NOTES
PHYSICAL THERAPY EVALUATION  Type of Visit: Evaluation         Fall Risk Screen:  Have you fallen 2 or more times in the past year?: No  Have you fallen and had an injury in the past year?: No    Subjective         Presenting condition or subjective complaint: upper back/neck pain  Date of onset: 06/01/25 (Referral date)    Relevant medical history: Depression; Hearing problems; Menopause; Migraines or headaches; Pain at night or rest   Dates & types of surgery:      Prior diagnostic imaging/testing results:       Prior therapy history for the same diagnosis, illness or injury: Yes Can't remember    Pt is a 64 year old female presenting with complaints of neck pain. Pt reports pain is worst in the morning (a lot of stiffness with headaches). Pt reports that the headaches start at the base of the skull and ascend upward. Pt thinks it may have been from gardening and pulling things, especially because that was when her lower extremity pain/numbness/tingling started. Pt will intermittently get some some pain along the left side of the cervical vertebrae. Pt reports that she feels like her whole spine is stiff    The symptoms started over the last week and is getting a bit better in terms of headaches but still feels very stiff.    Pt describes the pain as stiffness.     Aggravating Factors: tipping head back, turning head R>L    Alleviating Factors: heating pad, ibuprofen and Tylenol, Tiger balm    Prior treatments: has had PT in the past for neck pain (2023)    Sleep Quality: headaches were waking pt up but that has gotten better - hasn't happened in the past 2-3 nights    Red Flags: none    Pt goals: lifting, gardening, walk longer    Patient Active Problem List   Diagnosis    BECCA (generalized anxiety disorder)    Fibrocystic breast changes    Encounter for gynecological examination without abnormal finding    Tinnitus    HSV-1 infection    Complex cyst of right ovary    Tendinitis, de Quervain's    Neck pain        Living Environment  Social support: With a significant other or spouse   Type of home: House   Stairs to enter the home: Yes   Is there a railing: Yes     Ramp: No   Stairs inside the home: Yes 10 Is there a railing: Yes     Help at home: None  Equipment owned:       Employment: Yes Writer  Hobbies/Interests: walking, hiking, gardening, biking, kayaking, skiing, reading, watching movies    Patient goals for therapy: more gardening, lifting     Objective   CERVICAL:    Posture: min rounded shoulders    AROM / PROM: (*Indicates performed with pain):  -Flexion: mod limitation  -Extension: min- mod limitation  - L SB: 28  -R SB: 35  -L ROT: 65  -R ROT: 70    Neuro Screen:   Myotomes/Strength (*Denotes pain)  Myotomes L R   C4 (shoulder elevation) 5/5 5/5   C5 (shoulder abduction) 4+/5 4/5   C6 (elbow flexion) 5/5 5/5   C7 (elbow extension) 5/5 5/5   C8 (thumb extension) 5/5 5/5   T1 (finger add/abd) 5/5 5/5    Strength (lb)       Dermatomes: C2-T1 dermatomes intact to touch bilaterally   Reflexes: NT  Radford's: -B      Joint Mobility: wfl    Palpation: TTP noted suboccipitals     Repeated Motion Exam:   -Supine/seated retraction x10: improved extension ROM with improved shoulder ABD MMT  -Supine / seated retraction with self OP x10: improved extension and B ROT ROM with improved MMT   -Supine / seated retraction with extension x10: improved extension and B SB and ROT ROM with improved shoulder ABD MMT     Shoulder Screen Relevant Findings:   -ROM: flexion, scaption, ER and IR all WNL      Assessment & Plan   CLINICAL IMPRESSIONS  Medical Diagnosis: Cervicalgia    Treatment Diagnosis: Neck pain   Impression/Assessment: Patient is a 64 year old female with neck pain complaints.  The following significant findings have been identified: Pain, Decreased ROM/flexibility, Decreased joint mobility, Decreased strength, Impaired muscle performance, and Decreased activity tolerance. These impairments interfere with  their ability to perform self care tasks, work tasks, recreational activities, household chores, driving , household mobility, and community mobility as compared to previous level of function.     Clinical Decision Making (Complexity):  Clinical Presentation: Stable/Uncomplicated  Clinical Presentation Rationale: based on medical and personal factors listed in PT evaluation  Clinical Decision Making (Complexity): Low complexity    PLAN OF CARE  Treatment Interventions:  Modalities: Cryotherapy, E-stim, Hot Pack, Mechanical Traction, Ultrasound  Interventions: Manual Therapy, Neuromuscular Re-education, Therapeutic Activity, Therapeutic Exercise, Self-Care/Home Management    Long Term Goals     PT Goal 1  Goal Identifier: AROM  Goal Description: Pt will improve B ROT to at least 75 and B SB to at least 40 in order to improve ovrall function  Rationale: to maximize safety and independence with performance of ADLs and functional tasks;to maximize safety and independence within the home;to maximize safety and independence with transportation;to maximize safety and independence with self cares  Target Date: 07/16/25  PT Goal 2  Goal Identifier: Lifting  Goal Description: Pt will be able to lift without being limited by pain in order to return to PLOF  Rationale: to maximize safety and independence with self cares;to maximize safety and independence with performance of ADLs and functional tasks;to maximize safety and independence within the home  Target Date: 08/27/25  PT Goal 3  Goal Identifier: Gardening  Goal Description: Pt will be able to perform gardening duties without being limited by pain in order to improve QOL  Rationale: to maximize safety and independence with self cares;to maximize safety and independence with performance of ADLs and functional tasks  Target Date: 08/27/25      Frequency of Treatment: 1x/week, decreasing to every other as appropriate  Duration of Treatment: 12 weeks    Recommended Referrals  to Other Professionals: none at this time  Education Assessment:   Learner/Method: Patient    Risks and benefits of evaluation/treatment have been explained.   Patient/Family/caregiver agrees with Plan of Care.     Evaluation Time:     PT Eval, Low Complexity Minutes (79145): 20     Signing Clinician: Perlita Aguirre PT

## 2025-06-04 ENCOUNTER — THERAPY VISIT (OUTPATIENT)
Dept: PHYSICAL THERAPY | Facility: CLINIC | Age: 65
End: 2025-06-04
Attending: FAMILY MEDICINE
Payer: COMMERCIAL

## 2025-06-04 DIAGNOSIS — M54.2 CERVICALGIA: ICD-10-CM

## 2025-06-04 PROCEDURE — 97110 THERAPEUTIC EXERCISES: CPT | Mod: GP

## 2025-06-04 PROCEDURE — 97161 PT EVAL LOW COMPLEX 20 MIN: CPT | Mod: GP

## 2025-06-12 ENCOUNTER — MYC MEDICAL ADVICE (OUTPATIENT)
Dept: INTERNAL MEDICINE | Facility: CLINIC | Age: 65
End: 2025-06-12

## 2025-06-12 ENCOUNTER — VIRTUAL VISIT (OUTPATIENT)
Dept: URGENT CARE | Facility: CLINIC | Age: 65
End: 2025-06-12
Payer: COMMERCIAL

## 2025-06-12 DIAGNOSIS — R11.0 NAUSEA: ICD-10-CM

## 2025-06-12 DIAGNOSIS — Z01.10 ENCOUNTER FOR HEARING TEST: Primary | ICD-10-CM

## 2025-06-12 DIAGNOSIS — U07.1 COVID: Primary | ICD-10-CM

## 2025-06-12 RX ORDER — ONDANSETRON 4 MG/1
4 TABLET, ORALLY DISINTEGRATING ORAL EVERY 8 HOURS PRN
Qty: 6 TABLET | Refills: 0 | Status: SHIPPED | OUTPATIENT
Start: 2025-06-12

## 2025-06-12 NOTE — PROGRESS NOTES
Video visit:  Start time: 11:30 AM  Stop time: 11:44 AM  Duration: 14 minutes  Patient location: At home  Provider location: [facility] virtual provider (remote).  Platform used for video visit: [facility]          CHIEF COMPLAINT: COVID infection.      HPI: Patient is a 64-year-old female who developed symptoms yesterday and tested positive for COVID today.  She has had nausea, diarrhea, body achiness and headache.  Slight cough only.  No shortness of breath.  She tested negative yesterday but positive today.   also has COVID.      ROS: See HPI otherwise normal.    Allergies   Allergen Reactions    Nkda [No Known Drug Allergy]       Current Outpatient Medications   Medication Sig Dispense Refill    nirmatrelvir and ritonavir (PAXLOVID) 300 mg/100 mg therapy pack Take 3 tablets by mouth 2 times daily for 5 days. 30 tablet 0    ondansetron (ZOFRAN ODT) 4 MG ODT tab Take 1 tablet (4 mg) by mouth every 8 hours as needed. 6 tablet 0    Cholecalciferol (VITAMIN D) 2000 UNIT CAPS Take 2 daily      clonazePAM (KLONOPIN) 0.5 MG tablet Take 1 tablet (0.5 mg) by mouth 2 times daily as needed for anxiety. (Patient taking differently: Take 1 mg by mouth 2 times daily as needed for anxiety.) 20 tablet 2    estradiol (ESTRACE) 0.1 MG/GM vaginal cream Place 2 g vaginally twice a week Compounded by [facility]'s pharmacy      Probiotic Product (PROBIOTIC DAILY PO) Take by mouth as needed            PE: No acute distress on video visit.  She is alert and oriented.  No active coughing.  She is nondyspneic appearing speaking full sentences.        TREATMENT: None.      ASSESSMENT: Day 2 of COVID infection with relatively mild symptoms.  Patient has normal GFR in the 70s.  We discussed paxlovid interaction with her Klonopin and she will reduce her dose daily to 0.25 mg instead of 0.5 mg.  Zofran also will be ordered for intermittent nausea to aid her in oral intake.      DIAGNOSIS: COVID infection.      PLAN: Paxlovid, Zofran as  instructed.  Plenty of fluids.  Recheck 4 to 5 days if persistently ill, sooner if any worsening signs of illness especially shortness of breath.

## 2025-06-14 ENCOUNTER — VIRTUAL VISIT (OUTPATIENT)
Dept: URGENT CARE | Facility: CLINIC | Age: 65
End: 2025-06-14
Payer: COMMERCIAL

## 2025-06-14 DIAGNOSIS — U07.1 INFECTION DUE TO 2019 NOVEL CORONAVIRUS: Primary | ICD-10-CM

## 2025-06-14 PROCEDURE — 98005 SYNCH AUDIO-VIDEO EST LOW 20: CPT

## 2025-06-14 NOTE — PROGRESS NOTES
Casie Anne is a 64 year old who is being evaluated via a billable video visit.    How would you like to obtain your AVS? MyChart  If the video visit is dropped, the invitation should be resent by: Text to cell phone: [phone]  Will anyone else be joining your video visit? No      Assessment & Plan   Problem List Items Addressed This Visit    None  Visit Diagnoses         Infection due to 2019 novel coronavirus    -  Primary           Itching which patient reports she does get intermittently and is unsure if this is related to meds or recent dx of covid 19, low appetite, not sleeping well secondary to reduction in dosing clonazepam. Patient will use hydroxyzine in the middle of the night as needed if having difficulty sleeping.      Discussed patients questions about paxlovid, drug interactions, side effects and when to seek medical care. All patients questions addressed, patient verbalized understanding .         Subjective   Casie Anne is a 64 year old, presenting for the following health issues:  No chief complaint on file.    HPI              Review of Systems  Constitutional, HEENT, cardiovascular, pulmonary, GI, , musculoskeletal, neuro, skin, endocrine and psych systems are negative, except as otherwise noted.      Objective           Vitals:  No vitals were obtained today due to virtual visit.    Physical Exam   GENERAL: alert and no distress  EYES: Eyes grossly normal to inspection.  No discharge or erythema, or obvious scleral/conjunctival abnormalities.  RESP: No audible wheeze, cough, or visible cyanosis.    SKIN: Visible skin clear. No significant rash, abnormal pigmentation or lesions.  NEURO: Cranial nerves grossly intact.  Mentation and speech appropriate for age.  PSYCH: Appropriate affect, tone, and pace of words          Video-Visit Details    Type of service:  Video Visit   Originating Location (pt. Location): Home    Distant Location (provider location):  Off-site  Platform used for Video  Visit: Nida  Signed Electronically by: Virtual Urgent Care

## 2025-06-19 ENCOUNTER — MYC MEDICAL ADVICE (OUTPATIENT)
Dept: INTERNAL MEDICINE | Facility: CLINIC | Age: 65
End: 2025-06-19
Payer: COMMERCIAL

## 2025-06-22 ENCOUNTER — VIRTUAL VISIT (OUTPATIENT)
Dept: URGENT CARE | Facility: CLINIC | Age: 65
End: 2025-06-22
Payer: COMMERCIAL

## 2025-06-22 DIAGNOSIS — R10.84 ABDOMINAL PAIN, GENERALIZED: Primary | ICD-10-CM

## 2025-06-22 DIAGNOSIS — Z76.89 REFERRAL OF PATIENT: ICD-10-CM

## 2025-06-22 PROCEDURE — 99207 PR NON-BILLABLE SERV PER CHARTING: CPT

## 2025-06-23 NOTE — PROGRESS NOTES
1. Abdominal pain, generalized (Primary)    Generalized abdominal pain, recent post-COVID infection, on Paxlovid:  Patient presents with generalized abdominal discomfort starting 2 days ago, described as inflammation-like, with recent history of COVID-19 infection treated with Paxlovid (completed ~5 days ago). She also reports body aches and reduced oral intake, but has no red flag GI symptoms (e.g., fever, vomiting, diarrhea). Constipation appears to be resolving with MiraLAX. Differential includes post-viral inflammation, adverse effects of Paxlovid, IBS, infectious colitis, SBO.    2. Referral of patient    Due to the intensity of the pain and inability to perform an in-person exam, patient is being referred to the Emergency Department for further evaluation, including possible imaging and labs.  Advised patient to go to the ER promptly   Patient was agreeable to the plan        HPI   Patient was initially seen via video visit, which was transitioned to a telephone visit due to technical issues. She reports generalized abdominal pain that began 2 days ago, describing the pain as a constant discomfort rated up to 8/10 at its worst. She describes the sensation as if her  stomach or entire abdominal area is inflamed  and is seeking treatment to reduce inflammation. She has been taking Tylenol with mild relief.    Her history is notable for a COVID-19 infection 10 days ago, for which she completed a few days of Paxlovid, stopping approximately 5 days ago. In addition to abdominal pain, she also reports body aches and is concerned she may be experiencing a recurrence of symptoms.    She reports recent constipation but has been taking MiraLAX for the past 2 days and is now having bowel movements. Due to abdominal pain, her oral intake has been minimal. She is requesting guidance on treatment options to address what she perceives as abdominal inflammation        ROS: Pertinent ROS neg other than the symptoms noted above  in the HPI.     OBJECTIVE:  Vitals not done due to this being a virtual visit  GEN: No distress  RESP: No cough, no audible wheezing, able to talk in full sentences  Remainder of exam unable to be completed due to telephone visits    Alley Escobedo PA-C      Call length: 9.10 minutes  Provider location during call: [facility]  Patient location during call: Home

## 2025-06-24 NOTE — TELEPHONE ENCOUNTER
REFERRAL INFORMATION:  Referring By:  Monse Nichols MD  Referring Clinic: [facility].  Reason for Visit/Diagnosis: Tinnitus/ear wax- Referred by Monse Nichols MD in [facility]. Patient knows dizziness will not be covered in this    FUTURE VISIT INFORMATION:  Appointment Date: 10/2/25  Appointment Time: 9:40 AM   NOTES STATUS DETAILS   OFFICE NOTE from referring provider Internal  [facility]   12/5/24: Monse Nichols MD   OFFICE NOTE from other specialist Internal  [facility]  11/18/2021 note- Montserrat Arciniega NP       EAR       AUDIOLOGY NOTES Internal  [facility]   11/18/2021 note- Susan Jackson AuD   AUDIOGRAM, TYMPANOGRAM   *graph/ tracing raw data* Internal  [facility]   11/18/21- audiogram    IMAGES *pertaining images & report*       CT, MRI, PET, NM, US, XRAYS PACS  [place]:  2/1/24- MR Brain

## 2025-06-26 ENCOUNTER — OFFICE VISIT (OUTPATIENT)
Dept: PEDIATRICS | Facility: CLINIC | Age: 65
End: 2025-06-26
Payer: COMMERCIAL

## 2025-06-26 ENCOUNTER — TELEPHONE (OUTPATIENT)
Dept: INTERNAL MEDICINE | Facility: CLINIC | Age: 65
End: 2025-06-26
Payer: COMMERCIAL

## 2025-06-26 VITALS
TEMPERATURE: 98.1 F | BODY MASS INDEX: 20.11 KG/M2 | SYSTOLIC BLOOD PRESSURE: 134 MMHG | OXYGEN SATURATION: 98 % | WEIGHT: 148.5 LBS | RESPIRATION RATE: 16 BRPM | DIASTOLIC BLOOD PRESSURE: 84 MMHG | HEIGHT: 72 IN | HEART RATE: 79 BPM

## 2025-06-26 DIAGNOSIS — E87.1 HYPONATREMIA: ICD-10-CM

## 2025-06-26 DIAGNOSIS — R10.84 ABDOMINAL PAIN, GENERALIZED: Primary | ICD-10-CM

## 2025-06-26 LAB
ALBUMIN SERPL-MCNC: 3.9 G/DL (ref 3.4–5)
ALP SERPL-CCNC: 69 U/L (ref 40–150)
ALT SERPL W P-5'-P-CCNC: 20 U/L (ref 0–50)
ANION GAP SERPL CALCULATED.3IONS-SCNC: 9 MMOL/L (ref 3–14)
AST SERPL W P-5'-P-CCNC: 23 U/L (ref 0–45)
BASOPHILS # BLD AUTO: 0 10E3/UL (ref 0–0.2)
BASOPHILS NFR BLD AUTO: 0 %
BILIRUB SERPL-MCNC: 0.7 MG/DL (ref 0.2–1.3)
BUN SERPL-MCNC: 11 MG/DL (ref 7–30)
CALCIUM SERPL-MCNC: 9.4 MG/DL (ref 8.5–10.1)
CHLORIDE BLD-SCNC: 102 MMOL/L (ref 94–109)
CO2 SERPL-SCNC: 25 MMOL/L (ref 20–32)
CREAT SERPL-MCNC: 0.7 MG/DL (ref 0.52–1.04)
EGFRCR SERPLBLD CKD-EPI 2021: >90 ML/MIN/1.73M2
EOSINOPHIL # BLD AUTO: 0 10E3/UL (ref 0–0.7)
EOSINOPHIL NFR BLD AUTO: 0 %
ERYTHROCYTE [DISTWIDTH] IN BLOOD BY AUTOMATED COUNT: 12.4 % (ref 10–15)
GLUCOSE BLD-MCNC: 117 MG/DL (ref 70–99)
HCT VFR BLD AUTO: 39.5 % (ref 35–47)
HGB BLD-MCNC: 13 G/DL (ref 11.7–15.7)
IMM GRANULOCYTES # BLD: 0 10E3/UL
IMM GRANULOCYTES NFR BLD: 0 %
LYMPHOCYTES # BLD AUTO: 1.8 10E3/UL (ref 0.8–5.3)
LYMPHOCYTES NFR BLD AUTO: 33 %
MCH RBC QN AUTO: 30.5 PG (ref 26.5–33)
MCHC RBC AUTO-ENTMCNC: 32.9 G/DL (ref 31.5–36.5)
MCV RBC AUTO: 93 FL (ref 78–100)
MONOCYTES # BLD AUTO: 0.2 10E3/UL (ref 0–1.3)
MONOCYTES NFR BLD AUTO: 4 %
NEUTROPHILS # BLD AUTO: 3.4 10E3/UL (ref 1.6–8.3)
NEUTROPHILS NFR BLD AUTO: 63 %
PLATELET # BLD AUTO: 268 10E3/UL (ref 150–450)
POTASSIUM BLD-SCNC: 4.2 MMOL/L (ref 3.4–5.3)
PROT SERPL-MCNC: 6.7 G/DL (ref 6.8–8.8)
RBC # BLD AUTO: 4.26 10E6/UL (ref 3.8–5.2)
SODIUM SERPL-SCNC: 136 MMOL/L (ref 135–145)
WBC # BLD AUTO: 5.5 10E3/UL (ref 4–11)

## 2025-06-26 RX ORDER — DICYCLOMINE HCL 20 MG
20 TABLET ORAL 4 TIMES DAILY PRN
Qty: 40 TABLET | Refills: 0 | Status: SHIPPED | OUTPATIENT
Start: 2025-06-26

## 2025-06-26 NOTE — PROGRESS NOTES
Assessment/Plan:    Hyponatremia resolved. Labs unremarkable.   Benign abdominal exam, vitals stable.   Offered CT due to persistent symptoms but discussed I felt the likelihood of an acute abdominal issue is low. Pt elects to defer imaging for now.   Suspect may be due to adverse effect of Paxlovid or recent COVID.   Rx Bentyl. [place] diet with gradual advancement as tolerated.     See patient instructions below.    At the end of the encounter, I discussed results, diagnosis, medications. Discussed red flags for immediate return to clinic/ER, as well as indications for follow up if no improvement. Patient understood and agreed to plan. Patient was stable for discharge.      ICD-10-CM    1. Abdominal pain, generalized  R10.84 CBC with platelets differential     CBC with platelets differential     dicyclomine (BENTYL) 20 MG tablet      2. Hyponatremia  E87.1 Comprehensive metabolic panel     Comprehensive metabolic panel            No follow-ups on file.    MACKENZIE Ty, RONDA  [facility]  -----------------------------------------------------------------------------------------------------------------------------------------------------    HPI:  Casie Harris is a 64 year old female who presents for evaluation of the following:    Abdominal/Flank Pain  Onset/Duration: 6 days  Description:   Character: Cramping  Location: generalized  Radiation: None  Intensity: mild, moderate  Progression of Symptoms:  same  Accompanying Signs & Symptoms:  Fever/chills: no   Gas/Bloating: YES  Nausea: no  Vomitting: no   Diarrhea: no   Constipation:YES- improved now  Dysuria: no            Hematuria: no            Frequency: no            Incontinence of urine: no   History:            Last bowel movement: today  Trauma: no   Previous similar pain: no  Previous tests done: none           Previous Abdominal surgery: YES- fallopian tube removal and one ovary removed  Precipitating factors:    Does the pain change with:     Food: no      Bowel Movement: no     Urination: no              Other factors: no   Therapies tried and outcome:  Took Miralax for constipation with improvement, had large BM this AM    Pt tested positive for COVID 2 weeks ago, was prescribed Paxlovid. These symptoms began after she finished the Paxlovid. She was seen in urgent care at an outside facility on 6/23 and was found to have mild hyponatremia. She has increased her salt intake since then. Labs otherwise unremarkable, no advanced imaging was done.   Is not worse, but also not better so returns for re-evaluation.     Past Medical History:   Diagnosis Date    Abnormal Pap smear     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    Anxiety off and on    Arthritis     spine    Autoimmune disease 1987    Chronic Fatigue Syndrome    Benign positional vertigo 7 November    Woke with severe vertigo, nausea, vomiting until afternoon    Colon polyps 2014; 2018    Sensorineural hearing loss 2012, 2017    Tinnitus 04/2017       Vitals:    06/26/25 1348   BP: 134/84   BP Location: Left arm   Patient Position: Sitting   Cuff Size: Adult Regular   Pulse: 79   Resp: 16   Temp: 98.1  F (36.7  C)   SpO2: 98%   Weight: 67.4 kg (148 lb 8 oz)   Height: 1.829 m (6')       Physical Exam  Vitals and nursing note reviewed.   Pulmonary:      Effort: Pulmonary effort is normal.   Abdominal:      General: Bowel sounds are normal. There is no distension.      Palpations: Abdomen is soft.      Tenderness: There is no abdominal tenderness.   Neurological:      Mental Status: She is alert.         Labs/Imaging:  Recent Results (from the past 24 hours)   CBC with platelets differential    Narrative    The following orders were created for panel order CBC with platelets differential.  Procedure                               Abnormality         Status                     ---------                               -----------         ------                     CBC with platelets and  ...[[id number]]                      Final result                 Please view results for these tests on the individual orders.   Comprehensive metabolic panel   Result Value Ref Range    Sodium 136 135 - 145 mmol/L    Potassium 4.2 3.4 - 5.3 mmol/L    Chloride 102 94 - 109 mmol/L    Carbon Dioxide (CO2) 25 20 - 32 mmol/L    Anion Gap 9 3 - 14 mmol/L    Urea Nitrogen 11 7 - 30 mg/dL    Creatinine 0.70 0.52 - 1.04 mg/dL    GFR Estimate >90 >60 mL/min/1.73m2    Calcium 9.4 8.5 - 10.1 mg/dL    Glucose 117 (H) 70 - 99 mg/dL    Alkaline Phosphatase 69 40 - 150 U/L    AST 23 0 - 45 U/L    ALT 20 0 - 50 U/L    Protein Total 6.7 (L) 6.8 - 8.8 g/dL    Albumin 3.9 3.4 - 5.0 g/dL    Bilirubin Total 0.7 0.2 - 1.3 mg/dL   CBC with platelets and differential   Result Value Ref Range    WBC Count 5.5 4.0 - 11.0 10e3/uL    RBC Count 4.26 3.80 - 5.20 10e6/uL    Hemoglobin 13.0 11.7 - 15.7 g/dL    Hematocrit 39.5 35.0 - 47.0 %    MCV 93 78 - 100 fL    MCH 30.5 26.5 - 33.0 pg    MCHC 32.9 31.5 - 36.5 g/dL    RDW 12.4 10.0 - 15.0 %    Platelet Count 268 150 - 450 10e3/uL    % Neutrophils 63 %    % Lymphocytes 33 %    % Monocytes 4 %    % Eosinophils 0 %    % Basophils 0 %    % Immature Granulocytes 0 %    Absolute Neutrophils 3.4 1.6 - 8.3 10e3/uL    Absolute Lymphocytes 1.8 0.8 - 5.3 10e3/uL    Absolute Monocytes 0.2 0.0 - 1.3 10e3/uL    Absolute Eosinophils 0.0 0.0 - 0.7 10e3/uL    Absolute Basophils 0.0 0.0 - 0.2 10e3/uL    Absolute Immature Granulocytes 0.0 <=0.4 10e3/uL     No results found for this or any previous visit (from the past 24 hours).        There are no Patient Instructions on file for this visit.

## 2025-06-26 NOTE — PROGRESS NOTES
Acute and Diagnostic Services Clinic Visit    {PROVIDER CHARTING PREFERENCE:693521}    Subjective   Casie Anne is a 64 year old, presenting for the following health issues:  No chief complaint on file.    HPI      Abdominal/Flank Pain  Onset/Duration: 2 weeks  Description:   Character: Cramping  Location: generalized  Radiation: None  Intensity: mild, moderate  Progression of Symptoms:  same  Accompanying Signs & Symptoms:  Fever/chills: no   Gas/Bloating: YES  Nausea: YES  Vomitting: no   Diarrhea: no   Constipation:YES  Dysuria: no            Hematuria: no            Frequency: no            Incontinence of urine: no   History:            Last bowel movement: today  Trauma: no   Previous similar pain: YES   Previous tests done: none           Previous Abdominal surgery: YES- fallopian tube removal and one ovary removed  Precipitating factors:   Does the pain change with:     Food: no      Bowel Movement: { :381694}    Urination: { :322922}             Other factors: { :345540}  Therapies tried and outcome:  {:675613}    When food last eaten: ***      {ROS Picklists (Optional):459621}      Objective    LMP 03/25/2012   There is no height or weight on file to calculate BMI.  Physical Exam   {Exam List (Optional):303845}    {Diagnostic Test Results (Optional):024168}        Signed Electronically by: Melanie Abernathy PA-C  {Email feedback regarding this note to [email or web address]   :[id number]}

## 2025-06-26 NOTE — TELEPHONE ENCOUNTER
Called patient per Dr. Duke request as she scheduled with him tomorrow for ongoing intestinal pain post covid and he felt she may be a better fit for [facility] is needing imaging due to continued pain    Called patient and she states minimal improvement since starting miralax from UC and she feels like the pain is intestinal inflammation after having covid her sodium was also low at 131 at      Called [facility] to see if they are able to accept patient    [facility] will see patient at 1:30       Referral to Acute and Diagnostic Services    [phone] ([place]) [place] [address], [address], [place] [address]    Transition to Acute & Diagnostic Services Clinic has been discussed with patient, and she agrees with next level of care.   Patient understands that evaluation/treatment at [facility] typically takes significantly longer than in clinic/urgent care (>2 hours).  [facility] has been contacted by provider/staff to confirm patient acceptance.         Special issues:      None

## 2025-07-28 ENCOUNTER — PRE VISIT (OUTPATIENT)
Dept: INTERNAL MEDICINE | Facility: CLINIC | Age: 65
End: 2025-07-28
Payer: COMMERCIAL

## 2025-07-28 NOTE — TELEPHONE ENCOUNTER
Date of upcoming preventative visit: 2025    Date of last preventative visit: 2024    Relevant notes from last preventative visit: was seen at [facility] with tingling in hands and feet. Gabapentin was decreased. Pt has history of anxiety and stopped Lexapro end of may of last year. Pt get anxiety intermittently. Pt has history of tinnitus which was worse last summer.    Orders patient completed: hemoglobin A1C, glucose, lipid, BMP, CBC, TSH    Orders patient needs to complete: urine drug screen    Vaccinations Due: COVID, PCV    Actions needed for upcoming preventative visit:     Medications due for Refill:     Room Set up needed: drape, monofilament, gown  Last pap: 2/15/2024  Last mammogram: 2024  Last colon cancer screenin2023    Merissa RICHARDSON, EMT at 11:23 AM on 2025

## 2025-08-06 SDOH — HEALTH STABILITY: PHYSICAL HEALTH: ON AVERAGE, HOW MANY MINUTES DO YOU ENGAGE IN EXERCISE AT THIS LEVEL?: 20 MIN

## 2025-08-06 SDOH — HEALTH STABILITY: PHYSICAL HEALTH: ON AVERAGE, HOW MANY DAYS PER WEEK DO YOU ENGAGE IN MODERATE TO STRENUOUS EXERCISE (LIKE A BRISK WALK)?: 7 DAYS

## 2025-08-06 ASSESSMENT — SOCIAL DETERMINANTS OF HEALTH (SDOH): HOW OFTEN DO YOU GET TOGETHER WITH FRIENDS OR RELATIVES?: ONCE A WEEK

## 2025-08-07 ENCOUNTER — MYC MEDICAL ADVICE (OUTPATIENT)
Dept: INTERNAL MEDICINE | Facility: CLINIC | Age: 65
End: 2025-08-07
Payer: COMMERCIAL

## 2025-08-11 ENCOUNTER — OFFICE VISIT (OUTPATIENT)
Dept: INTERNAL MEDICINE | Facility: CLINIC | Age: 65
End: 2025-08-11
Payer: COMMERCIAL

## 2025-08-11 VITALS
RESPIRATION RATE: 16 BRPM | HEART RATE: 95 BPM | HEIGHT: 71 IN | DIASTOLIC BLOOD PRESSURE: 83 MMHG | OXYGEN SATURATION: 98 % | SYSTOLIC BLOOD PRESSURE: 124 MMHG | BODY MASS INDEX: 20.1 KG/M2 | WEIGHT: 143.6 LBS | TEMPERATURE: 96.8 F

## 2025-08-11 DIAGNOSIS — Z00.00 ROUTINE GENERAL MEDICAL EXAMINATION AT A HEALTH CARE FACILITY: Primary | ICD-10-CM

## 2025-08-11 DIAGNOSIS — F41.1 GAD (GENERALIZED ANXIETY DISORDER): ICD-10-CM

## 2025-08-11 DIAGNOSIS — G47.00 PERSISTENT INSOMNIA: ICD-10-CM

## 2025-08-11 DIAGNOSIS — M79.2 NEUROPATHIC PAIN: ICD-10-CM

## 2025-08-11 PROCEDURE — 3074F SYST BP LT 130 MM HG: CPT | Performed by: INTERNAL MEDICINE

## 2025-08-11 PROCEDURE — 99396 PREV VISIT EST AGE 40-64: CPT | Performed by: INTERNAL MEDICINE

## 2025-08-11 PROCEDURE — 3079F DIAST BP 80-89 MM HG: CPT | Performed by: INTERNAL MEDICINE

## 2025-08-11 PROCEDURE — 99214 OFFICE O/P EST MOD 30 MIN: CPT | Mod: 25 | Performed by: INTERNAL MEDICINE

## 2025-08-11 RX ORDER — HYDROXYZINE HYDROCHLORIDE 10 MG/1
25 TABLET, FILM COATED ORAL
COMMUNITY

## 2025-08-11 RX ORDER — ESCITALOPRAM OXALATE 10 MG/1
12.5 TABLET ORAL DAILY
COMMUNITY
Start: 2025-01-06

## 2025-08-11 RX ORDER — GABAPENTIN 300 MG/1
300 CAPSULE ORAL DAILY
Qty: 30 CAPSULE | Refills: 11 | Status: SHIPPED | OUTPATIENT
Start: 2025-08-11

## 2025-08-11 RX ORDER — CLONAZEPAM 1 MG/1
1 TABLET ORAL AT BEDTIME
COMMUNITY
Start: 2025-03-31

## 2025-08-11 RX ORDER — ESCITALOPRAM OXALATE 5 MG/1
TABLET ORAL
COMMUNITY
Start: 2025-07-21

## 2025-08-11 RX ORDER — CLOBETASOL PROPIONATE 0.5 MG/G
OINTMENT TOPICAL 2 TIMES DAILY
COMMUNITY
Start: 2024-10-25

## 2025-08-11 RX ORDER — MIRTAZAPINE 15 MG/1
15 TABLET, FILM COATED ORAL AT BEDTIME
COMMUNITY
Start: 2025-04-07

## 2025-08-22 ASSESSMENT — ANXIETY QUESTIONNAIRES
8. IF YOU CHECKED OFF ANY PROBLEMS, HOW DIFFICULT HAVE THESE MADE IT FOR YOU TO DO YOUR WORK, TAKE CARE OF THINGS AT HOME, OR GET ALONG WITH OTHER PEOPLE?: SOMEWHAT DIFFICULT
IF YOU CHECKED OFF ANY PROBLEMS ON THIS QUESTIONNAIRE, HOW DIFFICULT HAVE THESE PROBLEMS MADE IT FOR YOU TO DO YOUR WORK, TAKE CARE OF THINGS AT HOME, OR GET ALONG WITH OTHER PEOPLE: SOMEWHAT DIFFICULT
6. BECOMING EASILY ANNOYED OR IRRITABLE: SEVERAL DAYS
3. WORRYING TOO MUCH ABOUT DIFFERENT THINGS: SEVERAL DAYS
GAD7 TOTAL SCORE: 11
7. FEELING AFRAID AS IF SOMETHING AWFUL MIGHT HAPPEN: NOT AT ALL
5. BEING SO RESTLESS THAT IT IS HARD TO SIT STILL: NEARLY EVERY DAY
GAD7 TOTAL SCORE: 11
GAD7 TOTAL SCORE: 11
7. FEELING AFRAID AS IF SOMETHING AWFUL MIGHT HAPPEN: NOT AT ALL
2. NOT BEING ABLE TO STOP OR CONTROL WORRYING: SEVERAL DAYS
1. FEELING NERVOUS, ANXIOUS, OR ON EDGE: MORE THAN HALF THE DAYS
4. TROUBLE RELAXING: NEARLY EVERY DAY

## 2025-08-27 ENCOUNTER — OFFICE VISIT (OUTPATIENT)
Dept: PSYCHIATRY | Facility: CLINIC | Age: 65
End: 2025-08-27
Payer: COMMERCIAL

## 2025-08-27 DIAGNOSIS — H93.19 TINNITUS: ICD-10-CM

## 2025-08-27 DIAGNOSIS — R52 GENERALIZED PAIN: ICD-10-CM

## 2025-08-27 DIAGNOSIS — F41.1 GAD (GENERALIZED ANXIETY DISORDER): ICD-10-CM

## 2025-08-27 DIAGNOSIS — M79.10 MYALGIA: Primary | ICD-10-CM

## 2025-08-27 DIAGNOSIS — M79.2 NERVE PAIN: ICD-10-CM

## 2025-08-29 ENCOUNTER — MYC MEDICAL ADVICE (OUTPATIENT)
Dept: INTERNAL MEDICINE | Facility: CLINIC | Age: 65
End: 2025-08-29
Payer: COMMERCIAL

## 2025-09-02 ENCOUNTER — OFFICE VISIT (OUTPATIENT)
Dept: PSYCHIATRY | Facility: CLINIC | Age: 65
End: 2025-09-02
Payer: COMMERCIAL

## 2025-09-02 DIAGNOSIS — M79.2 NERVE PAIN: ICD-10-CM

## 2025-09-02 DIAGNOSIS — R52 GENERALIZED PAIN: ICD-10-CM

## 2025-09-02 DIAGNOSIS — M79.10 MYALGIA: Primary | ICD-10-CM

## 2025-09-02 DIAGNOSIS — H93.19 TINNITUS, UNSPECIFIED LATERALITY: ICD-10-CM

## 2025-09-02 DIAGNOSIS — F41.1 GAD (GENERALIZED ANXIETY DISORDER): ICD-10-CM

## 2025-10-02 ENCOUNTER — PRE VISIT (OUTPATIENT)
Dept: OTOLARYNGOLOGY | Facility: CLINIC | Age: 65
End: 2025-10-02

## (undated) DEVICE — WIPE PREMOIST CLEANSING WASHCLOTHS 7988

## (undated) DEVICE — ENDO CAP AND TUBING STERILE FOR ENDOGATOR  100130

## (undated) DEVICE — ENDO CONNECTOR ENDOGATOR AUX WATER JET FOR OLYMPUS SCOPE

## (undated) DEVICE — SUCTION MANIFOLD NEPTUNE 2 SYS 4 PORT 0702-020-000

## (undated) DEVICE — TAPE DURAPORE 3" SILK 1538-3

## (undated) DEVICE — SPECIMEN CONTAINER 3OZ W/FORMALIN 59901

## (undated) DEVICE — SOL WATER IRRIG 1000ML BOTTLE 2F7114

## (undated) DEVICE — ENDO FORCEP ENDOJAW BIOPSY 2.8MMX230CM FB-220U

## (undated) RX ORDER — DIPHENHYDRAMINE HYDROCHLORIDE 50 MG/ML
INJECTION INTRAMUSCULAR; INTRAVENOUS
Status: DISPENSED
Start: 2018-05-04

## (undated) RX ORDER — FENTANYL CITRATE 50 UG/ML
INJECTION, SOLUTION INTRAMUSCULAR; INTRAVENOUS
Status: DISPENSED
Start: 2018-05-04